# Patient Record
Sex: MALE | Race: WHITE | Employment: OTHER | URBAN - METROPOLITAN AREA
[De-identification: names, ages, dates, MRNs, and addresses within clinical notes are randomized per-mention and may not be internally consistent; named-entity substitution may affect disease eponyms.]

---

## 2017-01-14 DIAGNOSIS — S46.819A TRAPEZIUS STRAIN, UNSPECIFIED LATERALITY, INITIAL ENCOUNTER: ICD-10-CM

## 2017-01-14 DIAGNOSIS — K21.9 GASTROESOPHAGEAL REFLUX DISEASE WITHOUT ESOPHAGITIS: ICD-10-CM

## 2017-01-16 RX ORDER — PANTOPRAZOLE SODIUM 40 MG/1
TABLET, DELAYED RELEASE ORAL
Qty: 60 TABLET | Refills: 3 | Status: SHIPPED | OUTPATIENT
Start: 2017-01-16 | End: 2017-05-01 | Stop reason: SDUPTHER

## 2017-01-16 RX ORDER — MELOXICAM 7.5 MG/1
TABLET ORAL
Qty: 60 TABLET | Refills: 3 | Status: SHIPPED | OUTPATIENT
Start: 2017-01-16 | End: 2017-05-12 | Stop reason: SDUPTHER

## 2017-01-29 DIAGNOSIS — I10 BENIGN ESSENTIAL HTN: ICD-10-CM

## 2017-01-29 DIAGNOSIS — I73.9 PAD (PERIPHERAL ARTERY DISEASE) (HCC): Primary | ICD-10-CM

## 2017-01-30 RX ORDER — HYDROXYZINE PAMOATE 50 MG/1
CAPSULE ORAL
Qty: 60 CAPSULE | Refills: 5 | Status: SHIPPED | OUTPATIENT
Start: 2017-01-30 | End: 2017-03-28

## 2017-01-30 RX ORDER — CILOSTAZOL 100 MG/1
TABLET ORAL
Qty: 60 TABLET | Refills: 5 | Status: SHIPPED | OUTPATIENT
Start: 2017-01-30 | End: 2017-03-28

## 2017-02-28 RX ORDER — METOPROLOL TARTRATE 50 MG/1
TABLET, FILM COATED ORAL
Qty: 60 TABLET | Refills: 5 | Status: SHIPPED | OUTPATIENT
Start: 2017-02-28 | End: 2017-03-28

## 2017-03-22 DIAGNOSIS — E11.42 TYPE 2 DIABETES MELLITUS WITH DIABETIC POLYNEUROPATHY (HCC): ICD-10-CM

## 2017-03-28 DIAGNOSIS — E11.42 TYPE 2 DIABETES MELLITUS WITH DIABETIC POLYNEUROPATHY (HCC): ICD-10-CM

## 2017-03-28 RX ORDER — HYDROXYZINE PAMOATE 50 MG/1
CAPSULE ORAL
Qty: 60 CAPSULE | Refills: 5 | Status: SHIPPED | OUTPATIENT
Start: 2017-03-28 | End: 2018-02-21 | Stop reason: SDUPTHER

## 2017-03-28 RX ORDER — METOPROLOL TARTRATE 50 MG/1
TABLET, FILM COATED ORAL
Qty: 60 TABLET | Refills: 5 | Status: SHIPPED | OUTPATIENT
Start: 2017-03-28 | End: 2018-02-21 | Stop reason: SDUPTHER

## 2017-03-28 RX ORDER — CILOSTAZOL 100 MG/1
TABLET ORAL
Qty: 60 TABLET | Refills: 5 | Status: SHIPPED | OUTPATIENT
Start: 2017-03-28 | End: 2018-02-09 | Stop reason: SDUPTHER

## 2017-05-01 DIAGNOSIS — K21.9 GASTROESOPHAGEAL REFLUX DISEASE WITHOUT ESOPHAGITIS: ICD-10-CM

## 2017-05-01 RX ORDER — ESCITALOPRAM OXALATE 10 MG/1
TABLET ORAL
Qty: 45 TABLET | Refills: 5 | Status: SHIPPED | OUTPATIENT
Start: 2017-05-01 | End: 2017-10-26 | Stop reason: SDUPTHER

## 2017-05-01 RX ORDER — PANTOPRAZOLE SODIUM 40 MG/1
TABLET, DELAYED RELEASE ORAL
Qty: 60 TABLET | Refills: 5 | Status: SHIPPED | OUTPATIENT
Start: 2017-05-01 | End: 2018-02-21 | Stop reason: SDUPTHER

## 2017-05-12 DIAGNOSIS — E78.2 MIXED HYPERLIPIDEMIA: ICD-10-CM

## 2017-05-12 DIAGNOSIS — S46.819A TRAPEZIUS STRAIN, UNSPECIFIED LATERALITY, INITIAL ENCOUNTER: ICD-10-CM

## 2017-05-12 RX ORDER — MELOXICAM 7.5 MG/1
TABLET ORAL
Qty: 60 TABLET | Refills: 1 | Status: SHIPPED | OUTPATIENT
Start: 2017-05-12 | End: 2017-07-06 | Stop reason: SDUPTHER

## 2017-05-12 RX ORDER — ROSUVASTATIN CALCIUM 20 MG/1
TABLET, COATED ORAL
Qty: 30 TABLET | Refills: 5 | Status: SHIPPED | OUTPATIENT
Start: 2017-05-12 | End: 2017-11-24 | Stop reason: SDUPTHER

## 2017-05-12 RX ORDER — HYDROCHLOROTHIAZIDE 25 MG/1
TABLET ORAL
Qty: 30 TABLET | Refills: 5 | Status: SHIPPED | OUTPATIENT
Start: 2017-05-12 | End: 2017-11-24 | Stop reason: SDUPTHER

## 2017-05-12 RX ORDER — SAXAGLIPTIN 5 MG/1
TABLET, FILM COATED ORAL
Qty: 90 TABLET | Refills: 1 | Status: SHIPPED | OUTPATIENT
Start: 2017-05-12 | End: 2017-11-24 | Stop reason: SDUPTHER

## 2017-06-14 LAB
CHOLESTEROL, TOTAL: 146 MG/DL
CHOLESTEROL/HDL RATIO: ABNORMAL
HBA1C MFR BLD: 10.8 %
HDLC SERPL-MCNC: 34 MG/DL (ref 35–70)
LDL CHOLESTEROL CALCULATED: 87 MG/DL (ref 0–160)
TRIGL SERPL-MCNC: 125 MG/DL
VLDLC SERPL CALC-MCNC: 25 MG/DL

## 2017-06-15 ENCOUNTER — OFFICE VISIT (OUTPATIENT)
Dept: FAMILY MEDICINE CLINIC | Age: 47
End: 2017-06-15
Payer: COMMERCIAL

## 2017-06-15 VITALS
HEIGHT: 69 IN | WEIGHT: 191 LBS | BODY MASS INDEX: 28.29 KG/M2 | DIASTOLIC BLOOD PRESSURE: 88 MMHG | SYSTOLIC BLOOD PRESSURE: 138 MMHG | HEART RATE: 85 BPM

## 2017-06-15 DIAGNOSIS — E11.00 UNCONTROLLED TYPE 2 DIABETES MELLITUS WITH HYPEROSMOLARITY WITHOUT COMA, WITH LONG-TERM CURRENT USE OF INSULIN (HCC): Primary | ICD-10-CM

## 2017-06-15 DIAGNOSIS — Z79.4 UNCONTROLLED TYPE 2 DIABETES MELLITUS WITH HYPEROSMOLARITY WITHOUT COMA, WITH LONG-TERM CURRENT USE OF INSULIN (HCC): Primary | ICD-10-CM

## 2017-06-15 DIAGNOSIS — R80.9 MICROALBUMINURIA: ICD-10-CM

## 2017-06-15 DIAGNOSIS — E78.2 MIXED HYPERLIPIDEMIA: ICD-10-CM

## 2017-06-15 DIAGNOSIS — M79.605 LEG PAIN, LEFT: ICD-10-CM

## 2017-06-15 DIAGNOSIS — I73.9 PVD (PERIPHERAL VASCULAR DISEASE) (HCC): ICD-10-CM

## 2017-06-15 DIAGNOSIS — K21.9 GASTROESOPHAGEAL REFLUX DISEASE WITHOUT ESOPHAGITIS: ICD-10-CM

## 2017-06-15 DIAGNOSIS — I10 BENIGN ESSENTIAL HTN: ICD-10-CM

## 2017-06-15 PROCEDURE — 99214 OFFICE O/P EST MOD 30 MIN: CPT | Performed by: INTERNAL MEDICINE

## 2017-06-15 RX ORDER — GABAPENTIN 300 MG/1
300 CAPSULE ORAL 3 TIMES DAILY
Qty: 90 CAPSULE | Refills: 5 | Status: SHIPPED | OUTPATIENT
Start: 2017-06-15 | End: 2018-02-16 | Stop reason: SDUPTHER

## 2017-06-15 ASSESSMENT — ENCOUNTER SYMPTOMS
SORE THROAT: 0
ABDOMINAL PAIN: 0
SHORTNESS OF BREATH: 0
DIARRHEA: 0
RESPIRATORY NEGATIVE: 1
BLOOD IN STOOL: 0
NAUSEA: 0
CONSTIPATION: 0

## 2017-06-19 DIAGNOSIS — Z79.4 UNCONTROLLED TYPE 2 DIABETES MELLITUS WITH HYPEROSMOLARITY WITHOUT COMA, WITH LONG-TERM CURRENT USE OF INSULIN (HCC): ICD-10-CM

## 2017-06-19 DIAGNOSIS — M79.605 LEG PAIN, LEFT: ICD-10-CM

## 2017-06-19 DIAGNOSIS — E11.00 UNCONTROLLED TYPE 2 DIABETES MELLITUS WITH HYPEROSMOLARITY WITHOUT COMA, WITH LONG-TERM CURRENT USE OF INSULIN (HCC): ICD-10-CM

## 2017-06-23 ENCOUNTER — TELEPHONE (OUTPATIENT)
Dept: FAMILY MEDICINE CLINIC | Age: 47
End: 2017-06-23

## 2017-06-23 DIAGNOSIS — Z79.4 UNCONTROLLED TYPE 2 DIABETES MELLITUS WITH HYPEROSMOLARITY WITHOUT COMA, WITH LONG-TERM CURRENT USE OF INSULIN (HCC): ICD-10-CM

## 2017-06-23 DIAGNOSIS — E11.00 UNCONTROLLED TYPE 2 DIABETES MELLITUS WITH HYPEROSMOLARITY WITHOUT COMA, WITH LONG-TERM CURRENT USE OF INSULIN (HCC): ICD-10-CM

## 2017-06-26 ENCOUNTER — TELEPHONE (OUTPATIENT)
Dept: FAMILY MEDICINE CLINIC | Age: 47
End: 2017-06-26

## 2017-06-26 DIAGNOSIS — E11.00 UNCONTROLLED TYPE 2 DIABETES MELLITUS WITH HYPEROSMOLARITY WITHOUT COMA, WITH LONG-TERM CURRENT USE OF INSULIN (HCC): ICD-10-CM

## 2017-06-26 DIAGNOSIS — Z79.4 UNCONTROLLED TYPE 2 DIABETES MELLITUS WITH HYPEROSMOLARITY WITHOUT COMA, WITH LONG-TERM CURRENT USE OF INSULIN (HCC): ICD-10-CM

## 2017-06-30 ENCOUNTER — TELEPHONE (OUTPATIENT)
Dept: FAMILY MEDICINE CLINIC | Age: 47
End: 2017-06-30

## 2017-07-06 DIAGNOSIS — E11.00 UNCONTROLLED TYPE 2 DIABETES MELLITUS WITH HYPEROSMOLARITY WITHOUT COMA, WITH LONG-TERM CURRENT USE OF INSULIN (HCC): ICD-10-CM

## 2017-07-06 DIAGNOSIS — S46.819A TRAPEZIUS STRAIN, UNSPECIFIED LATERALITY, INITIAL ENCOUNTER: ICD-10-CM

## 2017-07-06 DIAGNOSIS — Z79.4 UNCONTROLLED TYPE 2 DIABETES MELLITUS WITH HYPEROSMOLARITY WITHOUT COMA, WITH LONG-TERM CURRENT USE OF INSULIN (HCC): ICD-10-CM

## 2017-07-06 RX ORDER — MELOXICAM 7.5 MG/1
TABLET ORAL
Qty: 60 TABLET | Refills: 3 | Status: SHIPPED | OUTPATIENT
Start: 2017-07-06 | End: 2018-02-21 | Stop reason: SDUPTHER

## 2017-07-14 ENCOUNTER — OFFICE VISIT (OUTPATIENT)
Dept: FAMILY MEDICINE CLINIC | Age: 47
End: 2017-07-14
Payer: COMMERCIAL

## 2017-07-14 VITALS
DIASTOLIC BLOOD PRESSURE: 86 MMHG | BODY MASS INDEX: 29.03 KG/M2 | WEIGHT: 196 LBS | SYSTOLIC BLOOD PRESSURE: 134 MMHG | HEART RATE: 80 BPM | HEIGHT: 69 IN

## 2017-07-14 DIAGNOSIS — Z79.4 UNCONTROLLED TYPE 2 DIABETES MELLITUS WITH HYPEROSMOLARITY WITHOUT COMA, WITH LONG-TERM CURRENT USE OF INSULIN (HCC): ICD-10-CM

## 2017-07-14 DIAGNOSIS — E11.00 UNCONTROLLED TYPE 2 DIABETES MELLITUS WITH HYPEROSMOLARITY WITHOUT COMA, WITH LONG-TERM CURRENT USE OF INSULIN (HCC): ICD-10-CM

## 2017-07-14 PROCEDURE — 99213 OFFICE O/P EST LOW 20 MIN: CPT | Performed by: INTERNAL MEDICINE

## 2017-07-14 ASSESSMENT — ENCOUNTER SYMPTOMS
RESPIRATORY NEGATIVE: 1
BLOOD IN STOOL: 0
DIARRHEA: 0
SORE THROAT: 0
NAUSEA: 0
ABDOMINAL PAIN: 0
CONSTIPATION: 0

## 2017-07-31 ENCOUNTER — TELEPHONE (OUTPATIENT)
Dept: FAMILY MEDICINE CLINIC | Age: 47
End: 2017-07-31

## 2017-07-31 DIAGNOSIS — E11.00 UNCONTROLLED TYPE 2 DIABETES MELLITUS WITH HYPEROSMOLARITY WITHOUT COMA, WITH LONG-TERM CURRENT USE OF INSULIN (HCC): ICD-10-CM

## 2017-07-31 DIAGNOSIS — Z79.4 UNCONTROLLED TYPE 2 DIABETES MELLITUS WITH HYPEROSMOLARITY WITHOUT COMA, WITH LONG-TERM CURRENT USE OF INSULIN (HCC): ICD-10-CM

## 2017-08-04 DIAGNOSIS — Z79.4 UNCONTROLLED TYPE 2 DIABETES MELLITUS WITH HYPEROSMOLARITY WITHOUT COMA, WITH LONG-TERM CURRENT USE OF INSULIN (HCC): ICD-10-CM

## 2017-08-04 DIAGNOSIS — E11.00 UNCONTROLLED TYPE 2 DIABETES MELLITUS WITH HYPEROSMOLARITY WITHOUT COMA, WITH LONG-TERM CURRENT USE OF INSULIN (HCC): ICD-10-CM

## 2017-08-04 RX ORDER — INSULIN DETEMIR 100 [IU]/ML
INJECTION, SOLUTION SUBCUTANEOUS
Qty: 15 ML | Refills: 3 | Status: SHIPPED | OUTPATIENT
Start: 2017-08-04 | End: 2017-08-23 | Stop reason: SDUPTHER

## 2017-08-17 ENCOUNTER — TELEPHONE (OUTPATIENT)
Dept: FAMILY MEDICINE CLINIC | Age: 47
End: 2017-08-17

## 2017-08-23 DIAGNOSIS — Z79.4 UNCONTROLLED TYPE 2 DIABETES MELLITUS WITH HYPEROSMOLARITY WITHOUT COMA, WITH LONG-TERM CURRENT USE OF INSULIN (HCC): ICD-10-CM

## 2017-08-23 DIAGNOSIS — E11.00 UNCONTROLLED TYPE 2 DIABETES MELLITUS WITH HYPEROSMOLARITY WITHOUT COMA, WITH LONG-TERM CURRENT USE OF INSULIN (HCC): ICD-10-CM

## 2017-08-23 RX ORDER — CALCIUM CITRATE/VITAMIN D3 200MG-6.25
TABLET ORAL
Refills: 0 | COMMUNITY
Start: 2017-07-31 | End: 2017-10-03 | Stop reason: SDUPTHER

## 2017-08-23 RX ORDER — LANCETS 28 GAUGE
EACH MISCELLANEOUS
Refills: 0 | COMMUNITY
Start: 2017-07-31 | End: 2019-10-10 | Stop reason: SDUPTHER

## 2017-08-28 ENCOUNTER — TELEPHONE (OUTPATIENT)
Dept: FAMILY MEDICINE CLINIC | Age: 47
End: 2017-08-28

## 2017-08-28 DIAGNOSIS — Z79.4 UNCONTROLLED TYPE 2 DIABETES MELLITUS WITH HYPEROSMOLARITY WITHOUT COMA, WITH LONG-TERM CURRENT USE OF INSULIN (HCC): ICD-10-CM

## 2017-08-28 DIAGNOSIS — E11.00 UNCONTROLLED TYPE 2 DIABETES MELLITUS WITH HYPEROSMOLARITY WITHOUT COMA, WITH LONG-TERM CURRENT USE OF INSULIN (HCC): ICD-10-CM

## 2017-09-06 ENCOUNTER — TELEPHONE (OUTPATIENT)
Dept: FAMILY MEDICINE CLINIC | Age: 47
End: 2017-09-06

## 2017-09-06 DIAGNOSIS — Z79.4 UNCONTROLLED TYPE 2 DIABETES MELLITUS WITH HYPEROSMOLARITY WITHOUT COMA, WITH LONG-TERM CURRENT USE OF INSULIN (HCC): ICD-10-CM

## 2017-09-06 DIAGNOSIS — E11.00 UNCONTROLLED TYPE 2 DIABETES MELLITUS WITH HYPEROSMOLARITY WITHOUT COMA, WITH LONG-TERM CURRENT USE OF INSULIN (HCC): ICD-10-CM

## 2017-09-13 DIAGNOSIS — Z79.4 UNCONTROLLED TYPE 2 DIABETES MELLITUS WITH HYPEROSMOLARITY WITHOUT COMA, WITH LONG-TERM CURRENT USE OF INSULIN (HCC): ICD-10-CM

## 2017-09-13 DIAGNOSIS — E11.00 UNCONTROLLED TYPE 2 DIABETES MELLITUS WITH HYPEROSMOLARITY WITHOUT COMA, WITH LONG-TERM CURRENT USE OF INSULIN (HCC): ICD-10-CM

## 2017-10-03 DIAGNOSIS — Z79.4 UNCONTROLLED TYPE 2 DIABETES MELLITUS WITH HYPEROSMOLARITY WITHOUT COMA, WITH LONG-TERM CURRENT USE OF INSULIN (HCC): ICD-10-CM

## 2017-10-03 DIAGNOSIS — E11.00 UNCONTROLLED TYPE 2 DIABETES MELLITUS WITH HYPEROSMOLARITY WITHOUT COMA, WITH LONG-TERM CURRENT USE OF INSULIN (HCC): ICD-10-CM

## 2017-10-03 RX ORDER — CALCIUM CITRATE/VITAMIN D3 200MG-6.25
TABLET ORAL
Qty: 50 EACH | Refills: 11 | Status: SHIPPED | OUTPATIENT
Start: 2017-10-03 | End: 2018-06-19 | Stop reason: SDUPTHER

## 2017-10-20 ENCOUNTER — OFFICE VISIT (OUTPATIENT)
Dept: FAMILY MEDICINE CLINIC | Age: 47
End: 2017-10-20
Payer: COMMERCIAL

## 2017-10-20 VITALS
DIASTOLIC BLOOD PRESSURE: 90 MMHG | WEIGHT: 200 LBS | HEART RATE: 82 BPM | SYSTOLIC BLOOD PRESSURE: 150 MMHG | HEIGHT: 69 IN | BODY MASS INDEX: 29.62 KG/M2

## 2017-10-20 DIAGNOSIS — Z79.4 UNCONTROLLED TYPE 2 DIABETES MELLITUS WITH HYPEROSMOLARITY WITHOUT COMA, WITH LONG-TERM CURRENT USE OF INSULIN (HCC): ICD-10-CM

## 2017-10-20 DIAGNOSIS — K21.9 GASTROESOPHAGEAL REFLUX DISEASE WITHOUT ESOPHAGITIS: ICD-10-CM

## 2017-10-20 DIAGNOSIS — E78.2 MIXED HYPERLIPIDEMIA: ICD-10-CM

## 2017-10-20 DIAGNOSIS — R80.9 MICROALBUMINURIA: ICD-10-CM

## 2017-10-20 DIAGNOSIS — E55.9 VITAMIN D DEFICIENCY: ICD-10-CM

## 2017-10-20 DIAGNOSIS — E11.00 UNCONTROLLED TYPE 2 DIABETES MELLITUS WITH HYPEROSMOLARITY WITHOUT COMA, WITH LONG-TERM CURRENT USE OF INSULIN (HCC): ICD-10-CM

## 2017-10-20 DIAGNOSIS — I10 BENIGN ESSENTIAL HTN: Primary | ICD-10-CM

## 2017-10-20 DIAGNOSIS — Z23 NEED FOR INFLUENZA VACCINATION: ICD-10-CM

## 2017-10-20 DIAGNOSIS — I73.9 PVD (PERIPHERAL VASCULAR DISEASE) (HCC): ICD-10-CM

## 2017-10-20 DIAGNOSIS — E11.42 DIABETIC PERIPHERAL NEUROPATHY (HCC): ICD-10-CM

## 2017-10-20 LAB — HBA1C MFR BLD: 8.4 %

## 2017-10-20 PROCEDURE — G8417 CALC BMI ABV UP PARAM F/U: HCPCS | Performed by: INTERNAL MEDICINE

## 2017-10-20 PROCEDURE — G8427 DOCREV CUR MEDS BY ELIG CLIN: HCPCS | Performed by: INTERNAL MEDICINE

## 2017-10-20 PROCEDURE — 90471 IMMUNIZATION ADMIN: CPT | Performed by: INTERNAL MEDICINE

## 2017-10-20 PROCEDURE — 1036F TOBACCO NON-USER: CPT | Performed by: INTERNAL MEDICINE

## 2017-10-20 PROCEDURE — 90686 IIV4 VACC NO PRSV 0.5 ML IM: CPT | Performed by: INTERNAL MEDICINE

## 2017-10-20 PROCEDURE — 99214 OFFICE O/P EST MOD 30 MIN: CPT | Performed by: INTERNAL MEDICINE

## 2017-10-20 PROCEDURE — G8484 FLU IMMUNIZE NO ADMIN: HCPCS | Performed by: INTERNAL MEDICINE

## 2017-10-20 PROCEDURE — 3046F HEMOGLOBIN A1C LEVEL >9.0%: CPT | Performed by: INTERNAL MEDICINE

## 2017-10-20 PROCEDURE — 83036 HEMOGLOBIN GLYCOSYLATED A1C: CPT | Performed by: INTERNAL MEDICINE

## 2017-10-20 RX ORDER — AMLODIPINE BESYLATE 5 MG/1
5 TABLET ORAL DAILY
Qty: 30 TABLET | Refills: 5 | Status: SHIPPED | OUTPATIENT
Start: 2017-10-20 | End: 2019-09-25 | Stop reason: SDUPTHER

## 2017-10-20 ASSESSMENT — ENCOUNTER SYMPTOMS
ABDOMINAL PAIN: 0
RESPIRATORY NEGATIVE: 1
CONSTIPATION: 0
BLOOD IN STOOL: 0
NAUSEA: 0
DIARRHEA: 0
SORE THROAT: 0

## 2017-10-20 NOTE — PATIENT INSTRUCTIONS
1. Need for influenza vaccination  Preston was given an influenza vaccine today. - INFLUENZA, QUADV, 3 YRS AND OLDER, IM, PF, PREFILL SYR OR SDV, 0.5ML (FLUZONE QUADV, PF)    2. Benign essential HTN  Add Amlodipine 5 mg daily. Since Chrystal Donovan had an elevated blood pressure in the clinic today, he is instructed to follow up in the clinic in 2 weeks for a repeat blood pressure check. Preston was instructed to take his blood pressure medication at least 2 hours before the appointment. Instructions were also given to avoid caffeine before the blood pressure evaluation and to follow a sodium restricted diet. Attempt to obtain blood pressure measurements outside of the clinic and bring these readings to your office appointment. 3. Diabetic peripheral neuropathy (HCC)  Continue on Neurontin. 4. Gastroesophageal reflux disease without esophagitis  Continue Protonix. 5. Mixed hyperlipidemia  Preston was instructed to continue his current medication regimen. 6. Vitamin D deficiency  Continue on Vitamin D replacement. 7. Microalbuminuria  Preston is on an ARB. 8. PVD (peripheral vascular disease) ([facility] [address]  Follow up with Vascular surgery. - External Referral To Vascular Surgery    9. Uncontrolled type 2 diabetes mellitus with hyperosmolarity without coma, with long-term current use of insulin ([facility])  HgbA1C today. - POCT glycosylated hemoglobin (Hb A1C)    Laith Zuniga was instructed to follow up in the clinic in 3 months for check up or as needed with any medical issues. Patient Education        DASH Diet: Care Instructions  Your Care Instructions  The DASH diet is an eating plan that can help lower your blood pressure. DASH stands for Dietary Approaches to Stop Hypertension. Hypertension is high blood pressure. The DASH diet focuses on eating foods that are high in calcium, potassium, and magnesium. These nutrients can lower blood pressure.  The foods that are highest in these nutrients are fruits, vegetables, low-fat dairy products, nuts, seeds, and legumes. But taking calcium, potassium, and magnesium supplements instead of eating foods that are high in those nutrients does not have the same effect. The DASH diet also includes whole grains, fish, and poultry. The DASH diet is one of several lifestyle changes your doctor may recommend to lower your high blood pressure. Your doctor may also want you to decrease the amount of sodium in your diet. Lowering sodium while following the DASH diet can lower blood pressure even further than just the DASH diet alone. Follow-up care is a key part of your treatment and safety. Be sure to make and go to all appointments, and call your doctor if you are having problems. It's also a good idea to know your test results and keep a list of the medicines you take. How can you care for yourself at home? Following the DASH diet  · Eat 4 to 5 servings of fruit each day. A serving is 1 medium-sized piece of fruit, ½ cup chopped or canned fruit, 1/4 cup dried fruit, or 4 ounces (½ cup) of fruit juice. Choose fruit more often than fruit juice. · Eat 4 to 5 servings of vegetables each day. A serving is 1 cup of lettuce or raw leafy vegetables, ½ cup of chopped or cooked vegetables, or 4 ounces (½ cup) of vegetable juice. Choose vegetables more often than vegetable juice. · Get 2 to 3 servings of low-fat and fat-free dairy each day. A serving is 8 ounces of milk, 1 cup of yogurt, or 1 ½ ounces of cheese. · Eat 6 to 8 servings of grains each day. A serving is 1 slice of bread, 1 ounce of dry cereal, or ½ cup of cooked rice, pasta, or cooked cereal. Try to choose whole-grain products as much as possible. · Limit lean meat, poultry, and fish to 2 servings each day. A serving is 3 ounces, about the size of a deck of cards. · Eat 4 to 5 servings of nuts, seeds, and legumes (cooked dried beans, lentils, and split peas) each week.  A serving is 1/3 cup of nuts, 2 tablespoons of seeds, 2017  Content Version: 11.3  © 5467-4844 [employer]. Care instructions adapted under license by [facility] ([facility]). If you have questions about a medical condition or this instruction, always ask your healthcare professional. [address] any warranty or liability for your use of this information.

## 2017-10-20 NOTE — LETTER
[address] [address]  Phone: [phone]  Fax: [phone]    Glen Azevedo MD        October 20, 2017     Patient: Zainab Rivas   YOB: 1970   Date of Visit: 10/20/2017       To Whom It May Concern: It is my medical opinion that Sal Davila requires a disability parking placard for the following reasons:  He has limited walking ability due to vascular condition. Duration of need: permanent    If you have any questions or concerns, please don't hesitate to call.     Sincerely,        Glen Azevedo MD

## 2017-10-20 NOTE — PROGRESS NOTES
After obtaining consent, and per orders of Dr. Ivana Strickland, injection of influenza given in Left deltoid by Simona Higuera. Patient instructed to remain in clinic for 20 minutes afterwards, and to report any adverse reaction to me immediately.

## 2017-10-20 NOTE — PROGRESS NOTES
The problem is controlled. Recent lipid tests were reviewed and are normal. Pertinent negatives include no chest pain or myalgias. Current antihyperlipidemic treatment includes statins. The current treatment provides significant improvement of lipids. There are no compliance problems. Mental Health Problem     Additional symptoms of the illness do not include abdominal pain. Review of Systems   Constitutional: Negative. HENT: Negative for congestion, ear pain, postnasal drip, sneezing and sore throat. Eyes: Negative for visual disturbance. Respiratory: Negative. Cardiovascular: Negative for chest pain, palpitations and leg swelling. Gastrointestinal: Negative for abdominal pain, blood in stool, constipation, diarrhea and nausea. Genitourinary: Negative for difficulty urinating, dysuria, frequency and urgency. Musculoskeletal: Positive for arthralgias. Negative for joint swelling, myalgias, neck pain and neck stiffness. Skin: Negative. Neurological: Negative for syncope. Psychiatric/Behavioral: Negative. Objective:   Physical Exam   Constitutional: He is oriented to person, place, and time. He appears well-developed and well-nourished. No distress. HENT:   Head: Normocephalic. Eyes: Conjunctivae are normal.   Neck: Normal range of motion. Neck supple. Cardiovascular: Normal rate, regular rhythm and normal heart sounds. Pulmonary/Chest: Effort normal and breath sounds normal.   Abdominal: Soft. There is no tenderness. Musculoskeletal: He exhibits no edema. Lymphadenopathy:     He has no cervical adenopathy. Neurological: He is alert and oriented to person, place, and time. Skin: No rash noted. Psychiatric: He has a normal mood and affect. His behavior is normal. Thought content normal.       Assessment:      1. Benign essential HTN  amLODIPine (NORVASC) 5 MG tablet   2.  Need for influenza vaccination  INFLUENZA, QUADV, 3 YRS AND OLDER, IM, PF, PREFILL SYR OR SDV, 0.5ML (FLUZONE QUADV, PF)   3. Diabetic peripheral neuropathy ([facility] [address]     4. Gastroesophageal reflux disease without esophagitis     5. Mixed hyperlipidemia     6. Vitamin D deficiency     7. Microalbuminuria     8. PVD (peripheral vascular disease) [facility])  External Referral To Vascular Surgery   9. Uncontrolled type 2 diabetes mellitus with hyperosmolarity without coma, with long-term current use of insulin (HCC)  POCT glycosylated hemoglobin (Hb A1C)           Plan:      1. Need for influenza vaccination  Preston was given an influenza vaccine today. - INFLUENZA, QUADV, 3 YRS AND OLDER, IM, PF, PREFILL SYR OR SDV, 0.5ML (FLUZONE QUADV, PF)    2. Benign essential HTN  Add Amlodipine 5 mg daily. Since Lazaro Mann had an elevated blood pressure in the clinic today, he is instructed to follow up in the clinic in 2 weeks for a repeat blood pressure check. Prseton was instructed to take his blood pressure medication at least 2 hours before the appointment. Instructions were also given to avoid caffeine before the blood pressure evaluation and to follow a sodium restricted diet. Attempt to obtain blood pressure measurements outside of the clinic and bring these readings to your office appointment. 3. Diabetic peripheral neuropathy (HCC)  Continue on Neurontin. 4. Gastroesophageal reflux disease without esophagitis  Continue Protonix. 5. Mixed hyperlipidemia  Preston was instructed to continue his current medication regimen. 6. Vitamin D deficiency  Continue on Vitamin D replacement. 7. Microalbuminuria  Preston is on an ARB. 8. PVD (peripheral vascular disease) ([facility] [address]  Follow up with Vascular surgery. - External Referral To Vascular Surgery    9. Uncontrolled type 2 diabetes mellitus with hyperosmolarity without coma, with long-term current use of insulin (HCC)  HgbA1C today.     - POCT glycosylated hemoglobin (Hb A1C)    Sumi Jiménez was instructed to follow up in the clinic in 3 months for check up

## 2017-10-27 ENCOUNTER — TELEPHONE (OUTPATIENT)
Dept: FAMILY MEDICINE CLINIC | Age: 47
End: 2017-10-27

## 2017-10-27 DIAGNOSIS — E11.00 UNCONTROLLED TYPE 2 DIABETES MELLITUS WITH HYPEROSMOLARITY WITHOUT COMA, WITH LONG-TERM CURRENT USE OF INSULIN (HCC): ICD-10-CM

## 2017-10-27 DIAGNOSIS — Z79.4 UNCONTROLLED TYPE 2 DIABETES MELLITUS WITH HYPEROSMOLARITY WITHOUT COMA, WITH LONG-TERM CURRENT USE OF INSULIN (HCC): ICD-10-CM

## 2017-10-27 RX ORDER — ESCITALOPRAM OXALATE 10 MG/1
TABLET ORAL
Qty: 45 TABLET | Refills: 5 | Status: SHIPPED | OUTPATIENT
Start: 2017-10-27 | End: 2018-02-21 | Stop reason: SDUPTHER

## 2017-10-27 NOTE — TELEPHONE ENCOUNTER
Health Maintenance   Topic Date Due    HIV screen  04/15/1986    Diabetic foot exam  10/08/2016    Diabetic retinal exam  10/08/2016    Diabetic microalbuminuria test  03/30/2017    Lipid screen  06/14/2018    Diabetic hemoglobin A1C test  10/20/2018    DTaP/Tdap/Td vaccine (2 - Td) 11/19/2022    Flu vaccine  Completed    Pneumococcal med risk  Completed             (applicable per patient's age: Cancer Screenings, Depression Screening, Fall Risk Screening, Immunizations)    Hemoglobin A1C (%)   Date Value   10/20/2017 8.4   06/14/2017 10.8   10/19/2016 8.7     Microalb/Crt.  Ratio (mcg/mg creat)   Date Value   10/09/2015 24     LDL Cholesterol (mg/dL)   Date Value   10/09/2015 119     LDL Calculated (mg/dL)   Date Value   06/14/2017 87     AST (U/L)   Date Value   10/09/2015 32     ALT (U/L)   Date Value   10/09/2015 66 (H)     BUN (mg/dL)   Date Value   10/09/2015 14      (goal A1C is < 7)   (goal LDL is <100) need 30-50% reduction from baseline     BP Readings from Last 3 Encounters:   10/20/17 (!) 150/90   07/14/17 134/86   06/15/17 138/88    (goal /80)      All Future Testing planned in [facility]:  Lab Frequency Next Occurrence   Basic Metabolic Panel Once 70/32/6993   Hemoglobin A1C Once 11/14/2017       Next Visit Date:  Future Appointments  Date Time Provider Dwaine Pal   11/3/2017 10:00 AM SCHEDULE, IMANI [facility]   1/22/2018 9:45 AM MD Jad Hamlin [facility]            Patient Active Problem List:     GERD (gastroesophageal reflux disease)     Carpal tunnel syndrome of right wrist     Benign essential HTN     Diabetic peripheral neuropathy (HCC)     Mixed hyperlipidemia     Vitamin D deficiency     Leg pain, left     Microalbuminuria     PVD (peripheral vascular disease) ([facility] [address]     Need for influenza vaccination

## 2017-10-27 NOTE — TELEPHONE ENCOUNTER
Patient informed to increase levemir to 87 units daily. Advised to call next week with FBS readings.

## 2017-10-27 NOTE — TELEPHONE ENCOUNTER
Patient calling with FBS's past week 178, 167, 154, 154, 151. Taking 82 units Levemir Q morning, added on amlodipine past week. Patient states \"feeling better\". Advised to continue medication doses as is and call office next week with update. Health Maintenance   Topic Date Due    HIV screen  04/15/1986    Diabetic foot exam  10/08/2016    Diabetic retinal exam  10/08/2016    Diabetic microalbuminuria test  03/30/2017    Lipid screen  06/14/2018    Diabetic hemoglobin A1C test  10/20/2018    DTaP/Tdap/Td vaccine (2 - Td) 11/19/2022    Flu vaccine  Completed    Pneumococcal med risk  Completed             (applicable per patient's age: Cancer Screenings, Depression Screening, Fall Risk Screening, Immunizations)    Hemoglobin A1C (%)   Date Value   10/20/2017 8.4   06/14/2017 10.8   10/19/2016 8.7     Microalb/Crt.  Ratio (mcg/mg creat)   Date Value   10/09/2015 24     LDL Cholesterol (mg/dL)   Date Value   10/09/2015 119     LDL Calculated (mg/dL)   Date Value   06/14/2017 87     AST (U/L)   Date Value   10/09/2015 32     ALT (U/L)   Date Value   10/09/2015 66 (H)     BUN (mg/dL)   Date Value   10/09/2015 14      (goal A1C is < 7)   (goal LDL is <100) need 30-50% reduction from baseline     BP Readings from Last 3 Encounters:   10/20/17 (!) 150/90   07/14/17 134/86   06/15/17 138/88    (goal /80)      All Future Testing planned in [facility]:  Lab Frequency Next Occurrence   Basic Metabolic Panel Once 51/91/2828   Hemoglobin A1C Once 11/14/2017       Next Visit Date:  Future Appointments  Date Time Provider Dwaine Pal   11/3/2017 10:00 AM SCHEDULE, IMANI [facility] [place]   1/22/2018 9:45 [facility]            Patient Active Problem List:     GERD (gastroesophageal reflux disease)     Carpal tunnel syndrome of right wrist     Benign essential HTN     Diabetic peripheral neuropathy (HCC)     Mixed hyperlipidemia     Vitamin D deficiency     Leg

## 2017-11-03 ENCOUNTER — NURSE ONLY (OUTPATIENT)
Dept: FAMILY MEDICINE CLINIC | Age: 47
End: 2017-11-03

## 2017-11-03 VITALS — DIASTOLIC BLOOD PRESSURE: 86 MMHG | SYSTOLIC BLOOD PRESSURE: 145 MMHG

## 2017-11-03 DIAGNOSIS — I10 BENIGN ESSENTIAL HTN: Primary | ICD-10-CM

## 2017-11-03 NOTE — PROGRESS NOTES
Pt presents today for a blood pressure check. Denies HA, SOB, CP, or dizziness. Just started the Norvasc on Monday.

## 2017-11-14 ENCOUNTER — OFFICE VISIT (OUTPATIENT)
Dept: FAMILY MEDICINE CLINIC | Age: 47
End: 2017-11-14
Payer: COMMERCIAL

## 2017-11-14 VITALS
HEIGHT: 69 IN | WEIGHT: 199 LBS | BODY MASS INDEX: 29.47 KG/M2 | DIASTOLIC BLOOD PRESSURE: 84 MMHG | HEART RATE: 96 BPM | SYSTOLIC BLOOD PRESSURE: 148 MMHG

## 2017-11-14 DIAGNOSIS — H69.83 EUSTACHIAN TUBE DYSFUNCTION, BILATERAL: Primary | ICD-10-CM

## 2017-11-14 PROCEDURE — G8484 FLU IMMUNIZE NO ADMIN: HCPCS | Performed by: INTERNAL MEDICINE

## 2017-11-14 PROCEDURE — G8417 CALC BMI ABV UP PARAM F/U: HCPCS | Performed by: INTERNAL MEDICINE

## 2017-11-14 PROCEDURE — 99213 OFFICE O/P EST LOW 20 MIN: CPT | Performed by: INTERNAL MEDICINE

## 2017-11-14 PROCEDURE — 1036F TOBACCO NON-USER: CPT | Performed by: INTERNAL MEDICINE

## 2017-11-14 PROCEDURE — G8427 DOCREV CUR MEDS BY ELIG CLIN: HCPCS | Performed by: INTERNAL MEDICINE

## 2017-11-14 RX ORDER — FLUTICASONE PROPIONATE 50 MCG
2 SPRAY, SUSPENSION (ML) NASAL DAILY
Qty: 1 BOTTLE | Refills: 3
Start: 2017-11-14 | End: 2018-07-24

## 2017-11-14 ASSESSMENT — ENCOUNTER SYMPTOMS
DIARRHEA: 0
RESPIRATORY NEGATIVE: 1
ABDOMINAL PAIN: 0
SORE THROAT: 0
BLOOD IN STOOL: 0
NAUSEA: 0
CONSTIPATION: 0

## 2017-11-14 ASSESSMENT — PATIENT HEALTH QUESTIONNAIRE - PHQ9
1. LITTLE INTEREST OR PLEASURE IN DOING THINGS: 0
SUM OF ALL RESPONSES TO PHQ9 QUESTIONS 1 & 2: 0
SUM OF ALL RESPONSES TO PHQ QUESTIONS 1-9: 0
2. FEELING DOWN, DEPRESSED OR HOPELESS: 0

## 2017-11-14 NOTE — PROGRESS NOTES
50 MCG/ACT nasal spray           Plan:      1. Eustachian tube dysfunction, bilateral  Use Flonase 2 puffs in each side of the nose daily. Efrain Linn is instructed to return to the clinic if the symptoms continue or worsen. Justynkaren Linn  was also instructed to go to the emergency room department if the symptoms significantly worsen before an appointment can be made. - fluticasone (FLONASE) 50 MCG/ACT nasal spray; 2 sprays by Nasal route daily  Dispense: 1 Bottle;  Refill: 3

## 2017-11-14 NOTE — PATIENT INSTRUCTIONS
SURVEY:    You may be receiving a survey from [employer] regarding your visit today. Please complete the survey to enable us to provide the highest quality of care to you and your family. If you cannot score us a very good on any question, please call the office to discuss how we could of made your experience a very good one. Thank you. 1. Eustachian tube dysfunction, bilateral  Use Flonase 2 puffs in each side of the nose daily. Linda Santos is instructed to return to the clinic if the symptoms continue or worsen. Linda Santos  was also instructed to go to the emergency room department if the symptoms significantly worsen before an appointment can be made. - fluticasone (FLONASE) 50 MCG/ACT nasal spray; 2 sprays by Nasal route daily  Dispense: 1 Bottle; Refill: 3  Patient Education        Eustachian Tube Problems: Care Instructions  Your Care Instructions    The eustachian (say \"you-STAY-shee-un\") tubes run between the inside of the ears and the throat. They keep air pressure stable in the ears. If your eustachian tubes become blocked, the air pressure in your ears changes. The fluids from a cold can clog eustachian tubes, causing pain in the ears. A quick change in air pressure can cause eustachian tubes to close up. This might happen when an airplane changes altitude or when a  goes up or down underwater. Eustachian tube problems often clear up on their own or after antibiotic treatment. If your tubes continue to be blocked, you may need surgery. Follow-up care is a key part of your treatment and safety. Be sure to make and go to all appointments, and call your doctor if you are having problems. It's also a good idea to know your test results and keep a list of the medicines you take. How can you care for yourself at home? · To ease ear pain, apply a warm washcloth or a heating pad set on low. There may be some drainage from the ear when the heat melts earwax.  Put a cloth between the heat

## 2017-11-24 DIAGNOSIS — E78.2 MIXED HYPERLIPIDEMIA: ICD-10-CM

## 2017-11-27 RX ORDER — HYDROCHLOROTHIAZIDE 25 MG/1
TABLET ORAL
Qty: 30 TABLET | Refills: 5 | Status: SHIPPED | OUTPATIENT
Start: 2017-11-27 | End: 2018-02-16 | Stop reason: SDUPTHER

## 2017-11-27 RX ORDER — ROSUVASTATIN CALCIUM 20 MG/1
TABLET, COATED ORAL
Qty: 30 TABLET | Refills: 5 | Status: SHIPPED | OUTPATIENT
Start: 2017-11-27 | End: 2018-02-16 | Stop reason: SDUPTHER

## 2017-11-27 RX ORDER — SAXAGLIPTIN 5 MG/1
TABLET, FILM COATED ORAL
Qty: 90 TABLET | Refills: 1 | Status: SHIPPED | OUTPATIENT
Start: 2017-11-27 | End: 2018-02-16 | Stop reason: SDUPTHER

## 2018-01-08 DIAGNOSIS — E11.42 TYPE 2 DIABETES MELLITUS WITH DIABETIC POLYNEUROPATHY (HCC): ICD-10-CM

## 2018-02-09 RX ORDER — CILOSTAZOL 100 MG/1
TABLET ORAL
Qty: 60 TABLET | Refills: 5 | Status: SHIPPED | OUTPATIENT
Start: 2018-02-09 | End: 2018-02-16 | Stop reason: SDUPTHER

## 2018-02-16 DIAGNOSIS — I10 BENIGN ESSENTIAL HTN: ICD-10-CM

## 2018-02-16 DIAGNOSIS — E78.2 MIXED HYPERLIPIDEMIA: ICD-10-CM

## 2018-02-16 DIAGNOSIS — M79.605 LEG PAIN, LEFT: ICD-10-CM

## 2018-02-16 RX ORDER — CILOSTAZOL 100 MG/1
TABLET ORAL
Qty: 180 TABLET | Refills: 1 | Status: SHIPPED | OUTPATIENT
Start: 2018-02-16 | End: 2019-09-25 | Stop reason: SDUPTHER

## 2018-02-16 RX ORDER — LOSARTAN POTASSIUM 100 MG/1
TABLET ORAL
Qty: 90 TABLET | Refills: 1 | Status: SHIPPED | OUTPATIENT
Start: 2018-02-16 | End: 2018-07-14 | Stop reason: SDUPTHER

## 2018-02-16 RX ORDER — HYDROCHLOROTHIAZIDE 25 MG/1
TABLET ORAL
Qty: 90 TABLET | Refills: 1 | Status: SHIPPED | OUTPATIENT
Start: 2018-02-16 | End: 2018-08-14 | Stop reason: SDUPTHER

## 2018-02-16 RX ORDER — GABAPENTIN 300 MG/1
300 CAPSULE ORAL 3 TIMES DAILY
Qty: 90 CAPSULE | Refills: 1 | Status: SHIPPED | OUTPATIENT
Start: 2018-02-16 | End: 2018-07-24

## 2018-02-16 RX ORDER — ROSUVASTATIN CALCIUM 20 MG/1
TABLET, COATED ORAL
Qty: 90 TABLET | Refills: 1 | Status: SHIPPED | OUTPATIENT
Start: 2018-02-16 | End: 2018-07-30 | Stop reason: SDUPTHER

## 2018-02-16 NOTE — TELEPHONE ENCOUNTER
Health Maintenance   Topic Date Due    HIV screen  04/15/1986    Diabetic foot exam  10/08/2016    Diabetic retinal exam  10/08/2016    Diabetic microalbuminuria test  03/30/2017    Potassium monitoring  10/19/2017    Creatinine monitoring  10/19/2017    Lipid screen  06/14/2018    A1C test (Diabetic or Prediabetic)  10/20/2018    DTaP/Tdap/Td vaccine (2 - Td) 11/19/2022    Flu vaccine  Completed    Pneumococcal med risk  Completed             (applicable per patient's age: Cancer Screenings, Depression Screening, Fall Risk Screening, Immunizations)    Hemoglobin A1C (%)   Date Value   10/20/2017 8.4   06/14/2017 10.8   10/19/2016 8.7     Microalb/Crt. Ratio (mcg/mg creat)   Date Value   10/09/2015 24     LDL Cholesterol (mg/dL)   Date Value   10/09/2015 119     LDL Calculated (mg/dL)   Date Value   06/14/2017 87     AST (U/L)   Date Value   10/09/2015 32     ALT (U/L)   Date Value   10/09/2015 66 (H)     BUN (mg/dL)   Date Value   10/09/2015 14      (goal A1C is < 7)   (goal LDL is <100) need 30-50% reduction from baseline     BP Readings from Last 3 Encounters:   11/14/17 (!) 148/84   11/03/17 (!) 145/86   10/20/17 (!) 150/90    (goal /80)      All Future Testing planned in [facility]:  Lab Frequency Next Occurrence   Basic Metabolic Panel Once 01/59/4908   Hemoglobin A1C Once 01/30/2018       Next Visit Date:  No future appointments.          Patient Active Problem List:     GERD (gastroesophageal reflux disease)     Carpal tunnel syndrome of right wrist     Benign essential HTN     Diabetic peripheral neuropathy (HCC)     Mixed hyperlipidemia     Vitamin D deficiency     Leg pain, left     Microalbuminuria     PVD (peripheral vascular disease) ([facility] [address]     Need for influenza vaccination

## 2018-02-16 NOTE — TELEPHONE ENCOUNTER
Health Maintenance   Topic Date Due    HIV screen  04/15/1986    Diabetic foot exam  10/08/2016    Diabetic retinal exam  10/08/2016    Diabetic microalbuminuria test  03/30/2017    Potassium monitoring  10/19/2017    Creatinine monitoring  10/19/2017    Lipid screen  06/14/2018    A1C test (Diabetic or Prediabetic)  10/20/2018    DTaP/Tdap/Td vaccine (2 - Td) 11/19/2022    Flu vaccine  Completed    Pneumococcal med risk  Completed             (applicable per patient's age: Cancer Screenings, Depression Screening, Fall Risk Screening, Immunizations)    Hemoglobin A1C (%)   Date Value   10/20/2017 8.4   06/14/2017 10.8   10/19/2016 8.7     Microalb/Crt. Ratio (mcg/mg creat)   Date Value   10/09/2015 24     LDL Cholesterol (mg/dL)   Date Value   10/09/2015 119     LDL Calculated (mg/dL)   Date Value   06/14/2017 87     AST (U/L)   Date Value   10/09/2015 32     ALT (U/L)   Date Value   10/09/2015 66 (H)     BUN (mg/dL)   Date Value   10/09/2015 14      (goal A1C is < 7)   (goal LDL is <100) need 30-50% reduction from baseline     BP Readings from Last 3 Encounters:   11/14/17 (!) 148/84   11/03/17 (!) 145/86   10/20/17 (!) 150/90    (goal /80)      All Future Testing planned in [facility]:  Lab Frequency Next Occurrence   Basic Metabolic Panel Once 42/20/0380   Hemoglobin A1C Once 01/30/2018       Next Visit Date:  No future appointments.          Patient Active Problem List:     GERD (gastroesophageal reflux disease)     Carpal tunnel syndrome of right wrist     Benign essential HTN     Diabetic peripheral neuropathy (HCC)     Mixed hyperlipidemia     Vitamin D deficiency     Leg pain, left     Microalbuminuria     PVD (peripheral vascular disease) ([facility] [address]     Need for influenza vaccination

## 2018-02-21 DIAGNOSIS — E11.42 TYPE 2 DIABETES MELLITUS WITH DIABETIC POLYNEUROPATHY, WITH LONG-TERM CURRENT USE OF INSULIN (HCC): ICD-10-CM

## 2018-02-21 DIAGNOSIS — E11.00 UNCONTROLLED TYPE 2 DIABETES MELLITUS WITH HYPEROSMOLARITY WITHOUT COMA, WITH LONG-TERM CURRENT USE OF INSULIN (HCC): ICD-10-CM

## 2018-02-21 DIAGNOSIS — Z79.4 UNCONTROLLED TYPE 2 DIABETES MELLITUS WITH HYPEROSMOLARITY WITHOUT COMA, WITH LONG-TERM CURRENT USE OF INSULIN (HCC): ICD-10-CM

## 2018-02-21 DIAGNOSIS — K21.9 GASTROESOPHAGEAL REFLUX DISEASE WITHOUT ESOPHAGITIS: ICD-10-CM

## 2018-02-21 DIAGNOSIS — S46.819A TRAPEZIUS STRAIN, UNSPECIFIED LATERALITY, INITIAL ENCOUNTER: ICD-10-CM

## 2018-02-21 DIAGNOSIS — Z79.4 TYPE 2 DIABETES MELLITUS WITH DIABETIC POLYNEUROPATHY, WITH LONG-TERM CURRENT USE OF INSULIN (HCC): ICD-10-CM

## 2018-02-21 RX ORDER — METOPROLOL TARTRATE 50 MG/1
TABLET, FILM COATED ORAL
Qty: 180 TABLET | Refills: 1 | Status: SHIPPED | OUTPATIENT
Start: 2018-02-21 | End: 2018-07-25 | Stop reason: SDUPTHER

## 2018-02-21 RX ORDER — ESCITALOPRAM OXALATE 10 MG/1
TABLET ORAL
Qty: 135 TABLET | Refills: 1 | Status: SHIPPED | OUTPATIENT
Start: 2018-02-21 | End: 2018-07-25 | Stop reason: SDUPTHER

## 2018-02-21 RX ORDER — MELOXICAM 7.5 MG/1
TABLET ORAL
Qty: 180 TABLET | Refills: 1 | Status: SHIPPED | OUTPATIENT
Start: 2018-02-21 | End: 2018-07-25 | Stop reason: SDUPTHER

## 2018-02-21 RX ORDER — HYDROXYZINE PAMOATE 50 MG/1
CAPSULE ORAL
Qty: 180 CAPSULE | Refills: 1 | Status: SHIPPED | OUTPATIENT
Start: 2018-02-21 | End: 2019-10-15

## 2018-02-21 RX ORDER — PANTOPRAZOLE SODIUM 40 MG/1
TABLET, DELAYED RELEASE ORAL
Qty: 180 TABLET | Refills: 1 | Status: SHIPPED | OUTPATIENT
Start: 2018-02-21 | End: 2018-07-19 | Stop reason: SDUPTHER

## 2018-03-27 DIAGNOSIS — E78.2 MIXED HYPERLIPIDEMIA: Primary | ICD-10-CM

## 2018-03-28 LAB
AVERAGE GLUCOSE: NORMAL
CHOLESTEROL, TOTAL: 170 MG/DL
CHOLESTEROL/HDL RATIO: NORMAL
HBA1C MFR BLD: 10.3 %
HDLC SERPL-MCNC: 36 MG/DL (ref 35–70)
LDL CHOLESTEROL CALCULATED: 91 MG/DL (ref 0–160)
TRIGL SERPL-MCNC: 213 MG/DL
VLDLC SERPL CALC-MCNC: 43 MG/DL

## 2018-03-30 DIAGNOSIS — Z79.4 UNCONTROLLED TYPE 2 DIABETES MELLITUS WITH HYPEROSMOLARITY WITHOUT COMA, WITH LONG-TERM CURRENT USE OF INSULIN (HCC): ICD-10-CM

## 2018-03-30 DIAGNOSIS — E78.2 MIXED HYPERLIPIDEMIA: ICD-10-CM

## 2018-03-30 DIAGNOSIS — E11.00 UNCONTROLLED TYPE 2 DIABETES MELLITUS WITH HYPEROSMOLARITY WITHOUT COMA, WITH LONG-TERM CURRENT USE OF INSULIN (HCC): ICD-10-CM

## 2018-06-19 DIAGNOSIS — E11.00 UNCONTROLLED TYPE 2 DIABETES MELLITUS WITH HYPEROSMOLARITY WITHOUT COMA, WITH LONG-TERM CURRENT USE OF INSULIN (HCC): ICD-10-CM

## 2018-06-19 DIAGNOSIS — Z79.4 UNCONTROLLED TYPE 2 DIABETES MELLITUS WITH HYPEROSMOLARITY WITHOUT COMA, WITH LONG-TERM CURRENT USE OF INSULIN (HCC): ICD-10-CM

## 2018-06-19 RX ORDER — CALCIUM CITRATE/VITAMIN D3 200MG-6.25
TABLET ORAL
Qty: 50 EACH | Refills: 11 | Status: SHIPPED | OUTPATIENT
Start: 2018-06-19

## 2018-07-14 DIAGNOSIS — I10 BENIGN ESSENTIAL HTN: ICD-10-CM

## 2018-07-16 RX ORDER — LOSARTAN POTASSIUM 100 MG/1
TABLET ORAL
Qty: 90 TABLET | Refills: 1 | Status: SHIPPED | OUTPATIENT
Start: 2018-07-16 | End: 2018-12-28 | Stop reason: SDUPTHER

## 2018-07-19 DIAGNOSIS — K21.9 GASTROESOPHAGEAL REFLUX DISEASE WITHOUT ESOPHAGITIS: ICD-10-CM

## 2018-07-20 RX ORDER — PANTOPRAZOLE SODIUM 40 MG/1
TABLET, DELAYED RELEASE ORAL
Qty: 180 TABLET | Refills: 1 | Status: SHIPPED | OUTPATIENT
Start: 2018-07-20 | End: 2019-01-01 | Stop reason: SDUPTHER

## 2018-07-24 ENCOUNTER — OFFICE VISIT (OUTPATIENT)
Dept: FAMILY MEDICINE CLINIC | Age: 48
End: 2018-07-24
Payer: COMMERCIAL

## 2018-07-24 VITALS
WEIGHT: 195 LBS | HEIGHT: 69 IN | DIASTOLIC BLOOD PRESSURE: 86 MMHG | HEART RATE: 73 BPM | SYSTOLIC BLOOD PRESSURE: 136 MMHG | BODY MASS INDEX: 28.88 KG/M2

## 2018-07-24 DIAGNOSIS — E55.9 VITAMIN D DEFICIENCY: ICD-10-CM

## 2018-07-24 DIAGNOSIS — K21.9 GASTROESOPHAGEAL REFLUX DISEASE WITHOUT ESOPHAGITIS: ICD-10-CM

## 2018-07-24 DIAGNOSIS — E11.00 UNCONTROLLED TYPE 2 DIABETES MELLITUS WITH HYPEROSMOLARITY WITHOUT COMA, WITH LONG-TERM CURRENT USE OF INSULIN (HCC): ICD-10-CM

## 2018-07-24 DIAGNOSIS — I73.9 PVD (PERIPHERAL VASCULAR DISEASE) (HCC): ICD-10-CM

## 2018-07-24 DIAGNOSIS — E78.2 MIXED HYPERLIPIDEMIA: ICD-10-CM

## 2018-07-24 DIAGNOSIS — E11.42 DIABETIC PERIPHERAL NEUROPATHY (HCC): ICD-10-CM

## 2018-07-24 DIAGNOSIS — Z79.4 UNCONTROLLED TYPE 2 DIABETES MELLITUS WITH HYPEROSMOLARITY WITHOUT COMA, WITH LONG-TERM CURRENT USE OF INSULIN (HCC): ICD-10-CM

## 2018-07-24 DIAGNOSIS — I10 BENIGN ESSENTIAL HTN: Primary | ICD-10-CM

## 2018-07-24 DIAGNOSIS — R80.9 MICROALBUMINURIA: ICD-10-CM

## 2018-07-24 LAB — HBA1C MFR BLD: 10 %

## 2018-07-24 PROCEDURE — G8417 CALC BMI ABV UP PARAM F/U: HCPCS | Performed by: INTERNAL MEDICINE

## 2018-07-24 PROCEDURE — 99214 OFFICE O/P EST MOD 30 MIN: CPT | Performed by: INTERNAL MEDICINE

## 2018-07-24 PROCEDURE — 3046F HEMOGLOBIN A1C LEVEL >9.0%: CPT | Performed by: INTERNAL MEDICINE

## 2018-07-24 PROCEDURE — 1036F TOBACCO NON-USER: CPT | Performed by: INTERNAL MEDICINE

## 2018-07-24 PROCEDURE — G8427 DOCREV CUR MEDS BY ELIG CLIN: HCPCS | Performed by: INTERNAL MEDICINE

## 2018-07-24 PROCEDURE — 83036 HEMOGLOBIN GLYCOSYLATED A1C: CPT | Performed by: INTERNAL MEDICINE

## 2018-07-24 PROCEDURE — 2022F DILAT RTA XM EVC RTNOPTHY: CPT | Performed by: INTERNAL MEDICINE

## 2018-07-24 ASSESSMENT — ENCOUNTER SYMPTOMS
DIARRHEA: 0
BLOOD IN STOOL: 0
ABDOMINAL PAIN: 0
SORE THROAT: 0
SHORTNESS OF BREATH: 0
RESPIRATORY NEGATIVE: 1
NAUSEA: 0
CONSTIPATION: 0

## 2018-07-24 ASSESSMENT — PATIENT HEALTH QUESTIONNAIRE - PHQ9
1. LITTLE INTEREST OR PLEASURE IN DOING THINGS: 0
SUM OF ALL RESPONSES TO PHQ QUESTIONS 1-9: 0
SUM OF ALL RESPONSES TO PHQ9 QUESTIONS 1 & 2: 0
2. FEELING DOWN, DEPRESSED OR HOPELESS: 0

## 2018-07-24 NOTE — PROGRESS NOTES
Subjective:      Patient ID: Brenna Morales is a 53 y.o. male. Samantha Ernandez presents for a check up on his medical conditions. Samantha Ernandez denies new problems. Medications were reviewed with Samantha Ernandez, he is  tolerating the medication. Bowels are regular. There has not been rectal bleeding. Samantha Ernandez denies urinary complications, the urine stream is good. Samantha rEnandez states he is frequently urinating. Preston denies chest pain and denies increasing shortness of breath. Samantha Ernandez continues to follow with Vascular surgery in [facility] [employer].     Lab Results       Component                Value               Date                       NA                       137                 10/09/2015                 K                        4.7                 10/09/2015                 CL                       97 (L)              10/09/2015                 CO2                      27                  10/09/2015                 BUN                      14                  10/09/2015                 CREATININE               1.02                10/09/2015                 GLUCOSE                  142 (H)             10/09/2015                 CALCIUM                  10.2                10/09/2015                 PROT                     7.2                 10/09/2015                 LABALBU                  4.7                 10/09/2015                 BILITOT                  0.33                10/09/2015                 ALKPHOS                  75                  10/09/2015                 AST                      32                  10/09/2015                 ALT                      66 (H)              10/09/2015                 LABGLOM                  >60                 10/09/2015                 GFRAA                    >60                 10/09/2015              Lab Results       Component                Value               Date                       LABA1C                   10.3                03/28/2018            Lab Results       Component Value               Date                       EAG                      157                 10/09/2015              Lab Results       Component                Value               Date                       CHOL                     170                 03/28/2018                 CHOL                     146                 06/14/2017                 CHOL                     163                 10/19/2016            Lab Results       Component                Value               Date                       TRIG                     213                 03/28/2018                 TRIG                     125                 06/14/2017                 TRIG                     159                 10/19/2016            Lab Results       Component                Value               Date                       HDL                      36                  03/28/2018                 HDL                      34 (A)              06/14/2017                 HDL                      47                  10/19/2016            Lab Results       Component                Value               Date                       LDLCHOLESTEROL           119                 10/09/2015                 LDLCHOLESTEROL           73                  04/02/2015                 LDLCHOLESTEROL           80                  09/10/2014                 LDLCALC                  91                  03/28/2018                 LDLCALC                  87                  06/14/2017                 LDLCALC                  84                  10/19/2016            Lab Results       Component                Value               Date                       VLDL                     43                  03/28/2018                 VLDL                     25                  06/14/2017                 VLDL                     22                  03/30/2016            Lab Results       Component                Value               Date CHOLHDLRATIO             5.4 (H)             10/09/2015                 CHOLHDLRATIO             2.9                 04/02/2015                 CHOLHDLRATIO             3.0                 09/10/2014                              Diabetes   He presents for his follow-up diabetic visit. He has type 2 diabetes mellitus. His disease course has been fluctuating. There are no hypoglycemic associated symptoms. Pertinent negatives for diabetes include no chest pain. There are no hypoglycemic complications. Symptoms are worsening. Risk factors for coronary artery disease include diabetes mellitus, dyslipidemia, hypertension and male sex. Current diabetic treatment includes oral agent (monotherapy). He is compliant with treatment all of the time. His weight is stable. He is following a diabetic diet. An ACE inhibitor/angiotensin II receptor blocker is being taken. He does not see a podiatrist.Eye exam is not current. Hypertension   This is a chronic problem. The current episode started more than 1 year ago. The problem is unchanged. The problem is controlled. Pertinent negatives include no chest pain, neck pain, palpitations or shortness of breath. Past treatments include angiotensin blockers, beta blockers, diuretics and calcium channel blockers. The current treatment provides significant improvement. There are no compliance problems. There is no history of angina. Hyperlipidemia   This is a chronic problem. The current episode started more than 1 year ago. The problem is controlled. Recent lipid tests were reviewed and are normal. Pertinent negatives include no chest pain, myalgias or shortness of breath. Current antihyperlipidemic treatment includes statins. The current treatment provides significant improvement of lipids. There are no compliance problems. Gastroesophageal Reflux   He reports no abdominal pain, no chest pain, no nausea or no sore throat. This is a chronic problem.  The current episode started more vascular disease) ([facility]     8. Uncontrolled type 2 diabetes mellitus with hyperosmolarity without coma, with long-term current use of insulin (HCC)  POCT glycosylated hemoglobin (Hb A1C)    insulin detemir (LEVEMIR FLEXTOUCH) 100 UNIT/ML injection pen    Comprehensive Metabolic Panel    Hemoglobin A1C    Microalbumin / Creatinine Urine Ratio           Plan:      1. Benign essential HTN  Preston was instructed to continue his current medication regimen. 2. Mixed hyperlipidemia  Preston was instructed to continue his current medication regimen. Obtain labs approximately one week prior to the office appointment. Please fast for 12 hours prior to obtaining the labs. Water or black coffee (no cream or sugar) is allowed prior to the the labs. 3. Gastroesophageal reflux disease without esophagitis  Continue on PPI daily. 4. Vitamin D deficiency  Go back on Vitamin D daily (at least 1000 units). 5. Microalbuminuria  Taking an ARB currently. 6. Diabetic peripheral neuropathy (HCC)  Stable. 7. PVD (peripheral vascular disease) ([facility]  Continue to follow with Vascular surgery. 8. Uncontrolled type 2 diabetes mellitus with hyperosmolarity without coma, with long-term current use of insulin (HCC)  HgbA1C today - 10  Increase levemir to 100 units daily. Repeat HgbA1C in 3 months. Belinda Howe was instructed to follow up in the clinic in 3 months for check up or as needed with any medical issues.

## 2018-07-24 NOTE — PROGRESS NOTES
monitoring  03/28/2019    Creatinine monitoring  03/28/2019    DTaP/Tdap/Td vaccine (2 - Td) 11/19/2022    Pneumococcal med risk  Completed

## 2018-07-25 DIAGNOSIS — Z79.4 TYPE 2 DIABETES MELLITUS WITH DIABETIC POLYNEUROPATHY, WITH LONG-TERM CURRENT USE OF INSULIN (HCC): ICD-10-CM

## 2018-07-25 DIAGNOSIS — E11.42 TYPE 2 DIABETES MELLITUS WITH DIABETIC POLYNEUROPATHY, WITH LONG-TERM CURRENT USE OF INSULIN (HCC): ICD-10-CM

## 2018-07-25 DIAGNOSIS — S46.819A TRAPEZIUS STRAIN, UNSPECIFIED LATERALITY, INITIAL ENCOUNTER: ICD-10-CM

## 2018-07-25 DIAGNOSIS — E11.00 UNCONTROLLED TYPE 2 DIABETES MELLITUS WITH HYPEROSMOLARITY WITHOUT COMA, WITH LONG-TERM CURRENT USE OF INSULIN (HCC): ICD-10-CM

## 2018-07-25 DIAGNOSIS — Z79.4 UNCONTROLLED TYPE 2 DIABETES MELLITUS WITH HYPEROSMOLARITY WITHOUT COMA, WITH LONG-TERM CURRENT USE OF INSULIN (HCC): ICD-10-CM

## 2018-07-26 RX ORDER — METOPROLOL TARTRATE 50 MG/1
TABLET, FILM COATED ORAL
Qty: 180 TABLET | Refills: 1 | Status: SHIPPED | OUTPATIENT
Start: 2018-07-26 | End: 2019-03-26 | Stop reason: SDUPTHER

## 2018-07-26 RX ORDER — MELOXICAM 7.5 MG/1
TABLET ORAL
Qty: 180 TABLET | Refills: 1 | Status: SHIPPED | OUTPATIENT
Start: 2018-07-26 | End: 2019-03-26 | Stop reason: SDUPTHER

## 2018-07-26 RX ORDER — INSULIN DETEMIR 100 [IU]/ML
INJECTION, SOLUTION SUBCUTANEOUS
Qty: 90 ML | Refills: 1 | Status: SHIPPED | OUTPATIENT
Start: 2018-07-26 | End: 2018-08-14 | Stop reason: SDUPTHER

## 2018-07-26 RX ORDER — ESCITALOPRAM OXALATE 10 MG/1
TABLET ORAL
Qty: 135 TABLET | Refills: 1 | Status: SHIPPED | OUTPATIENT
Start: 2018-07-26 | End: 2019-04-16 | Stop reason: SDUPTHER

## 2018-07-26 NOTE — TELEPHONE ENCOUNTER
Health Maintenance   Topic Date Due    HIV screen  04/15/1986    Diabetic foot exam  10/08/2016    Diabetic retinal exam  10/08/2016    Diabetic microalbuminuria test  03/30/2017    Flu vaccine (1) 09/01/2018    A1C test (Diabetic or Prediabetic)  10/24/2018    Lipid screen  03/28/2019    Potassium monitoring  03/28/2019    Creatinine monitoring  03/28/2019    DTaP/Tdap/Td vaccine (2 - Td) 11/19/2022    Pneumococcal med risk  Completed             (applicable per patient's age: Cancer Screenings, Depression Screening, Fall Risk Screening, Immunizations)    Hemoglobin A1C (%)   Date Value   07/24/2018 10.0   03/28/2018 10.3   10/20/2017 8.4     Microalb/Crt.  Ratio (mcg/mg creat)   Date Value   10/09/2015 24     LDL Cholesterol (mg/dL)   Date Value   10/09/2015 119     LDL Calculated (mg/dL)   Date Value   03/28/2018 91     AST (U/L)   Date Value   10/09/2015 32     ALT (U/L)   Date Value   10/09/2015 66 (H)     BUN (mg/dL)   Date Value   10/09/2015 14      (goal A1C is < 7)   (goal LDL is <100) need 30-50% reduction from baseline     BP Readings from Last 3 Encounters:   07/24/18 136/86   11/14/17 (!) 148/84   11/03/17 (!) 145/86    (goal /80)      All Future Testing planned in [facility]:  Lab Frequency Next Occurrence   Comprehensive Metabolic Panel Once 59/47/1331   Hemoglobin A1C Once 10/24/2018   Lipid Panel Once 10/24/2018   Microalbumin / Creatinine Urine Ratio Once 10/24/2018   Vitamin D 25 Hydroxy Once 10/24/2018       Next Visit Date:  Future Appointments  Date Time Provider Dwaine Pal   10/29/2018 10:45 [facility] [address] [facility]            Patient Active Problem List:     GERD (gastroesophageal reflux disease)     Carpal tunnel syndrome of right wrist     Benign essential HTN     Diabetic peripheral neuropathy ([facility]     Mixed hyperlipidemia     Vitamin D deficiency     Leg pain, left     Microalbuminuria     PVD (peripheral vascular disease) ([facility]     Need for influenza

## 2018-07-30 DIAGNOSIS — E78.2 MIXED HYPERLIPIDEMIA: ICD-10-CM

## 2018-07-31 RX ORDER — SAXAGLIPTIN 5 MG/1
TABLET, FILM COATED ORAL
Qty: 90 TABLET | Refills: 1 | Status: SHIPPED | OUTPATIENT
Start: 2018-07-31 | End: 2019-03-31 | Stop reason: SDUPTHER

## 2018-07-31 RX ORDER — ROSUVASTATIN CALCIUM 20 MG/1
TABLET, COATED ORAL
Qty: 90 TABLET | Refills: 1 | Status: SHIPPED | OUTPATIENT
Start: 2018-07-31 | End: 2019-01-17

## 2018-08-14 ENCOUNTER — TELEPHONE (OUTPATIENT)
Dept: FAMILY MEDICINE CLINIC | Age: 48
End: 2018-08-14

## 2018-08-14 DIAGNOSIS — Z79.4 UNCONTROLLED TYPE 2 DIABETES MELLITUS WITH HYPEROSMOLARITY WITHOUT COMA, WITH LONG-TERM CURRENT USE OF INSULIN (HCC): ICD-10-CM

## 2018-08-14 DIAGNOSIS — E11.00 UNCONTROLLED TYPE 2 DIABETES MELLITUS WITH HYPEROSMOLARITY WITHOUT COMA, WITH LONG-TERM CURRENT USE OF INSULIN (HCC): ICD-10-CM

## 2018-08-14 NOTE — TELEPHONE ENCOUNTER
Increase Levemir to 50 mg two times a day. Call next week with morning BS readings. I would recommend a referral to pain mgt if his pain is getting worse and needs stronger medication. Find out where he would like to go.
Pt advised to Increase Levemir to 50 mg two times a day. He states this is the does he is currently on. Patient also states he does not want to do pain mgmt. He has tried this and they want him to take the medication daily and he doesn't feel he needs to take daily. He only wants to take medication periodically when he over does it.
Pt advised to Increase Levemir to 55 units BID. appt scheduled to discuss periodic pain medication for arm pain.
Patient Active Problem List:     GERD (gastroesophageal reflux disease)     Carpal tunnel syndrome of right wrist     Benign essential HTN     Diabetic peripheral neuropathy (HCC)     Mixed hyperlipidemia     Vitamin D deficiency     Leg pain, left     Microalbuminuria     PVD (peripheral vascular disease) ([facility]     Need for influenza vaccination

## 2018-08-15 RX ORDER — HYDROCHLOROTHIAZIDE 25 MG/1
TABLET ORAL
Qty: 90 TABLET | Refills: 1 | Status: SHIPPED | OUTPATIENT
Start: 2018-08-15 | End: 2019-01-25 | Stop reason: SDUPTHER

## 2018-08-15 NOTE — TELEPHONE ENCOUNTER
(peripheral vascular disease) ([facility]     Need for influenza vaccination

## 2018-10-08 ENCOUNTER — NURSE ONLY (OUTPATIENT)
Dept: FAMILY MEDICINE CLINIC | Age: 48
End: 2018-10-08
Payer: COMMERCIAL

## 2018-10-08 ENCOUNTER — HOSPITAL ENCOUNTER (OUTPATIENT)
Age: 48
Setting detail: SPECIMEN
Discharge: HOME | End: 2018-10-08
Payer: COMMERCIAL

## 2018-10-08 DIAGNOSIS — E78.2 MIXED HYPERLIPIDEMIA: ICD-10-CM

## 2018-10-08 DIAGNOSIS — E55.9 VITAMIN D DEFICIENCY: ICD-10-CM

## 2018-10-08 DIAGNOSIS — E11.00 UNCONTROLLED TYPE 2 DIABETES MELLITUS WITH HYPEROSMOLARITY WITHOUT COMA, WITH LONG-TERM CURRENT USE OF INSULIN (HCC): ICD-10-CM

## 2018-10-08 DIAGNOSIS — R80.9 MICROALBUMINURIA: Primary | ICD-10-CM

## 2018-10-08 DIAGNOSIS — Z79.4 UNCONTROLLED TYPE 2 DIABETES MELLITUS WITH HYPEROSMOLARITY WITHOUT COMA, WITH LONG-TERM CURRENT USE OF INSULIN (HCC): ICD-10-CM

## 2018-10-08 DIAGNOSIS — I10 BENIGN ESSENTIAL HTN: ICD-10-CM

## 2018-10-08 LAB
ALBUMIN SERPL-MCNC: 5 G/DL (ref 3.5–5.2)
ALBUMIN/GLOBULIN RATIO: ABNORMAL (ref 1–2.5)
ALP BLD-CCNC: 102 U/L (ref 40–129)
ALT SERPL-CCNC: 53 U/L (ref 5–41)
ANION GAP SERPL CALCULATED.3IONS-SCNC: 11 MMOL/L (ref 9–17)
AST SERPL-CCNC: 25 U/L
BILIRUB SERPL-MCNC: 0.34 MG/DL (ref 0.3–1.2)
BUN BLDV-MCNC: 12 MG/DL (ref 6–20)
BUN/CREAT BLD: 13 (ref 9–20)
CALCIUM SERPL-MCNC: 10.5 MG/DL (ref 8.6–10.4)
CHLORIDE BLD-SCNC: 102 MMOL/L (ref 98–107)
CHOLESTEROL/HDL RATIO: 5.1
CHOLESTEROL: 188 MG/DL
CO2: 26 MMOL/L (ref 20–31)
CREAT SERPL-MCNC: 0.92 MG/DL (ref 0.7–1.2)
ESTIMATED AVERAGE GLUCOSE: 209 MG/DL
GFR AFRICAN AMERICAN: >60 ML/MIN
GFR NON-AFRICAN AMERICAN: >60 ML/MIN
GFR SERPL CREATININE-BSD FRML MDRD: ABNORMAL ML/MIN/{1.73_M2}
GFR SERPL CREATININE-BSD FRML MDRD: ABNORMAL ML/MIN/{1.73_M2}
GLUCOSE BLD-MCNC: 261 MG/DL (ref 70–99)
HBA1C MFR BLD: 8.9 % (ref 4.8–5.9)
HDLC SERPL-MCNC: 37 MG/DL
LDL CHOLESTEROL: 129 MG/DL (ref 0–130)
PATIENT FASTING?: YES
POTASSIUM SERPL-SCNC: 4.8 MMOL/L (ref 3.7–5.3)
SODIUM BLD-SCNC: 139 MMOL/L (ref 135–144)
TOTAL PROTEIN: 7.6 G/DL (ref 6.4–8.3)
TRIGL SERPL-MCNC: 109 MG/DL
VITAMIN D 25-HYDROXY: 38 NG/ML (ref 30–100)
VLDLC SERPL CALC-MCNC: ABNORMAL MG/DL (ref 1–30)

## 2018-10-08 PROCEDURE — 80053 COMPREHEN METABOLIC PANEL: CPT

## 2018-10-08 PROCEDURE — 90686 IIV4 VACC NO PRSV 0.5 ML IM: CPT | Performed by: INTERNAL MEDICINE

## 2018-10-08 PROCEDURE — 82043 UR ALBUMIN QUANTITATIVE: CPT

## 2018-10-08 PROCEDURE — 82570 ASSAY OF URINE CREATININE: CPT

## 2018-10-08 PROCEDURE — 83036 HEMOGLOBIN GLYCOSYLATED A1C: CPT

## 2018-10-08 PROCEDURE — 82306 VITAMIN D 25 HYDROXY: CPT

## 2018-10-08 PROCEDURE — 80061 LIPID PANEL: CPT

## 2018-10-08 PROCEDURE — 90471 IMMUNIZATION ADMIN: CPT | Performed by: INTERNAL MEDICINE

## 2018-10-09 ENCOUNTER — OFFICE VISIT (OUTPATIENT)
Dept: FAMILY MEDICINE CLINIC | Age: 48
End: 2018-10-09
Payer: COMMERCIAL

## 2018-10-09 VITALS
HEART RATE: 85 BPM | HEIGHT: 69 IN | BODY MASS INDEX: 29.03 KG/M2 | DIASTOLIC BLOOD PRESSURE: 85 MMHG | SYSTOLIC BLOOD PRESSURE: 139 MMHG | WEIGHT: 196 LBS

## 2018-10-09 DIAGNOSIS — E78.2 MIXED HYPERLIPIDEMIA: ICD-10-CM

## 2018-10-09 DIAGNOSIS — E11.00 UNCONTROLLED TYPE 2 DIABETES MELLITUS WITH HYPEROSMOLARITY WITHOUT COMA, WITH LONG-TERM CURRENT USE OF INSULIN (HCC): Primary | ICD-10-CM

## 2018-10-09 DIAGNOSIS — Z79.4 UNCONTROLLED TYPE 2 DIABETES MELLITUS WITH HYPEROSMOLARITY WITHOUT COMA, WITH LONG-TERM CURRENT USE OF INSULIN (HCC): Primary | ICD-10-CM

## 2018-10-09 DIAGNOSIS — K21.9 GASTROESOPHAGEAL REFLUX DISEASE WITHOUT ESOPHAGITIS: ICD-10-CM

## 2018-10-09 DIAGNOSIS — I10 BENIGN ESSENTIAL HTN: ICD-10-CM

## 2018-10-09 DIAGNOSIS — E55.9 VITAMIN D DEFICIENCY: ICD-10-CM

## 2018-10-09 DIAGNOSIS — E11.42 DIABETIC PERIPHERAL NEUROPATHY (HCC): ICD-10-CM

## 2018-10-09 LAB
CREATININE URINE: 119.9 MG/DL (ref 39–259)
MICROALBUMIN/CREAT 24H UR: 72 MG/L
MICROALBUMIN/CREAT UR-RTO: 60 MCG/MG CREAT

## 2018-10-09 PROCEDURE — G8427 DOCREV CUR MEDS BY ELIG CLIN: HCPCS | Performed by: INTERNAL MEDICINE

## 2018-10-09 PROCEDURE — 3045F PR MOST RECENT HEMOGLOBIN A1C LEVEL 7.0-9.0%: CPT | Performed by: INTERNAL MEDICINE

## 2018-10-09 PROCEDURE — 1036F TOBACCO NON-USER: CPT | Performed by: INTERNAL MEDICINE

## 2018-10-09 PROCEDURE — 99214 OFFICE O/P EST MOD 30 MIN: CPT | Performed by: INTERNAL MEDICINE

## 2018-10-09 PROCEDURE — G8417 CALC BMI ABV UP PARAM F/U: HCPCS | Performed by: INTERNAL MEDICINE

## 2018-10-09 PROCEDURE — 2022F DILAT RTA XM EVC RTNOPTHY: CPT | Performed by: INTERNAL MEDICINE

## 2018-10-09 PROCEDURE — G8482 FLU IMMUNIZE ORDER/ADMIN: HCPCS | Performed by: INTERNAL MEDICINE

## 2018-10-09 ASSESSMENT — ENCOUNTER SYMPTOMS
NAUSEA: 0
DIARRHEA: 0
SORE THROAT: 0
RESPIRATORY NEGATIVE: 1
SHORTNESS OF BREATH: 0
BLOOD IN STOOL: 0
ABDOMINAL PAIN: 0
CONSTIPATION: 0

## 2018-10-09 NOTE — PROGRESS NOTES
Chronic Disease Visit Information    BP Readings from Last 3 Encounters:   10/09/18 139/85   07/24/18 136/86   11/14/17 (!) 148/84          Hemoglobin A1C (%)   Date Value   10/08/2018 8.9 (H)   07/24/2018 10.0   03/28/2018 10.3     Microalb/Crt. Ratio (mcg/mg creat)   Date Value   10/09/2015 24     LDL Cholesterol (mg/dL)   Date Value   10/08/2018 129     LDL Calculated (mg/dL)   Date Value   03/28/2018 91     HDL (mg/dL)   Date Value   10/08/2018 37 (L)     BUN (mg/dL)   Date Value   10/08/2018 12     CREATININE (mg/dL)   Date Value   10/08/2018 0.92     Glucose (mg/dL)   Date Value   10/08/2018 261 (H)   02/24/2012 124 (H)            Have you changed or started any medications since your last visit including any over-the-counter medicines, vitamins, or herbal medicines? no   Are you having any side effects from any of your medications? -  no  Have you stopped taking any of your medications? Is so, why? -  no    Have you seen any other physician or provider since your last visit? No  Have you had any other diagnostic tests since your last visit? Yes - Records Obtained  Have you been seen in the emergency room and/or had an admission to a hospital since we last saw you? No  Have you had your annual diabetic retinal (eye) exam? Yes - Records Obtained  Have you had your routine dental cleaning in the past 6 months? yes -     Have you activated your [employer] account? If not, what are your barriers?  Yes     Patient Care Team:  Marline Esparza MD as PCP - General (Internal Medicine)  Jose C Jones (Vascular Surgery)         Medical History Review  Past Medical, Family, and Social History reviewed and does contribute to the patient presenting condition    Health Maintenance   Topic Date Due    HIV screen  04/15/1986    Diabetic foot exam  10/08/2016    Diabetic retinal exam  10/08/2016    Diabetic microalbuminuria test  03/30/2017    Lipid screen  03/28/2019    Potassium monitoring  03/28/2019    Creatinine
Date                       EAG                      209                 10/08/2018              Lab Results       Component                Value               Date                       CHOL                     188                 10/08/2018                 CHOL                     170                 03/28/2018                 CHOL                     146                 06/14/2017            Lab Results       Component                Value               Date                       TRIG                     109                 10/08/2018                 TRIG                     213                 03/28/2018                 TRIG                     125                 06/14/2017            Lab Results       Component                Value               Date                       HDL                      37 (L)              10/08/2018                 HDL                      36                  03/28/2018                 HDL                      34 (A)              06/14/2017            Lab Results       Component                Value               Date                       LDLCHOLESTEROL           129                 10/08/2018                 LDLCHOLESTEROL           119                 10/09/2015                 LDLCHOLESTEROL           73                  04/02/2015                 LDLCALC                  91                  03/28/2018                 LDLCALC                  87                  06/14/2017                 LDLCALC                  84                  10/19/2016            Lab Results       Component                Value               Date                       VLDL                     NOT REPORTED        10/08/2018                 VLDL                     43                  03/28/2018                 VLDL                     25                  06/14/2017            Lab Results       Component                Value               Date                       CHOLHDLRATIO             5.1 (H)

## 2018-12-28 DIAGNOSIS — I10 BENIGN ESSENTIAL HTN: ICD-10-CM

## 2018-12-31 RX ORDER — LOSARTAN POTASSIUM 100 MG/1
TABLET ORAL
Qty: 90 TABLET | Refills: 1 | Status: SHIPPED | OUTPATIENT
Start: 2018-12-31 | End: 2019-07-26 | Stop reason: SDUPTHER

## 2019-01-01 DIAGNOSIS — K21.9 GASTROESOPHAGEAL REFLUX DISEASE WITHOUT ESOPHAGITIS: ICD-10-CM

## 2019-01-02 RX ORDER — PANTOPRAZOLE SODIUM 40 MG/1
TABLET, DELAYED RELEASE ORAL
Qty: 180 TABLET | Refills: 1 | Status: SHIPPED | OUTPATIENT
Start: 2019-01-02 | End: 2019-07-26 | Stop reason: SDUPTHER

## 2019-01-14 ENCOUNTER — HOSPITAL ENCOUNTER (OUTPATIENT)
Age: 49
Setting detail: SPECIMEN
Discharge: HOME | End: 2019-01-14
Payer: COMMERCIAL

## 2019-01-14 ENCOUNTER — NURSE ONLY (OUTPATIENT)
Dept: FAMILY MEDICINE CLINIC | Age: 49
End: 2019-01-14
Payer: COMMERCIAL

## 2019-01-14 DIAGNOSIS — Z79.4 UNCONTROLLED TYPE 2 DIABETES MELLITUS WITH HYPEROSMOLARITY WITHOUT COMA, WITH LONG-TERM CURRENT USE OF INSULIN (HCC): ICD-10-CM

## 2019-01-14 DIAGNOSIS — E78.2 MIXED HYPERLIPIDEMIA: Primary | ICD-10-CM

## 2019-01-14 DIAGNOSIS — I10 BENIGN ESSENTIAL HTN: ICD-10-CM

## 2019-01-14 DIAGNOSIS — E11.21 TYPE 2 DIABETES MELLITUS WITH DIABETIC NEPHROPATHY, WITH LONG-TERM CURRENT USE OF INSULIN (HCC): ICD-10-CM

## 2019-01-14 DIAGNOSIS — E78.2 MIXED HYPERLIPIDEMIA: ICD-10-CM

## 2019-01-14 DIAGNOSIS — Z79.4 TYPE 2 DIABETES MELLITUS WITH DIABETIC NEPHROPATHY, WITH LONG-TERM CURRENT USE OF INSULIN (HCC): ICD-10-CM

## 2019-01-14 DIAGNOSIS — E11.00 UNCONTROLLED TYPE 2 DIABETES MELLITUS WITH HYPEROSMOLARITY WITHOUT COMA, WITH LONG-TERM CURRENT USE OF INSULIN (HCC): ICD-10-CM

## 2019-01-14 LAB
ALBUMIN SERPL-MCNC: 4.7 G/DL (ref 3.5–5.2)
ALBUMIN/GLOBULIN RATIO: ABNORMAL (ref 1–2.5)
ALP BLD-CCNC: 82 U/L (ref 40–129)
ALT SERPL-CCNC: 50 U/L (ref 5–41)
ANION GAP SERPL CALCULATED.3IONS-SCNC: 12 MMOL/L (ref 9–17)
AST SERPL-CCNC: 23 U/L
BILIRUB SERPL-MCNC: 0.29 MG/DL (ref 0.3–1.2)
BUN BLDV-MCNC: 19 MG/DL (ref 6–20)
BUN/CREAT BLD: 22 (ref 9–20)
CALCIUM SERPL-MCNC: 10 MG/DL (ref 8.6–10.4)
CHLORIDE BLD-SCNC: 100 MMOL/L (ref 98–107)
CHOLESTEROL/HDL RATIO: 6.3
CHOLESTEROL: 221 MG/DL
CO2: 27 MMOL/L (ref 20–31)
CREAT SERPL-MCNC: 0.87 MG/DL (ref 0.7–1.2)
ESTIMATED AVERAGE GLUCOSE: 203 MG/DL
GFR AFRICAN AMERICAN: >60 ML/MIN
GFR NON-AFRICAN AMERICAN: >60 ML/MIN
GFR SERPL CREATININE-BSD FRML MDRD: ABNORMAL ML/MIN/{1.73_M2}
GFR SERPL CREATININE-BSD FRML MDRD: ABNORMAL ML/MIN/{1.73_M2}
GLUCOSE BLD-MCNC: 230 MG/DL (ref 70–99)
HBA1C MFR BLD: 8.7 % (ref 4.8–5.9)
HDLC SERPL-MCNC: 35 MG/DL
LDL CHOLESTEROL: 139 MG/DL (ref 0–130)
PATIENT FASTING?: YES
POTASSIUM SERPL-SCNC: 4.2 MMOL/L (ref 3.7–5.3)
SODIUM BLD-SCNC: 139 MMOL/L (ref 135–144)
TOTAL PROTEIN: 7.1 G/DL (ref 6.4–8.3)
TRIGL SERPL-MCNC: 233 MG/DL
VLDLC SERPL CALC-MCNC: ABNORMAL MG/DL (ref 1–30)

## 2019-01-14 PROCEDURE — 83036 HEMOGLOBIN GLYCOSYLATED A1C: CPT

## 2019-01-14 PROCEDURE — 80061 LIPID PANEL: CPT

## 2019-01-14 PROCEDURE — 80053 COMPREHEN METABOLIC PANEL: CPT

## 2019-01-14 PROCEDURE — 36415 COLL VENOUS BLD VENIPUNCTURE: CPT | Performed by: INTERNAL MEDICINE

## 2019-01-17 ENCOUNTER — OFFICE VISIT (OUTPATIENT)
Dept: FAMILY MEDICINE CLINIC | Age: 49
End: 2019-01-17
Payer: COMMERCIAL

## 2019-01-17 VITALS
HEART RATE: 65 BPM | SYSTOLIC BLOOD PRESSURE: 122 MMHG | DIASTOLIC BLOOD PRESSURE: 83 MMHG | BODY MASS INDEX: 29.03 KG/M2 | WEIGHT: 196 LBS | HEIGHT: 69 IN

## 2019-01-17 DIAGNOSIS — E55.9 VITAMIN D DEFICIENCY: ICD-10-CM

## 2019-01-17 DIAGNOSIS — E78.2 MIXED HYPERLIPIDEMIA: ICD-10-CM

## 2019-01-17 DIAGNOSIS — I10 BENIGN ESSENTIAL HTN: Primary | ICD-10-CM

## 2019-01-17 DIAGNOSIS — Z79.4 UNCONTROLLED TYPE 2 DIABETES MELLITUS WITH HYPEROSMOLARITY WITHOUT COMA, WITH LONG-TERM CURRENT USE OF INSULIN (HCC): ICD-10-CM

## 2019-01-17 DIAGNOSIS — E11.00 UNCONTROLLED TYPE 2 DIABETES MELLITUS WITH HYPEROSMOLARITY WITHOUT COMA, WITH LONG-TERM CURRENT USE OF INSULIN (HCC): ICD-10-CM

## 2019-01-17 DIAGNOSIS — E11.42 DIABETIC PERIPHERAL NEUROPATHY (HCC): ICD-10-CM

## 2019-01-17 DIAGNOSIS — K21.9 GASTROESOPHAGEAL REFLUX DISEASE WITHOUT ESOPHAGITIS: ICD-10-CM

## 2019-01-17 DIAGNOSIS — R80.9 MICROALBUMINURIA: ICD-10-CM

## 2019-01-17 PROCEDURE — 3045F PR MOST RECENT HEMOGLOBIN A1C LEVEL 7.0-9.0%: CPT | Performed by: INTERNAL MEDICINE

## 2019-01-17 PROCEDURE — 2022F DILAT RTA XM EVC RTNOPTHY: CPT | Performed by: INTERNAL MEDICINE

## 2019-01-17 PROCEDURE — G8417 CALC BMI ABV UP PARAM F/U: HCPCS | Performed by: INTERNAL MEDICINE

## 2019-01-17 PROCEDURE — G8482 FLU IMMUNIZE ORDER/ADMIN: HCPCS | Performed by: INTERNAL MEDICINE

## 2019-01-17 PROCEDURE — 99214 OFFICE O/P EST MOD 30 MIN: CPT | Performed by: INTERNAL MEDICINE

## 2019-01-17 PROCEDURE — 1036F TOBACCO NON-USER: CPT | Performed by: INTERNAL MEDICINE

## 2019-01-17 PROCEDURE — G8427 DOCREV CUR MEDS BY ELIG CLIN: HCPCS | Performed by: INTERNAL MEDICINE

## 2019-01-17 RX ORDER — ROSUVASTATIN CALCIUM 40 MG/1
40 TABLET, COATED ORAL DAILY
Qty: 30 TABLET | Refills: 5 | Status: SHIPPED | OUTPATIENT
Start: 2019-01-17 | End: 2019-01-22 | Stop reason: DRUGHIGH

## 2019-01-17 ASSESSMENT — ENCOUNTER SYMPTOMS
BLOOD IN STOOL: 0
RESPIRATORY NEGATIVE: 1
SORE THROAT: 0
CONSTIPATION: 0
ABDOMINAL PAIN: 0
NAUSEA: 0
DIARRHEA: 0
SHORTNESS OF BREATH: 0

## 2019-01-22 ENCOUNTER — TELEPHONE (OUTPATIENT)
Dept: FAMILY MEDICINE CLINIC | Age: 49
End: 2019-01-22

## 2019-01-22 DIAGNOSIS — E78.2 MIXED HYPERLIPIDEMIA: ICD-10-CM

## 2019-01-22 RX ORDER — ROSUVASTATIN CALCIUM 40 MG/1
20 TABLET, COATED ORAL DAILY
Qty: 30 TABLET | Refills: 5 | Status: SHIPPED
Start: 2019-01-22 | End: 2019-04-01

## 2019-01-28 RX ORDER — HYDROCHLOROTHIAZIDE 25 MG/1
TABLET ORAL
Qty: 90 TABLET | Refills: 1 | Status: SHIPPED | OUTPATIENT
Start: 2019-01-28 | End: 2019-10-15

## 2019-03-26 DIAGNOSIS — Z79.4 UNCONTROLLED TYPE 2 DIABETES MELLITUS WITH HYPEROSMOLARITY WITHOUT COMA, WITH LONG-TERM CURRENT USE OF INSULIN (HCC): ICD-10-CM

## 2019-03-26 DIAGNOSIS — E11.00 UNCONTROLLED TYPE 2 DIABETES MELLITUS WITH HYPEROSMOLARITY WITHOUT COMA, WITH LONG-TERM CURRENT USE OF INSULIN (HCC): ICD-10-CM

## 2019-03-26 DIAGNOSIS — E11.42 TYPE 2 DIABETES MELLITUS WITH DIABETIC POLYNEUROPATHY, WITH LONG-TERM CURRENT USE OF INSULIN (HCC): ICD-10-CM

## 2019-03-26 DIAGNOSIS — S46.819A TRAPEZIUS STRAIN, UNSPECIFIED LATERALITY, INITIAL ENCOUNTER: ICD-10-CM

## 2019-03-26 DIAGNOSIS — Z79.4 TYPE 2 DIABETES MELLITUS WITH DIABETIC POLYNEUROPATHY, WITH LONG-TERM CURRENT USE OF INSULIN (HCC): ICD-10-CM

## 2019-03-27 RX ORDER — MELOXICAM 7.5 MG/1
TABLET ORAL
Qty: 180 TABLET | Refills: 1 | Status: SHIPPED | OUTPATIENT
Start: 2019-03-27 | End: 2019-10-15

## 2019-03-27 RX ORDER — INSULIN DETEMIR 100 [IU]/ML
INJECTION, SOLUTION SUBCUTANEOUS
Qty: 90 ML | Refills: 1 | Status: SHIPPED | OUTPATIENT
Start: 2019-03-27 | End: 2019-07-24

## 2019-03-27 RX ORDER — METOPROLOL TARTRATE 50 MG/1
TABLET, FILM COATED ORAL
Qty: 180 TABLET | Refills: 1 | Status: SHIPPED | OUTPATIENT
Start: 2019-03-27 | End: 2019-07-26 | Stop reason: SDUPTHER

## 2019-04-01 RX ORDER — SAXAGLIPTIN 5 MG/1
TABLET, FILM COATED ORAL
Qty: 90 TABLET | Refills: 1 | Status: SHIPPED | OUTPATIENT
Start: 2019-04-01 | End: 2019-07-26 | Stop reason: SDUPTHER

## 2019-04-01 RX ORDER — ROSUVASTATIN CALCIUM 20 MG/1
TABLET, COATED ORAL
Qty: 90 TABLET | Refills: 1 | Status: SHIPPED | OUTPATIENT
Start: 2019-04-01 | End: 2019-07-24

## 2019-04-01 NOTE — TELEPHONE ENCOUNTER
deficiency     Leg pain, left     Microalbuminuria     PVD (peripheral vascular disease) ([facility]

## 2019-04-16 RX ORDER — ESCITALOPRAM OXALATE 10 MG/1
TABLET ORAL
Qty: 135 TABLET | Refills: 1 | Status: SHIPPED | OUTPATIENT
Start: 2019-04-16 | End: 2019-07-26

## 2019-04-16 NOTE — TELEPHONE ENCOUNTER
Health Maintenance   Topic Date Due    HIV screen  04/15/1986    Hepatitis B Vaccine (1 of 3 - Risk 3-dose series) 04/15/1990    Diabetic foot exam  10/08/2016    Diabetic retinal exam  10/08/2016    Diabetic microalbuminuria test  10/08/2019    A1C test (Diabetic or Prediabetic)  01/14/2020    Lipid screen  01/14/2020    Potassium monitoring  01/14/2020    Creatinine monitoring  01/14/2020    DTaP/Tdap/Td vaccine (2 - Td) 11/19/2022    Flu vaccine  Completed    Pneumococcal 0-64 years Vaccine  Completed             (applicable per patient's age: Cancer Screenings, Depression Screening, Fall Risk Screening, Immunizations)    Hemoglobin A1C (%)   Date Value   01/14/2019 8.7 (H)   10/08/2018 8.9 (H)   07/24/2018 10.0     Microalb/Crt.  Ratio (mcg/mg creat)   Date Value   10/08/2018 60 (H)     LDL Cholesterol (mg/dL)   Date Value   01/14/2019 139 (H)     LDL Calculated (mg/dL)   Date Value   03/28/2018 91     AST (U/L)   Date Value   01/14/2019 23     ALT (U/L)   Date Value   01/14/2019 50 (H)     BUN (mg/dL)   Date Value   01/14/2019 19      (goal A1C is < 7)   (goal LDL is <100) need 30-50% reduction from baseline     BP Readings from Last 3 Encounters:   01/17/19 122/83   10/09/18 139/85   07/24/18 136/86    (goal /80)      All Future Testing planned in [facility]:  Lab Frequency Next Occurrence   Comprehensive Metabolic Panel Once 85/26/6828   Hemoglobin A1C Once 04/17/2019   Lipid Panel Once 04/17/2019   Vitamin D 25 Hydroxy Once 04/17/2019       Next Visit Date:  Future Appointments   Date Time Provider Dwaine Pal   5/3/2019  8:30 AM Doug Doan MD [facility]            Patient Active Problem List:     GERD (gastroesophageal reflux disease)     Carpal tunnel syndrome of right wrist     Benign essential HTN     Diabetic peripheral neuropathy (HCC)     Mixed hyperlipidemia     Vitamin D deficiency     Leg pain, left     Microalbuminuria     PVD (peripheral vascular disease) ([facility]

## 2019-07-24 ENCOUNTER — TELEPHONE (OUTPATIENT)
Dept: FAMILY MEDICINE CLINIC | Age: 49
End: 2019-07-24

## 2019-07-24 DIAGNOSIS — Z79.4 UNCONTROLLED TYPE 2 DIABETES MELLITUS WITH HYPEROSMOLARITY WITHOUT COMA, WITH LONG-TERM CURRENT USE OF INSULIN (HCC): ICD-10-CM

## 2019-07-24 DIAGNOSIS — E11.00 UNCONTROLLED TYPE 2 DIABETES MELLITUS WITH HYPEROSMOLARITY WITHOUT COMA, WITH LONG-TERM CURRENT USE OF INSULIN (HCC): ICD-10-CM

## 2019-07-24 RX ORDER — ROSUVASTATIN CALCIUM 40 MG/1
TABLET, COATED ORAL
COMMUNITY
Start: 2019-04-16 | End: 2019-07-26 | Stop reason: SDUPTHER

## 2019-07-26 ENCOUNTER — OFFICE VISIT (OUTPATIENT)
Dept: FAMILY MEDICINE CLINIC | Age: 49
End: 2019-07-26
Payer: MEDICARE

## 2019-07-26 VITALS
HEIGHT: 69 IN | DIASTOLIC BLOOD PRESSURE: 86 MMHG | WEIGHT: 190 LBS | BODY MASS INDEX: 28.14 KG/M2 | SYSTOLIC BLOOD PRESSURE: 130 MMHG | HEART RATE: 87 BPM

## 2019-07-26 DIAGNOSIS — K21.9 GASTROESOPHAGEAL REFLUX DISEASE WITHOUT ESOPHAGITIS: ICD-10-CM

## 2019-07-26 DIAGNOSIS — E11.00 UNCONTROLLED TYPE 2 DIABETES MELLITUS WITH HYPEROSMOLARITY WITHOUT COMA, WITH LONG-TERM CURRENT USE OF INSULIN (HCC): ICD-10-CM

## 2019-07-26 DIAGNOSIS — Z79.4 UNCONTROLLED TYPE 2 DIABETES MELLITUS WITH HYPEROSMOLARITY WITHOUT COMA, WITH LONG-TERM CURRENT USE OF INSULIN (HCC): ICD-10-CM

## 2019-07-26 DIAGNOSIS — I10 BENIGN ESSENTIAL HTN: ICD-10-CM

## 2019-07-26 DIAGNOSIS — R53.1 LEFT-SIDED WEAKNESS: Primary | ICD-10-CM

## 2019-07-26 DIAGNOSIS — E78.2 MIXED HYPERLIPIDEMIA: ICD-10-CM

## 2019-07-26 PROCEDURE — 99495 TRANSJ CARE MGMT MOD F2F 14D: CPT | Performed by: INTERNAL MEDICINE

## 2019-07-26 PROCEDURE — 1111F DSCHRG MED/CURRENT MED MERGE: CPT | Performed by: INTERNAL MEDICINE

## 2019-07-26 RX ORDER — PANTOPRAZOLE SODIUM 40 MG/1
TABLET, DELAYED RELEASE ORAL
Qty: 30 TABLET | Refills: 5 | Status: SHIPPED | OUTPATIENT
Start: 2019-07-26 | End: 2019-09-25 | Stop reason: SDUPTHER

## 2019-07-26 RX ORDER — LOSARTAN POTASSIUM 100 MG/1
TABLET ORAL
Qty: 30 TABLET | Refills: 5 | Status: SHIPPED | OUTPATIENT
Start: 2019-07-26 | End: 2019-10-15

## 2019-07-26 RX ORDER — ROSUVASTATIN CALCIUM 40 MG/1
40 TABLET, COATED ORAL DAILY
Qty: 30 TABLET | Refills: 5 | Status: SHIPPED | OUTPATIENT
Start: 2019-07-26 | End: 2019-10-15

## 2019-07-26 RX ORDER — METOPROLOL TARTRATE 50 MG/1
TABLET, FILM COATED ORAL
Qty: 60 TABLET | Refills: 5 | Status: SHIPPED | OUTPATIENT
Start: 2019-07-26 | End: 2020-01-13 | Stop reason: SDUPTHER

## 2019-07-26 NOTE — PROGRESS NOTES
the 300 range. A comprehensive review of systems was negative except for what was noted in the HPI. Vitals:    07/26/19 0758   BP: 130/86   Site: Right Upper Arm   Position: Sitting   Cuff Size: Large Adult   Pulse: 87   Weight: 190 lb (86.2 kg)   Height: 5' 9\" (1.753 m)     Body mass index is 28.06 kg/m². Wt Readings from Last 3 Encounters:   07/26/19 190 lb (86.2 kg)   01/17/19 196 lb (88.9 kg)   10/09/18 196 lb (88.9 kg)     BP Readings from Last 3 Encounters:   07/26/19 130/86   01/17/19 122/83   10/09/18 139/85        Physical Exam:  General Appearance: alert and oriented to person, place and time, well developed and well- nourished, in no acute distress  Skin: warm and dry, no rash or erythema  Head: normocephalic and atraumatic  Eyes: pupils equal, round, and reactive to light, extraocular eye movements intact, conjunctivae normal  ENT: tympanic membrane, external ear and ear canal normal bilaterally, nose without deformity, nasal mucosa and turbinates normal without polyps  Neck: supple and non-tender without mass, no thyromegaly or thyroid nodules, no cervical lymphadenopathy  Pulmonary/Chest: clear to auscultation bilaterally- no wheezes, rales or rhonchi, normal air movement, no respiratory distress  Cardiovascular: normal rate, regular rhythm, normal S1 and S2, no murmurs, rubs, clicks, or gallops, distal pulses intact, no carotid bruits  Abdomen: soft, non-tender, non-distended, normal bowel sounds, no masses or organomegaly  Extremities: no cyanosis, clubbing or edema  Musculoskeletal: normal range of motion, no joint swelling, deformity or tenderness  Neurologic: reflexes normal and symmetric, no cranial nerve deficit, gait, coordination and speech normal    Assessment/Plan:  1. Uncontrolled type 2 diabetes mellitus with hyperosmolarity without coma, with long-term current use of insulin (HCC)  Change Levemir to Tresiba 200 units every morning.   Call with morning blood sugar levels once a

## 2019-07-29 ENCOUNTER — TELEPHONE (OUTPATIENT)
Dept: FAMILY MEDICINE CLINIC | Age: 49
End: 2019-07-29

## 2019-07-29 DIAGNOSIS — E11.42 DIABETIC PERIPHERAL NEUROPATHY (HCC): Primary | ICD-10-CM

## 2019-07-29 RX ORDER — GABAPENTIN 100 MG/1
100 CAPSULE ORAL 2 TIMES DAILY
Qty: 60 CAPSULE | Refills: 5 | Status: SHIPPED | OUTPATIENT
Start: 2019-07-29 | End: 2019-10-15

## 2019-09-25 ENCOUNTER — TELEPHONE (OUTPATIENT)
Dept: FAMILY MEDICINE CLINIC | Age: 49
End: 2019-09-25

## 2019-09-25 DIAGNOSIS — I10 BENIGN ESSENTIAL HTN: ICD-10-CM

## 2019-09-25 DIAGNOSIS — K21.9 GASTROESOPHAGEAL REFLUX DISEASE WITHOUT ESOPHAGITIS: ICD-10-CM

## 2019-09-25 DIAGNOSIS — E11.00 UNCONTROLLED TYPE 2 DIABETES MELLITUS WITH HYPEROSMOLARITY WITHOUT COMA, WITH LONG-TERM CURRENT USE OF INSULIN (HCC): ICD-10-CM

## 2019-09-25 DIAGNOSIS — Z79.4 TYPE 2 DIABETES MELLITUS WITH DIABETIC POLYNEUROPATHY, WITH LONG-TERM CURRENT USE OF INSULIN (HCC): ICD-10-CM

## 2019-09-25 DIAGNOSIS — Z79.4 UNCONTROLLED TYPE 2 DIABETES MELLITUS WITH HYPEROSMOLARITY WITHOUT COMA, WITH LONG-TERM CURRENT USE OF INSULIN (HCC): ICD-10-CM

## 2019-09-25 DIAGNOSIS — E11.42 TYPE 2 DIABETES MELLITUS WITH DIABETIC POLYNEUROPATHY, WITH LONG-TERM CURRENT USE OF INSULIN (HCC): ICD-10-CM

## 2019-09-25 RX ORDER — AMLODIPINE BESYLATE 5 MG/1
5 TABLET ORAL DAILY
Qty: 30 TABLET | Refills: 5 | Status: SHIPPED | OUTPATIENT
Start: 2019-09-25 | End: 2020-01-13 | Stop reason: SDUPTHER

## 2019-09-25 RX ORDER — PANTOPRAZOLE SODIUM 40 MG/1
TABLET, DELAYED RELEASE ORAL
Qty: 30 TABLET | Refills: 5 | Status: SHIPPED | OUTPATIENT
Start: 2019-09-25 | End: 2020-01-13 | Stop reason: SDUPTHER

## 2019-09-25 RX ORDER — CILOSTAZOL 100 MG/1
TABLET ORAL
Qty: 60 TABLET | Refills: 5 | Status: SHIPPED | OUTPATIENT
Start: 2019-09-25 | End: 2020-01-13 | Stop reason: SDUPTHER

## 2019-09-25 NOTE — TELEPHONE ENCOUNTER
Health Maintenance   Topic Date Due    HIV screen  04/15/1986    Hepatitis B Vaccine (1 of 3 - Risk 3-dose series) 04/15/1990    Diabetic foot exam  10/08/2016    Diabetic retinal exam  10/08/2016    Annual Wellness Visit (AWV)  07/26/2019    Flu vaccine (1) 09/01/2019    Diabetic microalbuminuria test  10/08/2019    A1C test (Diabetic or Prediabetic)  01/14/2020    Lipid screen  01/14/2020    Potassium monitoring  01/14/2020    Creatinine monitoring  01/14/2020    DTaP/Tdap/Td vaccine (2 - Td) 11/19/2022    Pneumococcal 0-64 years Vaccine  Completed             (applicable per patient's age: Cancer Screenings, Depression Screening, Fall Risk Screening, Immunizations)    Hemoglobin A1C (%)   Date Value   01/14/2019 8.7 (H)   10/08/2018 8.9 (H)   07/24/2018 10.0     Microalb/Crt.  Ratio (mcg/mg creat)   Date Value   10/08/2018 60 (H)     LDL Cholesterol (mg/dL)   Date Value   01/14/2019 139 (H)     LDL Calculated (mg/dL)   Date Value   03/28/2018 91     AST (U/L)   Date Value   01/14/2019 23     ALT (U/L)   Date Value   01/14/2019 50 (H)     BUN (mg/dL)   Date Value   01/14/2019 19      (goal A1C is < 7)   (goal LDL is <100) need 30-50% reduction from baseline     BP Readings from Last 3 Encounters:   07/26/19 130/86   01/17/19 122/83   10/09/18 139/85    (goal /80)      All Future Testing planned in [facility]:  Lab Frequency Next Occurrence   Comprehensive Metabolic Panel Once 58/04/9701   Hemoglobin A1C Once 09/30/2019   Lipid Panel Once 09/30/2019   Vitamin D 25 Hydroxy Once 09/30/2019       Next Visit Date:  Future Appointments   Date Time Provider Dwaine Pal   9/27/2019  2:15 PM MD Addison Coronado [facility] [address] [facility]            Patient Active Problem List:     GERD (gastroesophageal reflux disease)     Carpal tunnel syndrome of right wrist     Benign essential HTN     Diabetic peripheral neuropathy (HCC)     Mixed hyperlipidemia     Vitamin D deficiency     Leg pain, left Microalbuminuria     P[facility]ipheral vascular disease) [facility]

## 2019-10-10 DIAGNOSIS — E11.00 UNCONTROLLED TYPE 2 DIABETES MELLITUS WITH HYPEROSMOLARITY WITHOUT COMA, WITH LONG-TERM CURRENT USE OF INSULIN (HCC): ICD-10-CM

## 2019-10-10 DIAGNOSIS — Z79.4 UNCONTROLLED TYPE 2 DIABETES MELLITUS WITH HYPEROSMOLARITY WITHOUT COMA, WITH LONG-TERM CURRENT USE OF INSULIN (HCC): ICD-10-CM

## 2019-10-10 RX ORDER — LANCETS 28 GAUGE
EACH MISCELLANEOUS
Qty: 100 EACH | Refills: 5 | Status: SHIPPED | OUTPATIENT
Start: 2019-10-10 | End: 2020-05-08

## 2019-10-10 RX ORDER — GLUCOSAMINE HCL/CHONDROITIN SU 500-400 MG
1 CAPSULE ORAL 3 TIMES DAILY PRN
Qty: 100 STRIP | Refills: 5 | Status: SHIPPED | OUTPATIENT
Start: 2019-10-10 | End: 2020-05-08

## 2019-10-15 ENCOUNTER — OFFICE VISIT (OUTPATIENT)
Dept: FAMILY MEDICINE CLINIC | Age: 49
End: 2019-10-15
Payer: MEDICARE

## 2019-10-15 ENCOUNTER — HOSPITAL ENCOUNTER (OUTPATIENT)
Age: 49
Setting detail: SPECIMEN
Discharge: HOME | End: 2019-10-15
Payer: MEDICARE

## 2019-10-15 VITALS
SYSTOLIC BLOOD PRESSURE: 136 MMHG | HEART RATE: 91 BPM | BODY MASS INDEX: 22.51 KG/M2 | WEIGHT: 152 LBS | DIASTOLIC BLOOD PRESSURE: 91 MMHG | HEIGHT: 69 IN

## 2019-10-15 DIAGNOSIS — Z23 NEED FOR INFLUENZA VACCINATION: ICD-10-CM

## 2019-10-15 DIAGNOSIS — I10 BENIGN ESSENTIAL HTN: ICD-10-CM

## 2019-10-15 DIAGNOSIS — R19.7 DIARRHEA, UNSPECIFIED TYPE: ICD-10-CM

## 2019-10-15 DIAGNOSIS — Z90.49 S/P COLON RESECTION: ICD-10-CM

## 2019-10-15 DIAGNOSIS — F41.8 DEPRESSION WITH ANXIETY: ICD-10-CM

## 2019-10-15 DIAGNOSIS — R11.2 NON-INTRACTABLE VOMITING WITH NAUSEA, UNSPECIFIED VOMITING TYPE: Primary | ICD-10-CM

## 2019-10-15 LAB
C DIFF AG + TOXIN: NEGATIVE
SPECIMEN DESCRIPTION: NORMAL

## 2019-10-15 PROCEDURE — 87449 NOS EACH ORGANISM AG IA: CPT

## 2019-10-15 PROCEDURE — G8482 FLU IMMUNIZE ORDER/ADMIN: HCPCS | Performed by: INTERNAL MEDICINE

## 2019-10-15 PROCEDURE — G0008 ADMIN INFLUENZA VIRUS VAC: HCPCS | Performed by: INTERNAL MEDICINE

## 2019-10-15 PROCEDURE — 90686 IIV4 VACC NO PRSV 0.5 ML IM: CPT | Performed by: INTERNAL MEDICINE

## 2019-10-15 PROCEDURE — G8427 DOCREV CUR MEDS BY ELIG CLIN: HCPCS | Performed by: INTERNAL MEDICINE

## 2019-10-15 PROCEDURE — 87324 CLOSTRIDIUM AG IA: CPT

## 2019-10-15 PROCEDURE — G8420 CALC BMI NORM PARAMETERS: HCPCS | Performed by: INTERNAL MEDICINE

## 2019-10-15 PROCEDURE — 4004F PT TOBACCO SCREEN RCVD TLK: CPT | Performed by: INTERNAL MEDICINE

## 2019-10-15 PROCEDURE — 99214 OFFICE O/P EST MOD 30 MIN: CPT | Performed by: INTERNAL MEDICINE

## 2019-10-15 RX ORDER — BUSPIRONE HYDROCHLORIDE 10 MG/1
10 TABLET ORAL 3 TIMES DAILY
Qty: 90 TABLET | Refills: 0 | Status: SHIPPED | OUTPATIENT
Start: 2019-10-15 | End: 2019-11-01 | Stop reason: DRUGHIGH

## 2019-10-15 RX ORDER — ESCITALOPRAM OXALATE 20 MG/1
10 TABLET ORAL DAILY
COMMUNITY
Start: 2019-10-18 | End: 2019-10-25

## 2019-10-15 ASSESSMENT — ENCOUNTER SYMPTOMS
VOMITING: 1
NAUSEA: 1
BLOOD IN STOOL: 0
DIARRHEA: 0
ABDOMINAL PAIN: 0
SORE THROAT: 0
RESPIRATORY NEGATIVE: 1
CONSTIPATION: 0

## 2019-10-15 ASSESSMENT — PATIENT HEALTH QUESTIONNAIRE - PHQ9
1. LITTLE INTEREST OR PLEASURE IN DOING THINGS: 1
SUM OF ALL RESPONSES TO PHQ QUESTIONS 1-9: 2
SUM OF ALL RESPONSES TO PHQ9 QUESTIONS 1 & 2: 2
SUM OF ALL RESPONSES TO PHQ QUESTIONS 1-9: 2
2. FEELING DOWN, DEPRESSED OR HOPELESS: 1

## 2019-10-18 ENCOUNTER — VIRTUAL VISIT (OUTPATIENT)
Dept: PSYCHIATRY | Age: 49
End: 2019-10-18
Payer: MEDICARE

## 2019-10-18 VITALS
HEIGHT: 69 IN | BODY MASS INDEX: 22.51 KG/M2 | SYSTOLIC BLOOD PRESSURE: 138 MMHG | WEIGHT: 152 LBS | DIASTOLIC BLOOD PRESSURE: 89 MMHG

## 2019-10-18 DIAGNOSIS — F32.A DEPRESSIVE DISORDER: ICD-10-CM

## 2019-10-18 DIAGNOSIS — F41.9 ANXIETY: ICD-10-CM

## 2019-10-18 DIAGNOSIS — F43.0 ACUTE STRESS DISORDER: Primary | ICD-10-CM

## 2019-10-18 PROCEDURE — 99204 OFFICE O/P NEW MOD 45 MIN: CPT | Performed by: NURSE PRACTITIONER

## 2019-10-18 RX ORDER — PRAZOSIN HYDROCHLORIDE 1 MG/1
1 CAPSULE ORAL NIGHTLY
Qty: 30 CAPSULE | Refills: 0 | Status: SHIPPED | OUTPATIENT
Start: 2019-10-18 | End: 2019-11-01

## 2019-10-18 RX ORDER — HYDROXYZINE HYDROCHLORIDE 25 MG/1
25 TABLET, FILM COATED ORAL 3 TIMES DAILY PRN
Qty: 90 TABLET | Refills: 0 | Status: SHIPPED | OUTPATIENT
Start: 2019-10-18 | End: 2019-11-01

## 2019-10-18 RX ORDER — VENLAFAXINE HYDROCHLORIDE 37.5 MG/1
CAPSULE, EXTENDED RELEASE ORAL
Qty: 46 CAPSULE | Refills: 0 | Status: SHIPPED | OUTPATIENT
Start: 2019-10-18 | End: 2019-11-01 | Stop reason: DRUGHIGH

## 2019-10-21 ENCOUNTER — TELEPHONE (OUTPATIENT)
Dept: FAMILY MEDICINE CLINIC | Age: 49
End: 2019-10-21

## 2019-10-21 DIAGNOSIS — R19.7 DIARRHEA, UNSPECIFIED TYPE: Primary | ICD-10-CM

## 2019-10-21 DIAGNOSIS — K91.2 SHORT BOWEL SYNDROME: ICD-10-CM

## 2019-10-22 ENCOUNTER — TELEPHONE (OUTPATIENT)
Dept: FAMILY MEDICINE CLINIC | Age: 49
End: 2019-10-22

## 2019-10-22 DIAGNOSIS — R19.7 DIARRHEA, UNSPECIFIED TYPE: ICD-10-CM

## 2019-10-22 DIAGNOSIS — R10.84 GENERALIZED ABDOMINAL PAIN: Primary | ICD-10-CM

## 2019-10-23 DIAGNOSIS — R19.7 DIARRHEA, UNSPECIFIED TYPE: ICD-10-CM

## 2019-10-29 ENCOUNTER — TELEPHONE (OUTPATIENT)
Dept: FAMILY MEDICINE CLINIC | Age: 49
End: 2019-10-29

## 2019-10-29 DIAGNOSIS — R19.7 DIARRHEA, UNSPECIFIED TYPE: Primary | ICD-10-CM

## 2019-10-29 RX ORDER — DICYCLOMINE HYDROCHLORIDE 10 MG/1
10 CAPSULE ORAL
Qty: 120 CAPSULE | Refills: 3 | Status: SHIPPED | OUTPATIENT
Start: 2019-10-29 | End: 2020-01-13 | Stop reason: SDUPTHER

## 2019-11-01 ENCOUNTER — VIRTUAL VISIT (OUTPATIENT)
Dept: PSYCHIATRY | Age: 49
End: 2019-11-01
Payer: MEDICARE

## 2019-11-01 VITALS
DIASTOLIC BLOOD PRESSURE: 77 MMHG | WEIGHT: 157 LBS | HEIGHT: 69 IN | BODY MASS INDEX: 23.25 KG/M2 | HEART RATE: 89 BPM | SYSTOLIC BLOOD PRESSURE: 138 MMHG

## 2019-11-01 DIAGNOSIS — F32.A DEPRESSIVE DISORDER: Primary | ICD-10-CM

## 2019-11-01 DIAGNOSIS — F41.9 ANXIETY: ICD-10-CM

## 2019-11-01 DIAGNOSIS — F43.0 ACUTE STRESS DISORDER: ICD-10-CM

## 2019-11-01 PROCEDURE — 99212 OFFICE O/P EST SF 10 MIN: CPT | Performed by: NURSE PRACTITIONER

## 2019-11-01 RX ORDER — VENLAFAXINE HYDROCHLORIDE 75 MG/1
75 CAPSULE, EXTENDED RELEASE ORAL DAILY
Qty: 30 CAPSULE | Refills: 1 | Status: SHIPPED | OUTPATIENT
Start: 2019-11-01 | End: 2020-01-13 | Stop reason: SDUPTHER

## 2019-11-01 RX ORDER — HYDROXYZINE HYDROCHLORIDE 25 MG/1
25 TABLET, FILM COATED ORAL 3 TIMES DAILY PRN
Qty: 90 TABLET | Refills: 1 | Status: SHIPPED | OUTPATIENT
Start: 2019-11-01 | End: 2020-01-27

## 2019-11-01 RX ORDER — PRAZOSIN HYDROCHLORIDE 1 MG/1
1 CAPSULE ORAL NIGHTLY
Qty: 30 CAPSULE | Refills: 1 | Status: SHIPPED | OUTPATIENT
Start: 2019-11-01 | End: 2020-01-27

## 2019-11-01 RX ORDER — BUSPIRONE HYDROCHLORIDE 10 MG/1
10 TABLET ORAL 2 TIMES DAILY
Qty: 60 TABLET | Refills: 1 | Status: SHIPPED | OUTPATIENT
Start: 2019-11-01 | End: 2020-01-27

## 2019-11-06 ENCOUNTER — TELEPHONE (OUTPATIENT)
Dept: FAMILY MEDICINE CLINIC | Age: 49
End: 2019-11-06

## 2020-01-13 RX ORDER — VENLAFAXINE HYDROCHLORIDE 75 MG/1
75 CAPSULE, EXTENDED RELEASE ORAL DAILY
Qty: 30 CAPSULE | Refills: 2 | Status: SHIPPED | OUTPATIENT
Start: 2020-01-13 | End: 2020-05-11

## 2020-01-13 RX ORDER — CILOSTAZOL 100 MG/1
TABLET ORAL
Qty: 60 TABLET | Refills: 2 | Status: SHIPPED | OUTPATIENT
Start: 2020-01-13 | End: 2020-05-11 | Stop reason: SDUPTHER

## 2020-01-13 RX ORDER — METOPROLOL TARTRATE 50 MG/1
TABLET, FILM COATED ORAL
Qty: 60 TABLET | Refills: 2 | Status: SHIPPED | OUTPATIENT
Start: 2020-01-13 | End: 2020-08-05

## 2020-01-13 RX ORDER — PANTOPRAZOLE SODIUM 40 MG/1
TABLET, DELAYED RELEASE ORAL
Qty: 30 TABLET | Refills: 2 | Status: SHIPPED | OUTPATIENT
Start: 2020-01-13 | End: 2020-05-11

## 2020-01-13 RX ORDER — DICYCLOMINE HYDROCHLORIDE 10 MG/1
10 CAPSULE ORAL
Qty: 120 CAPSULE | Refills: 2 | Status: SHIPPED | OUTPATIENT
Start: 2020-01-13 | End: 2020-05-08

## 2020-01-13 RX ORDER — AMLODIPINE BESYLATE 5 MG/1
5 TABLET ORAL DAILY
Qty: 30 TABLET | Refills: 2 | Status: SHIPPED | OUTPATIENT
Start: 2020-01-13 | End: 2020-07-06

## 2020-01-13 NOTE — TELEPHONE ENCOUNTER
Pt was present for an appt today but chose not to be seen due to [facility] not being in network for his insurance [employer]. Pt will see if he can change insurance and if not he will need to change providers. He ask for refills until he figures it out. Health Maintenance   Topic Date Due    HIV screen  04/15/1986    Hepatitis B vaccine (1 of 3 - Risk 3-dose series) 04/15/1990    Diabetic foot exam  10/08/2016    Diabetic retinal exam  10/08/2016    Annual Wellness Visit (AWV)  07/26/2019    Diabetic microalbuminuria test  10/08/2019    A1C test (Diabetic or Prediabetic)  01/14/2020    Lipid screen  01/14/2020    Potassium monitoring  01/14/2020    Creatinine monitoring  01/14/2020    DTaP/Tdap/Td vaccine (2 - Td) 11/19/2022    Flu vaccine  Completed    Pneumococcal 0-64 years Vaccine  Completed             (applicable per patient's age: Cancer Screenings, Depression Screening, Fall Risk Screening, Immunizations)    Hemoglobin A1C (%)   Date Value   01/14/2019 8.7 (H)   10/08/2018 8.9 (H)   07/24/2018 10.0     Microalb/Crt. Ratio (mcg/mg creat)   Date Value   10/08/2018 60 (H)     LDL Cholesterol (mg/dL)   Date Value   01/14/2019 139 (H)     LDL Calculated (mg/dL)   Date Value   03/28/2018 91     AST (U/L)   Date Value   01/14/2019 23     ALT (U/L)   Date Value   01/14/2019 50 (H)     BUN (mg/dL)   Date Value   01/14/2019 19      (goal A1C is < 7)   (goal LDL is <100) need 30-50% reduction from baseline     BP Readings from Last 3 Encounters:   11/01/19 138/77   10/18/19 138/89   10/15/19 (!) 136/91    (goal /80)      All Future Testing planned in [facility]:      Next Visit Date:  No future appointments.          Patient Active Problem List:     GERD (gastroesophageal reflux disease)     Carpal tunnel syndrome of right wrist     Benign essential HTN     Diabetic peripheral neuropathy (HCC)     Mixed hyperlipidemia     Vitamin D deficiency     Leg pain, left     Microalbuminuria     [facility]

## 2020-01-27 RX ORDER — HYDROXYZINE HYDROCHLORIDE 25 MG/1
TABLET, FILM COATED ORAL
Qty: 90 TABLET | Refills: 1 | Status: SHIPPED | OUTPATIENT
Start: 2020-01-27 | End: 2020-04-27

## 2020-01-27 RX ORDER — BUSPIRONE HYDROCHLORIDE 10 MG/1
TABLET ORAL
Qty: 60 TABLET | Refills: 1 | Status: SHIPPED | OUTPATIENT
Start: 2020-01-27 | End: 2020-04-01

## 2020-01-27 RX ORDER — PRAZOSIN HYDROCHLORIDE 1 MG/1
1 CAPSULE ORAL NIGHTLY
Qty: 30 CAPSULE | Refills: 1 | Status: SHIPPED | OUTPATIENT
Start: 2020-01-27 | End: 2020-04-15 | Stop reason: SDUPTHER

## 2020-04-01 RX ORDER — BUSPIRONE HYDROCHLORIDE 10 MG/1
TABLET ORAL
Qty: 60 TABLET | Refills: 1 | Status: SHIPPED | OUTPATIENT
Start: 2020-04-01

## 2020-04-15 RX ORDER — PRAZOSIN HYDROCHLORIDE 1 MG/1
CAPSULE ORAL
Qty: 30 CAPSULE | Refills: 3 | Status: SHIPPED | OUTPATIENT
Start: 2020-04-15 | End: 2020-10-09 | Stop reason: SDUPTHER

## 2020-04-27 RX ORDER — HYDROXYZINE HYDROCHLORIDE 25 MG/1
TABLET, FILM COATED ORAL
Qty: 90 TABLET | Refills: 1 | Status: SHIPPED | OUTPATIENT
Start: 2020-04-27 | End: 2020-10-09 | Stop reason: SDUPTHER

## 2020-05-08 ENCOUNTER — OFFICE VISIT (OUTPATIENT)
Dept: FAMILY MEDICINE CLINIC | Age: 50
End: 2020-05-08
Payer: MEDICARE

## 2020-05-08 VITALS
SYSTOLIC BLOOD PRESSURE: 130 MMHG | WEIGHT: 188 LBS | TEMPERATURE: 95.7 F | HEIGHT: 69 IN | BODY MASS INDEX: 27.85 KG/M2 | HEART RATE: 90 BPM | DIASTOLIC BLOOD PRESSURE: 80 MMHG

## 2020-05-08 PROBLEM — I65.22 LEFT CAROTID ARTERY STENOSIS: Status: ACTIVE | Noted: 2020-05-08

## 2020-05-08 PROBLEM — F43.23 SITUATIONAL MIXED ANXIETY AND DEPRESSIVE DISORDER: Status: ACTIVE | Noted: 2019-10-16

## 2020-05-08 PROBLEM — F17.210 CIGARETTE SMOKER: Status: ACTIVE | Noted: 2019-08-21

## 2020-05-08 PROBLEM — M79.603 PAIN IN UPPER LIMB: Status: ACTIVE | Noted: 2020-05-08

## 2020-05-08 PROBLEM — Z90.49 STATUS POST PARTIAL RESECTION OF COLON: Status: ACTIVE | Noted: 2020-05-08

## 2020-05-08 LAB
ALBUMIN SERPL-MCNC: NORMAL G/DL
ALP BLD-CCNC: NORMAL U/L
ALT SERPL-CCNC: NORMAL U/L
ANION GAP SERPL CALCULATED.3IONS-SCNC: NORMAL MMOL/L
AST SERPL-CCNC: NORMAL U/L
BILIRUB SERPL-MCNC: NORMAL MG/DL
BUN BLDV-MCNC: NORMAL MG/DL
CALCIUM SERPL-MCNC: NORMAL MG/DL
CHLORIDE BLD-SCNC: NORMAL MMOL/L
CHOLESTEROL, TOTAL: 180 MG/DL
CHOLESTEROL/HDL RATIO: ABNORMAL
CO2: NORMAL
CREAT SERPL-MCNC: 1.03 MG/DL
GFR CALCULATED: NORMAL
GLUCOSE BLD-MCNC: NORMAL MG/DL
HDLC SERPL-MCNC: 33 MG/DL (ref 35–70)
LDL CHOLESTEROL CALCULATED: 93 MG/DL (ref 0–160)
POTASSIUM SERPL-SCNC: 4.4 MMOL/L
SODIUM BLD-SCNC: NORMAL MMOL/L
TOTAL PROTEIN: NORMAL
TRIGL SERPL-MCNC: 269 MG/DL
VLDLC SERPL CALC-MCNC: 54 MG/DL

## 2020-05-08 PROCEDURE — 99214 OFFICE O/P EST MOD 30 MIN: CPT | Performed by: INTERNAL MEDICINE

## 2020-05-08 RX ORDER — HYOSCYAMINE SULFATE 0.125 MG
125 TABLET ORAL EVERY 4 HOURS PRN
COMMUNITY

## 2020-05-08 RX ORDER — METOCLOPRAMIDE 10 MG/1
10 TABLET ORAL
COMMUNITY
Start: 2019-10-15

## 2020-05-08 RX ORDER — LOPERAMIDE HYDROCHLORIDE 2 MG/1
2 CAPSULE ORAL 4 TIMES DAILY PRN
COMMUNITY

## 2020-05-08 SDOH — ECONOMIC STABILITY: INCOME INSECURITY: HOW HARD IS IT FOR YOU TO PAY FOR THE VERY BASICS LIKE FOOD, HOUSING, MEDICAL CARE, AND HEATING?: NOT VERY HARD

## 2020-05-08 SDOH — ECONOMIC STABILITY: TRANSPORTATION INSECURITY
IN THE PAST 12 MONTHS, HAS THE LACK OF TRANSPORTATION KEPT YOU FROM MEDICAL APPOINTMENTS OR FROM GETTING MEDICATIONS?: NO

## 2020-05-08 SDOH — ECONOMIC STABILITY: FOOD INSECURITY: WITHIN THE PAST 12 MONTHS, THE FOOD YOU BOUGHT JUST DIDN'T LAST AND YOU DIDN'T HAVE MONEY TO GET MORE.: NEVER TRUE

## 2020-05-08 SDOH — ECONOMIC STABILITY: TRANSPORTATION INSECURITY
IN THE PAST 12 MONTHS, HAS LACK OF TRANSPORTATION KEPT YOU FROM MEETINGS, WORK, OR FROM GETTING THINGS NEEDED FOR DAILY LIVING?: NO

## 2020-05-08 SDOH — ECONOMIC STABILITY: FOOD INSECURITY: WITHIN THE PAST 12 MONTHS, YOU WORRIED THAT YOUR FOOD WOULD RUN OUT BEFORE YOU GOT MONEY TO BUY MORE.: NEVER TRUE

## 2020-05-08 ASSESSMENT — PATIENT HEALTH QUESTIONNAIRE - PHQ9
2. FEELING DOWN, DEPRESSED OR HOPELESS: 0
1. LITTLE INTEREST OR PLEASURE IN DOING THINGS: 0
SUM OF ALL RESPONSES TO PHQ9 QUESTIONS 1 & 2: 0
SUM OF ALL RESPONSES TO PHQ QUESTIONS 1-9: 0
SUM OF ALL RESPONSES TO PHQ QUESTIONS 1-9: 0

## 2020-05-08 ASSESSMENT — ENCOUNTER SYMPTOMS
BLOOD IN STOOL: 0
ABDOMINAL PAIN: 1
CONSTIPATION: 0
RESPIRATORY NEGATIVE: 1
SORE THROAT: 0
DIARRHEA: 0
NAUSEA: 0

## 2020-05-08 NOTE — PROGRESS NOTES
Subjective:      Patient ID: Mike Murphy is a 53 y.o. male. Tylor Diamond presents for a check up on his medical conditions DM II, HTN, GERD, Depression. Preston admits to new problems. Medications were reviewed with Tylor President, he is  tolerating the medication. Bowels are not regular, takes imodium as needed for diarrhea. There has not been rectal bleeding. Tylor President denies urinary complications, the urine stream is good. Preston denies chest pain and denies increasing shortness of breath. DM II - AM  to 200 range. Jaimee states 3 weeks after his abdominal surgery he noticed a lump right of the midline of his abdomen. The area does hurt on occasion. The area can be pushed in but pops out. When he is physically doing something he has the pain. He states he stays active so this causes him pain often. Elzbietad President states he has not had any follow up with his surgeon (Dr. Silvia Gallego). Elzbietadelaney President states he recently seen his vascular surgeon and was told that everything was doing well. Tylor President is following with Nephrology. Depression / anxiety has been well controlled on Effexor.       Past Medical History:  No date: GERD (gastroesophageal reflux disease)  No date: Hyperlipidemia  No date: Hypertension  No date: Microalbuminuria  No date: PAD (peripheral artery disease) ([facility]    Past Surgical History:  No date: APPENDECTOMY  8/2012: ARTERIAL BYPASS SURGRY  2006: CARDIAC SURGERY  08/2019: COLECTOMY      Comment:  sigmoid resection ([facility])  08/2019: SMALL INTESTINE SURGERY      Comment:  small bowel resection ([facility])    Social History    Socioeconomic History      Marital status:       Spouse name: Not on file      Number of children: Not on file      Years of education: Not on file      Highest education level: Not on file    Occupational History      Occupation: disability    Social Needs      Financial resource strain: Not very hard      Food insecurity        Worry: Never true        Inability: Effort: Pulmonary effort is normal.      Breath sounds: Normal breath sounds. Abdominal:      Palpations: Abdomen is soft. Tenderness: There is no abdominal tenderness. Comments: Reducible hernia just right of midline with no pain with palpation (3 cm in size). Lymphadenopathy:      Cervical: No cervical adenopathy. Skin:     Findings: No rash. Neurological:      Mental Status: He is alert. Psychiatric:         Behavior: Behavior normal.         Thought Content: Thought content normal.         Assessment:       Diagnosis Orders   1. Incisional hernia, without obstruction or gangrene  External Referral To General Surgery   2. Controlled type 2 diabetes mellitus without complication, with long-term current use of insulin ([facility])  Comprehensive Metabolic Panel    Hemoglobin A1C   3. Benign essential HTN  Comprehensive Metabolic Panel   4. Vitamin D deficiency  Vitamin D 25 Hydroxy   5. Mixed hyperlipidemia  Comprehensive Metabolic Panel    Lipid Panel   6. Gastroesophageal reflux disease without esophagitis     7. PVD (peripheral vascular disease) ([facility]     8. Depression with anxiety             Plan:      1. Incisional hernia, without obstruction or gangrene  Refer to General Surgery to evaluate for surgical correction.    - External Referral To General Surgery    2. Controlled type 2 diabetes mellitus without complication, with long-term current use of insulin ([facility])  HgbA1C today to evaluate control of diabetes. - Comprehensive Metabolic Panel; Future  - Hemoglobin A1C; Future    3. Benign essential HTN  Controlled on the current medication.  - Comprehensive Metabolic Panel; Future    4. Vitamin D deficiency  Vitamin D levels today. - Vitamin D 25 Hydroxy; Future    5. Mixed hyperlipidemia  Lipid profile today to evaluate control.  - Comprehensive Metabolic Panel; Future  - Lipid Panel; Future    6. Gastroesophageal reflux disease without esophagitis  Controlled on Protonix.     7. PVD

## 2020-05-11 LAB
AVERAGE GLUCOSE: NORMAL
HBA1C MFR BLD: 8.2 %

## 2020-05-11 RX ORDER — VENLAFAXINE HYDROCHLORIDE 75 MG/1
CAPSULE, EXTENDED RELEASE ORAL
Qty: 30 CAPSULE | Refills: 2 | Status: SHIPPED | OUTPATIENT
Start: 2020-05-11 | End: 2020-09-08

## 2020-05-11 RX ORDER — PANTOPRAZOLE SODIUM 40 MG/1
TABLET, DELAYED RELEASE ORAL
Qty: 30 TABLET | Refills: 2 | Status: SHIPPED | OUTPATIENT
Start: 2020-05-11 | End: 2020-07-06

## 2020-05-11 RX ORDER — CILOSTAZOL 100 MG/1
TABLET ORAL
Qty: 60 TABLET | Refills: 2 | Status: SHIPPED | OUTPATIENT
Start: 2020-05-11 | End: 2021-01-18

## 2020-05-11 NOTE — TELEPHONE ENCOUNTER
Health Maintenance   Topic Date Due    HIV screen  04/15/1986    Hepatitis B vaccine (1 of 3 - Risk 3-dose series) 04/15/1990    Diabetic foot exam  10/08/2016    Diabetic retinal exam  10/08/2016    Annual Wellness Visit (AWV)  07/26/2019    Diabetic microalbuminuria test  10/08/2019    A1C test (Diabetic or Prediabetic)  01/14/2020    Lipid screen  01/14/2020    Potassium monitoring  01/14/2020    Creatinine monitoring  01/14/2020    DTaP/Tdap/Td vaccine (2 - Td) 11/19/2022    Flu vaccine  Completed    Pneumococcal 0-64 years Vaccine  Completed    Hepatitis A vaccine  Aged Out    Hib vaccine  Aged Out    Meningococcal (ACWY) vaccine  Aged Out             (applicable per patient's age: Cancer Screenings, Depression Screening, Fall Risk Screening, Immunizations)    Hemoglobin A1C (%)   Date Value   01/14/2019 8.7 (H)   10/08/2018 8.9 (H)   07/24/2018 10.0     Microalb/Crt.  Ratio (mcg/mg creat)   Date Value   10/08/2018 60 (H)     LDL Cholesterol (mg/dL)   Date Value   01/14/2019 139 (H)     LDL Calculated (mg/dL)   Date Value   03/28/2018 91     AST (U/L)   Date Value   01/14/2019 23     ALT (U/L)   Date Value   01/14/2019 50 (H)     BUN (mg/dL)   Date Value   01/14/2019 19      (goal A1C is < 7)   (goal LDL is <100) need 30-50% reduction from baseline     BP Readings from Last 3 Encounters:   05/08/20 130/80   11/01/19 138/77   10/18/19 138/89    (goal /80)      All Future Testing planned in [facility]:  Lab Frequency Next Occurrence   Comprehensive Metabolic Panel Once 10/50/8676   Hemoglobin A1C Once 05/08/2020   Lipid Panel Once 05/08/2020   Vitamin D 25 Hydroxy Once 05/08/2020       Next Visit Date:  Future Appointments   Date Time Provider Dwaine Pal   8/11/2020 10:00 AM Stu Sierra MD [facility] [address] [facility]            Patient Active Problem List:     GERD (gastroesophageal reflux disease)     Carpal tunnel syndrome of right wrist     Benign essential HTN     Diabetic peripheral

## 2020-05-11 NOTE — TELEPHONE ENCOUNTER
Health Maintenance   Topic Date Due    HIV screen  04/15/1986    Hepatitis B vaccine (1 of 3 - Risk 3-dose series) 04/15/1990    Diabetic foot exam  10/08/2016    Diabetic retinal exam  10/08/2016    Annual Wellness Visit (AWV)  07/26/2019    Diabetic microalbuminuria test  10/08/2019    A1C test (Diabetic or Prediabetic)  01/14/2020    Lipid screen  01/14/2020    Potassium monitoring  01/14/2020    Creatinine monitoring  01/14/2020    DTaP/Tdap/Td vaccine (2 - Td) 11/19/2022    Flu vaccine  Completed    Pneumococcal 0-64 years Vaccine  Completed    Hepatitis A vaccine  Aged Out    Hib vaccine  Aged Out    Meningococcal (ACWY) vaccine  Aged Out             (applicable per patient's age: Cancer Screenings, Depression Screening, Fall Risk Screening, Immunizations)    Hemoglobin A1C (%)   Date Value   01/14/2019 8.7 (H)   10/08/2018 8.9 (H)   07/24/2018 10.0     Microalb/Crt.  Ratio (mcg/mg creat)   Date Value   10/08/2018 60 (H)     LDL Cholesterol (mg/dL)   Date Value   01/14/2019 139 (H)     LDL Calculated (mg/dL)   Date Value   03/28/2018 91     AST (U/L)   Date Value   01/14/2019 23     ALT (U/L)   Date Value   01/14/2019 50 (H)     BUN (mg/dL)   Date Value   01/14/2019 19      (goal A1C is < 7)   (goal LDL is <100) need 30-50% reduction from baseline     BP Readings from Last 3 Encounters:   05/08/20 130/80   11/01/19 138/77   10/18/19 138/89    (goal /80)      All Future Testing planned in [facility]:  Lab Frequency Next Occurrence   Comprehensive Metabolic Panel Once 88/78/5766   Hemoglobin A1C Once 05/08/2020   Lipid Panel Once 05/08/2020   Vitamin D 25 Hydroxy Once 05/08/2020       Next Visit Date:  Future Appointments   Date Time Provider Dwaine Pal   8/11/2020 10:00 AM Parish Marquez MD [facility] [address] [facility]            Patient Active Problem List:     GERD (gastroesophageal reflux disease)     Carpal tunnel syndrome of right wrist     Benign essential HTN     Diabetic peripheral

## 2020-05-26 ENCOUNTER — TELEPHONE (OUTPATIENT)
Dept: FAMILY MEDICINE CLINIC | Age: 50
End: 2020-05-26

## 2020-05-26 NOTE — TELEPHONE ENCOUNTER
Pt reports home BS readings:    169  282  204  248  169  161  177  135    Pt reports using 80 units of levemir when BS are \"high\"

## 2020-06-05 ENCOUNTER — TELEPHONE (OUTPATIENT)
Dept: FAMILY MEDICINE CLINIC | Age: 50
End: 2020-06-05

## 2020-07-06 RX ORDER — PANTOPRAZOLE SODIUM 40 MG/1
TABLET, DELAYED RELEASE ORAL
Qty: 30 TABLET | Refills: 2 | Status: SHIPPED | OUTPATIENT
Start: 2020-07-06 | End: 2020-09-01

## 2020-07-06 RX ORDER — AMLODIPINE BESYLATE 5 MG/1
TABLET ORAL
Qty: 30 TABLET | Refills: 2 | Status: SHIPPED | OUTPATIENT
Start: 2020-07-06 | End: 2020-09-22

## 2020-07-06 NOTE — TELEPHONE ENCOUNTER
Health Maintenance   Topic Date Due    HIV screen  04/15/1986    Hepatitis B vaccine (1 of 3 - Risk 3-dose series) 04/15/1990    Diabetic foot exam  10/08/2016    Diabetic retinal exam  10/08/2016    Annual Wellness Visit (AWV)  07/26/2019    Diabetic microalbuminuria test  10/08/2019    Flu vaccine (1) 09/01/2020    A1C test (Diabetic or Prediabetic)  05/08/2021    Lipid screen  05/08/2021    Potassium monitoring  05/08/2021    Creatinine monitoring  05/08/2021    DTaP/Tdap/Td vaccine (2 - Td) 11/19/2022    Pneumococcal 0-64 years Vaccine  Completed    Hepatitis A vaccine  Aged Out    Hib vaccine  Aged Out    Meningococcal (ACWY) vaccine  Aged Out             (applicable per patient's age: Cancer Screenings, Depression Screening, Fall Risk Screening, Immunizations)    Hemoglobin A1C (%)   Date Value   05/08/2020 8.2   01/14/2019 8.7 (H)   10/08/2018 8.9 (H)     Microalb/Crt.  Ratio (mcg/mg creat)   Date Value   10/08/2018 60 (H)     LDL Cholesterol (mg/dL)   Date Value   01/14/2019 139 (H)     LDL Calculated (mg/dL)   Date Value   05/08/2020 93     AST (U/L)   Date Value   01/14/2019 23     ALT (U/L)   Date Value   01/14/2019 50 (H)     BUN (mg/dL)   Date Value   01/14/2019 19      (goal A1C is < 7)   (goal LDL is <100) need 30-50% reduction from baseline     BP Readings from Last 3 Encounters:   05/08/20 130/80   11/01/19 138/77   10/18/19 138/89    (goal /80)      All Future Testing planned in [facility]:      Next Visit Date:  Future Appointments   Date Time Provider Dwaine Pal   8/11/2020 10:00 AM Cuca Renner MD [facility] [address] [facility]            Patient Active Problem List:     GERD (gastroesophageal reflux disease)     Carpal tunnel syndrome of right wrist     Benign essential HTN     Diabetic peripheral neuropathy (HCC)     Mixed hyperlipidemia     Vitamin D deficiency     Leg pain, left     Microalbuminuria     PVD (peripheral vascular disease) ([facility] [address]     Status post partial resection of colon     Cigarette smoker     Left carotid artery stenosis     Pain in upper limb     Situational mixed anxiety and depressive disorder

## 2020-08-05 RX ORDER — METOPROLOL TARTRATE 50 MG/1
TABLET, FILM COATED ORAL
Qty: 60 TABLET | Refills: 5 | Status: SHIPPED | OUTPATIENT
Start: 2020-08-05 | End: 2021-02-17

## 2020-08-05 NOTE — TELEPHONE ENCOUNTER
of colon     Cigarette smoker     Left carotid artery stenosis     Pain in upper limb     Situational mixed anxiety and depressive disorder

## 2020-08-13 RX ORDER — GLUCOSAMINE HCL/CHONDROITIN SU 500-400 MG
1 CAPSULE ORAL 3 TIMES DAILY PRN
Qty: 100 STRIP | Refills: 5 | Status: SHIPPED | OUTPATIENT
Start: 2020-08-13

## 2020-08-13 RX ORDER — LANCETS 28 GAUGE
EACH MISCELLANEOUS
Qty: 100 EACH | Refills: 5 | Status: SHIPPED | OUTPATIENT
Start: 2020-08-13

## 2020-08-13 NOTE — TELEPHONE ENCOUNTER
Health Maintenance   Topic Date Due    HIV screen  04/15/1986    Hepatitis B vaccine (1 of 3 - Risk 3-dose series) 04/15/1990    Diabetic foot exam  10/08/2016    Diabetic retinal exam  10/08/2016    Annual Wellness Visit (AWV)  07/26/2019    Diabetic microalbuminuria test  10/08/2019    Flu vaccine (1) 09/01/2020    A1C test (Diabetic or Prediabetic)  05/08/2021    Lipid screen  05/08/2021    Potassium monitoring  05/08/2021    Creatinine monitoring  05/08/2021    DTaP/Tdap/Td vaccine (2 - Td) 11/19/2022    Pneumococcal 0-64 years Vaccine  Completed    Hepatitis A vaccine  Aged Out    Hib vaccine  Aged Out    Meningococcal (ACWY) vaccine  Aged Out             (applicable per patient's age: Cancer Screenings, Depression Screening, Fall Risk Screening, Immunizations)    Hemoglobin A1C (%)   Date Value   05/08/2020 8.2   01/14/2019 8.7 (H)   10/08/2018 8.9 (H)     Microalb/Crt.  Ratio (mcg/mg creat)   Date Value   10/08/2018 60 (H)     LDL Cholesterol (mg/dL)   Date Value   01/14/2019 139 (H)     LDL Calculated (mg/dL)   Date Value   05/08/2020 93     AST (U/L)   Date Value   01/14/2019 23     ALT (U/L)   Date Value   01/14/2019 50 (H)     BUN (mg/dL)   Date Value   01/14/2019 19      (goal A1C is < 7)   (goal LDL is <100) need 30-50% reduction from baseline     BP Readings from Last 3 Encounters:   05/08/20 130/80   11/01/19 138/77   10/18/19 138/89    (goal /80)      All Future Testing planned in [facility]:      Next Visit Date:  Future Appointments   Date Time Provider Dwaine Pal   9/3/2020  9:00 AM Gilson Mckay MD [facility] [address] [facility]            Patient Active Problem List:     GERD (gastroesophageal reflux disease)     Carpal tunnel syndrome of right wrist     Benign essential HTN     Diabetic peripheral neuropathy (HCC)     Mixed hyperlipidemia     Vitamin D deficiency     Leg pain, left     Microalbuminuria     PVD (peripheral vascular disease) ([facility] [address]     Status post partial resection of colon     Cigarette smoker     Left carotid artery stenosis     Pain in upper limb     Situational mixed anxiety and depressive disorder

## 2020-08-18 RX ORDER — LOSARTAN POTASSIUM 100 MG/1
TABLET ORAL
Qty: 30 TABLET | Refills: 5 | Status: SHIPPED | OUTPATIENT
Start: 2020-08-18 | End: 2021-02-17

## 2020-08-18 NOTE — TELEPHONE ENCOUNTER
Health Maintenance   Topic Date Due    HIV screen  04/15/1986    Hepatitis B vaccine (1 of 3 - Risk 3-dose series) 04/15/1990    Diabetic foot exam  10/08/2016    Diabetic retinal exam  10/08/2016    Annual Wellness Visit (AWV)  07/26/2019    Diabetic microalbuminuria test  10/08/2019    Flu vaccine (1) 09/01/2020    A1C test (Diabetic or Prediabetic)  05/08/2021    Lipid screen  05/08/2021    Potassium monitoring  05/08/2021    Creatinine monitoring  05/08/2021    DTaP/Tdap/Td vaccine (2 - Td) 11/19/2022    Pneumococcal 0-64 years Vaccine  Completed    Hepatitis A vaccine  Aged Out    Hib vaccine  Aged Out    Meningococcal (ACWY) vaccine  Aged Out             (applicable per patient's age: Cancer Screenings, Depression Screening, Fall Risk Screening, Immunizations)    Hemoglobin A1C (%)   Date Value   05/08/2020 8.2   01/14/2019 8.7 (H)   10/08/2018 8.9 (H)     Microalb/Crt.  Ratio (mcg/mg creat)   Date Value   10/08/2018 60 (H)     LDL Cholesterol (mg/dL)   Date Value   01/14/2019 139 (H)     LDL Calculated (mg/dL)   Date Value   05/08/2020 93     AST (U/L)   Date Value   01/14/2019 23     ALT (U/L)   Date Value   01/14/2019 50 (H)     BUN (mg/dL)   Date Value   01/14/2019 19      (goal A1C is < 7)   (goal LDL is <100) need 30-50% reduction from baseline     BP Readings from Last 3 Encounters:   05/08/20 130/80   11/01/19 138/77   10/18/19 138/89    (goal /80)      All Future Testing planned in [facility]:      Next Visit Date:  Future Appointments   Date Time Provider Dwaine Pal   9/3/2020  9:00 AM MD Blaine Tatum            Patient Active Problem List:     GERD (gastroesophageal reflux disease)     Carpal tunnel syndrome of right wrist     Benign essential HTN     Diabetic peripheral neuropathy (HCC)     Mixed hyperlipidemia     Vitamin D deficiency     Leg pain, left     Microalbuminuria     PVD (peripheral vascular disease) ([facility]     Status post partial resection of colon     Cigarette smoker     Left carotid artery stenosis     Pain in upper limb     Situational mixed anxiety and depressive disorder

## 2020-09-01 RX ORDER — PANTOPRAZOLE SODIUM 40 MG/1
TABLET, DELAYED RELEASE ORAL
Qty: 30 TABLET | Refills: 5 | Status: SHIPPED | OUTPATIENT
Start: 2020-09-01 | End: 2020-11-12 | Stop reason: SDUPTHER

## 2020-09-01 NOTE — TELEPHONE ENCOUNTER
Health Maintenance   Topic Date Due    HIV screen  04/15/1986    Hepatitis B vaccine (1 of 3 - Risk 3-dose series) 04/15/1990    Diabetic foot exam  10/08/2016    Diabetic retinal exam  10/08/2016    Annual Wellness Visit (AWV)  07/26/2019    Diabetic microalbuminuria test  10/08/2019    Flu vaccine (1) 09/01/2020    A1C test (Diabetic or Prediabetic)  05/08/2021    Lipid screen  05/08/2021    Potassium monitoring  05/08/2021    Creatinine monitoring  05/08/2021    DTaP/Tdap/Td vaccine (2 - Td) 11/19/2022    Pneumococcal 0-64 years Vaccine  Completed    Hepatitis A vaccine  Aged Out    Hib vaccine  Aged Out    Meningococcal (ACWY) vaccine  Aged Out             (applicable per patient's age: Cancer Screenings, Depression Screening, Fall Risk Screening, Immunizations)    Hemoglobin A1C (%)   Date Value   05/08/2020 8.2   01/14/2019 8.7 (H)   10/08/2018 8.9 (H)     Microalb/Crt.  Ratio (mcg/mg creat)   Date Value   10/08/2018 60 (H)     LDL Cholesterol (mg/dL)   Date Value   01/14/2019 139 (H)     LDL Calculated (mg/dL)   Date Value   05/08/2020 93     AST (U/L)   Date Value   01/14/2019 23     ALT (U/L)   Date Value   01/14/2019 50 (H)     BUN (mg/dL)   Date Value   01/14/2019 19      (goal A1C is < 7)   (goal LDL is <100) need 30-50% reduction from baseline     BP Readings from Last 3 Encounters:   05/08/20 130/80   11/01/19 138/77   10/18/19 138/89    (goal /80)      All Future Testing planned in [facility]:  Lab Frequency Next Occurrence   Comprehensive Metabolic Panel Once 09/30/2429   Lipid Panel Once 09/14/2020   Hemoglobin A1C Once 09/14/2020       Next Visit Date:  Future Appointments   Date Time Provider Dwaine Pal   9/3/2020  9:00 AM Walter Fields MD [facility] [address] [facility]            Patient Active Problem List:     GERD (gastroesophageal reflux disease)     Carpal tunnel syndrome of right wrist     Benign essential HTN     Diabetic peripheral neuropathy ([address]     Mixed hyperlipidemia Vitamin D deficiency     Leg pain, left     Microalbuminuria     PVD (peripheral vascular disease) (HCC)     Status post partial resection of colon     Cigarette smoker     Left carotid artery stenosis     Pain in upper limb     Situational mixed anxiety and depressive disorder

## 2020-09-08 RX ORDER — VENLAFAXINE HYDROCHLORIDE 75 MG/1
CAPSULE, EXTENDED RELEASE ORAL
Qty: 30 CAPSULE | Refills: 2 | Status: SHIPPED | OUTPATIENT
Start: 2020-09-08 | End: 2020-11-12

## 2020-09-08 NOTE — TELEPHONE ENCOUNTER
Health Maintenance   Topic Date Due    HIV screen  04/15/1986    Hepatitis B vaccine (1 of 3 - Risk 3-dose series) 04/15/1990    Diabetic foot exam  10/08/2016    Diabetic retinal exam  10/08/2016    Annual Wellness Visit (AWV)  07/26/2019    Diabetic microalbuminuria test  10/08/2019    Flu vaccine (1) 09/01/2020    A1C test (Diabetic or Prediabetic)  05/08/2021    Lipid screen  05/08/2021    Potassium monitoring  05/08/2021    Creatinine monitoring  05/08/2021    DTaP/Tdap/Td vaccine (2 - Td) 11/19/2022    Pneumococcal 0-64 years Vaccine  Completed    Hepatitis A vaccine  Aged Out    Hib vaccine  Aged Out    Meningococcal (ACWY) vaccine  Aged Out             (applicable per patient's age: Cancer Screenings, Depression Screening, Fall Risk Screening, Immunizations)    Hemoglobin A1C (%)   Date Value   05/08/2020 8.2   01/14/2019 8.7 (H)   10/08/2018 8.9 (H)     Microalb/Crt.  Ratio (mcg/mg creat)   Date Value   10/08/2018 60 (H)     LDL Cholesterol (mg/dL)   Date Value   01/14/2019 139 (H)     LDL Calculated (mg/dL)   Date Value   05/08/2020 93     AST (U/L)   Date Value   01/14/2019 23     ALT (U/L)   Date Value   01/14/2019 50 (H)     BUN (mg/dL)   Date Value   01/14/2019 19      (goal A1C is < 7)   (goal LDL is <100) need 30-50% reduction from baseline     BP Readings from Last 3 Encounters:   05/08/20 130/80   11/01/19 138/77   10/18/19 138/89    (goal /80)      All Future Testing planned in [facility]:  Lab Frequency Next Occurrence   Comprehensive Metabolic Panel Once 13/77/3266   Lipid Panel Once 09/14/2020   Hemoglobin A1C Once 09/14/2020       Next Visit Date:  Future Appointments   Date Time Provider Dwaine Pal   10/9/2020  9:30 AM [facility] [address] [facility]            Patient Active Problem List:     GERD (gastroesophageal reflux disease)     Carpal tunnel syndrome of right wrist     Benign essential HTN     Diabetic peripheral neuropathy ([address]     Mixed

## 2020-09-14 RX ORDER — HYDROXYZINE HYDROCHLORIDE 25 MG/1
TABLET, FILM COATED ORAL
Qty: 90 TABLET | Refills: 2 | OUTPATIENT
Start: 2020-09-14

## 2020-09-14 NOTE — TELEPHONE ENCOUNTER
Health Maintenance   Topic Date Due    HIV screen  04/15/1986    Hepatitis B vaccine (1 of 3 - Risk 3-dose series) 04/15/1990    Diabetic foot exam  10/08/2016    Diabetic retinal exam  10/08/2016    Annual Wellness Visit (AWV)  07/26/2019    Diabetic microalbuminuria test  10/08/2019    Flu vaccine (1) 09/01/2020    A1C test (Diabetic or Prediabetic)  05/08/2021    Lipid screen  05/08/2021    Potassium monitoring  05/08/2021    Creatinine monitoring  05/08/2021    DTaP/Tdap/Td vaccine (2 - Td) 11/19/2022    Pneumococcal 0-64 years Vaccine  Completed    Hepatitis A vaccine  Aged Out    Hib vaccine  Aged Out    Meningococcal (ACWY) vaccine  Aged Out             (applicable per patient's age: Cancer Screenings, Depression Screening, Fall Risk Screening, Immunizations)    Hemoglobin A1C (%)   Date Value   05/08/2020 8.2   01/14/2019 8.7 (H)   10/08/2018 8.9 (H)     Microalb/Crt.  Ratio (mcg/mg creat)   Date Value   10/08/2018 60 (H)     LDL Cholesterol (mg/dL)   Date Value   01/14/2019 139 (H)     LDL Calculated (mg/dL)   Date Value   05/08/2020 93     AST (U/L)   Date Value   01/14/2019 23     ALT (U/L)   Date Value   01/14/2019 50 (H)     BUN (mg/dL)   Date Value   01/14/2019 19      (goal A1C is < 7)   (goal LDL is <100) need 30-50% reduction from baseline     BP Readings from Last 3 Encounters:   05/08/20 130/80   11/01/19 138/77   10/18/19 138/89    (goal /80)      All Future Testing planned in [facility]:  Lab Frequency Next Occurrence   Comprehensive Metabolic Panel Once 59/76/4871   Lipid Panel Once 09/14/2020   Hemoglobin A1C Once 09/14/2020       Next Visit Date:  Future Appointments   Date Time Provider Dwaine Pal   10/9/2020  9:30 AM Diandra Brizuela MD [facility] [address] [facility]            Patient Active Problem List:     GERD (gastroesophageal reflux disease)     Carpal tunnel syndrome of right wrist     Benign essential HTN     Diabetic peripheral neuropathy ([address]     Mixed hyperlipidemia     Vitamin D deficiency     Leg pain, left     Microalbuminuria     PVD (peripheral vascular disease) (HCC)     Status post partial resection of colon     Cigarette smoker     Left carotid artery stenosis     Pain in upper limb     Situational mixed anxiety and depressive disorder

## 2020-09-22 RX ORDER — AMLODIPINE BESYLATE 5 MG/1
TABLET ORAL
Qty: 30 TABLET | Refills: 2 | Status: SHIPPED | OUTPATIENT
Start: 2020-09-22 | End: 2021-01-18

## 2020-09-22 NOTE — TELEPHONE ENCOUNTER
hyperlipidemia     Vitamin D deficiency     Leg pain, left     Microalbuminuria     PVD (peripheral vascular disease) (HCC)     Status post partial resection of colon     Cigarette smoker     Left carotid artery stenosis     Pain in upper limb     Situational mixed anxiety and depressive disorder

## 2020-10-09 ENCOUNTER — OFFICE VISIT (OUTPATIENT)
Dept: FAMILY MEDICINE CLINIC | Age: 50
End: 2020-10-09
Payer: MEDICARE

## 2020-10-09 ENCOUNTER — HOSPITAL ENCOUNTER (OUTPATIENT)
Age: 50
Setting detail: SPECIMEN
Discharge: HOME | End: 2020-10-09
Payer: MEDICARE

## 2020-10-09 VITALS
WEIGHT: 184 LBS | SYSTOLIC BLOOD PRESSURE: 130 MMHG | DIASTOLIC BLOOD PRESSURE: 74 MMHG | TEMPERATURE: 97.7 F | HEIGHT: 69 IN | HEART RATE: 89 BPM | BODY MASS INDEX: 27.25 KG/M2

## 2020-10-09 LAB
ALBUMIN SERPL-MCNC: 4.8 G/DL (ref 3.5–5.2)
ALBUMIN/GLOBULIN RATIO: ABNORMAL (ref 1–2.5)
ALP BLD-CCNC: 105 U/L (ref 40–129)
ALT SERPL-CCNC: 74 U/L (ref 5–41)
ANION GAP SERPL CALCULATED.3IONS-SCNC: 15 MMOL/L (ref 9–17)
AST SERPL-CCNC: 47 U/L
BILIRUB SERPL-MCNC: 0.45 MG/DL (ref 0.3–1.2)
BUN BLDV-MCNC: 17 MG/DL (ref 6–20)
BUN/CREAT BLD: 19 (ref 9–20)
CALCIUM SERPL-MCNC: 10 MG/DL (ref 8.6–10.4)
CHLORIDE BLD-SCNC: 98 MMOL/L (ref 98–107)
CO2: 21 MMOL/L (ref 20–31)
CREAT SERPL-MCNC: 0.89 MG/DL (ref 0.7–1.2)
GFR AFRICAN AMERICAN: >60 ML/MIN
GFR NON-AFRICAN AMERICAN: >60 ML/MIN
GFR SERPL CREATININE-BSD FRML MDRD: ABNORMAL ML/MIN/{1.73_M2}
GFR SERPL CREATININE-BSD FRML MDRD: ABNORMAL ML/MIN/{1.73_M2}
GLUCOSE BLD-MCNC: 199 MG/DL (ref 70–99)
HBA1C MFR BLD: 10.6 %
PATIENT FASTING?: YES
POTASSIUM SERPL-SCNC: 3.8 MMOL/L (ref 3.7–5.3)
SODIUM BLD-SCNC: 134 MMOL/L (ref 135–144)
TOTAL PROTEIN: 7 G/DL (ref 6.4–8.3)

## 2020-10-09 PROCEDURE — 82570 ASSAY OF URINE CREATININE: CPT

## 2020-10-09 PROCEDURE — 99214 OFFICE O/P EST MOD 30 MIN: CPT | Performed by: INTERNAL MEDICINE

## 2020-10-09 PROCEDURE — 80053 COMPREHEN METABOLIC PANEL: CPT

## 2020-10-09 PROCEDURE — 82043 UR ALBUMIN QUANTITATIVE: CPT

## 2020-10-09 PROCEDURE — 90686 IIV4 VACC NO PRSV 0.5 ML IM: CPT | Performed by: INTERNAL MEDICINE

## 2020-10-09 PROCEDURE — G0008 ADMIN INFLUENZA VIRUS VAC: HCPCS | Performed by: INTERNAL MEDICINE

## 2020-10-09 PROCEDURE — 87389 HIV-1 AG W/HIV-1&-2 AB AG IA: CPT

## 2020-10-09 PROCEDURE — 83036 HEMOGLOBIN GLYCOSYLATED A1C: CPT | Performed by: INTERNAL MEDICINE

## 2020-10-09 PROCEDURE — 80061 LIPID PANEL: CPT

## 2020-10-09 RX ORDER — HYDROXYZINE HYDROCHLORIDE 25 MG/1
TABLET, FILM COATED ORAL
Qty: 90 TABLET | Refills: 1 | OUTPATIENT
Start: 2020-10-09

## 2020-10-09 RX ORDER — HYDROXYZINE HYDROCHLORIDE 25 MG/1
TABLET, FILM COATED ORAL
Qty: 90 TABLET | Refills: 3 | Status: SHIPPED | OUTPATIENT
Start: 2020-10-09 | End: 2021-02-17

## 2020-10-09 RX ORDER — MULTIVIT-MIN/IRON/FOLIC ACID/K 18-600-40
CAPSULE ORAL
COMMUNITY

## 2020-10-09 RX ORDER — PRAZOSIN HYDROCHLORIDE 1 MG/1
CAPSULE ORAL
Qty: 30 CAPSULE | Refills: 5 | Status: SHIPPED | OUTPATIENT
Start: 2020-10-09

## 2020-10-09 RX ORDER — PRAZOSIN HYDROCHLORIDE 1 MG/1
CAPSULE ORAL
Qty: 30 CAPSULE | Refills: 3 | OUTPATIENT
Start: 2020-10-09

## 2020-10-09 ASSESSMENT — ENCOUNTER SYMPTOMS
ABDOMINAL PAIN: 0
RESPIRATORY NEGATIVE: 1
CONSTIPATION: 0
DIARRHEA: 0
SORE THROAT: 0
SHORTNESS OF BREATH: 0
NAUSEA: 0
BLOOD IN STOOL: 0

## 2020-10-09 NOTE — PROGRESS NOTES
Subjective:      Patient ID: Musa Swan is a 53 y.o. male. Noah Gilford presents for a check up on his medical conditions HTN, Hyperlipidemia, DM II, Depression / anxiety. Noah Gilford denies new problems. Medications were reviewed with Noah Gilford, he is  tolerating the medication. Bowels are regular but loose. There has not been rectal bleeding. Noah Gilford denies urinary complications, the urine stream is good. Preston denies chest pain and denies increasing shortness of breath.     Past Medical History:  No date: GERD (gastroesophageal reflux disease)  No date: Hyperlipidemia  No date: Hypertension  No date: Microalbuminuria  No date: PAD (peripheral artery disease) (HCC)    Past Surgical History:  No date: APPENDECTOMY  2012: ARTERIAL BYPASS SURGRY  2006: CARDIAC SURGERY  2019: COLECTOMY      Comment:  sigmoid resection ([facility])  2019: SMALL INTESTINE SURGERY      Comment:  small bowel resection ([facility])    Social History    Socioeconomic History      Marital status:       Spouse name: Not on file      Number of children: Not on file      Years of education: Not on file      Highest education level: Not on file    Occupational History      Occupation: disability    Social Needs      Financial resource strain: Not very hard      Food insecurity        Worry: Never true        Inability: Never true      Transportation needs        Medical: No        Non-medical: No    Tobacco Use      Smoking status: Current Some Day Smoker        Types: Cigarettes        Quit date: 10/27/2011        Years since quittin.9      Smokeless tobacco: Never Used      Tobacco comment: wife smokes, passive    Substance and Sexual Activity      Alcohol use: Not on file      Drug use: Not on file      Sexual activity: Not on file    Lifestyle      Physical activity        Days per week: Not on file        Minutes per session: Not on file      Stress: Not on file    Relationships      Social connections        Talks on phone: Not on file        Gets together: Not on file        Attends [employer] service: Not on file        Active member of club or organization: Not on file        Attends meetings of clubs or organizations: Not on file        Relationship status: Not on file      Intimate partner violence        Fear of current or ex partner: Not on file        Emotionally abused: Not on file        Physically abused: Not on file        Forced sexual activity: Not on file    Other Topics      Concerns:        Not on file    Social History Narrative      Not on file      No family history on file. Current Outpatient Medications on File Prior to Visit:  Cholecalciferol (VITAMIN D) 50 MCG (2000 UT) CAPS capsule, Take by mouth, Disp: , Rfl:   metFORMIN (GLUCOPHAGE) 1000 MG tablet, Take 1 tablet by mouth 2 times daily (with meals), Disp: 60 tablet, Rfl: 2  amLODIPine (NORVASC) 5 MG tablet, TAKE 1 TABLET BY MOUTH EVERY DAY, Disp: 30 tablet, Rfl: 2  venlafaxine (EFFEXOR XR) 75 MG extended release capsule, TAKE 1 CAPSULE BY MOUTH EVERY DAY, Disp: 30 capsule, Rfl: 2  pantoprazole (PROTONIX) 40 MG tablet, TAKE 1 TABLET BY MOUTH TWICE DAILY, Disp: 30 tablet, Rfl: 5  losartan (COZAAR) 100 MG tablet, TAKE 1 TABLET BY MOUTH EVERY DAY, Disp: 30 tablet, Rfl: 5  insulin detemir (LEVEMIR) 100 UNIT/ML injection vial, Inject 75 Units into the skin nightly, Disp: 1 vial, Rfl: 2  Drug [place] Unilet Lancets 28G MISC, Test TID and prn.  DX E11.9, Z79.4, Disp: 100 each, Rfl: 5  blood glucose monitor strips, 1 strip by Other route 3 times daily as needed for Other (as needed) Dx E11.9, Z79.4 [facility] Unilet Test strips, Disp: 100 strip, Rfl: 5  Insulin Syringe-Needle U-100 30G X 1/2\" 0.5 ML MISC, Dx E11.9, Z79.4 injecting insulin QD, Disp: 100 each, Rfl: 3  metoprolol tartrate (LOPRESSOR) 50 MG tablet, TAKE 1 TABLET BY MOUTH TWICE DAILY, Disp: 60 tablet, Rfl: 5  cilostazol (PLETAL) 100 MG tablet, TAKE 1 TABLET BY MOUTH TWICE DAILY, Disp: 60 tablet, Rfl: 2  magnesium 82                  01/14/2019                 AST                      23                  01/14/2019                 ALT                      50 (H)              01/14/2019                 LABGLOM                  >60                 01/14/2019                 GFRAA                    >60                 01/14/2019              Lab Results       Component                Value               Date                       LABA1C                   8.2                 05/08/2020            Lab Results       Component                Value               Date                       EAG                      203                 01/14/2019              Lab Results       Component                Value               Date                       CHOL                     180                 05/08/2020                 CHOL                     221 (H)             01/14/2019                 CHOL                     188                 10/08/2018            Lab Results       Component                Value               Date                       TRIG                     269                 05/08/2020                 TRIG                     233 (H)             01/14/2019                 TRIG                     109                 10/08/2018            Lab Results       Component                Value               Date                       HDL                      33 (A)              05/08/2020                 HDL                      35 (L)              01/14/2019                 HDL                      37 (L)              10/08/2018            Lab Results       Component                Value               Date                       LDLCHOLESTEROL           139 (H)             01/14/2019                 LDLCHOLESTEROL           129                 10/08/2018                 LDLCHOLESTEROL           119                 10/09/2015                 [address]                  93                  05/08/2020 [facility]                  91                  03/28/2018                 LDLCALC                  87                  06/14/2017            Lab Results       Component                Value               Date                       VLDL                     54                  05/08/2020                 VLDL                     NOT REPORTED        01/14/2019                 VLDL                     NOT REPORTED        10/08/2018            Lab Results       Component                Value               Date                       CHOLHDLRATIO             6.3 (H)             01/14/2019                 CHOLHDLRATIO             5.1 (H)             10/08/2018                 CHOLHDLRATIO             5.4 (H)             10/09/2015                              Hypertension   This is a chronic problem. The current episode started more than 1 year ago. The problem is unchanged. The problem is controlled. Pertinent negatives include no chest pain, neck pain, palpitations or shortness of breath. Risk factors for coronary artery disease include diabetes mellitus, dyslipidemia and male gender. Past treatments include calcium channel blockers and angiotensin blockers. The current treatment provides significant improvement. There are no compliance problems. There is no history of angina. Hyperlipidemia   This is a chronic problem. The current episode started more than 1 year ago. The problem is controlled. Recent lipid tests were reviewed and are normal. Pertinent negatives include no chest pain, myalgias or shortness of breath. Current antihyperlipidemic treatment includes statins. The current treatment provides significant improvement of lipids. There are no compliance problems. Review of Systems   Constitutional: Negative. HENT: Negative for congestion, ear pain, postnasal drip, sneezing and sore throat. Eyes: Negative for visual disturbance. Respiratory: Negative. Negative for shortness of breath. Cardiovascular: Negative for chest pain, palpitations and leg swelling. Gastrointestinal: Negative for abdominal pain, blood in stool, constipation, diarrhea and nausea. Genitourinary: Negative for difficulty urinating, dysuria, frequency and urgency. Musculoskeletal: Negative for arthralgias, joint swelling, myalgias, neck pain and neck stiffness. Skin: Negative. Neurological: Negative for syncope. Psychiatric/Behavioral: Negative. Objective:   Physical Exam  Vitals signs and nursing note reviewed. Constitutional:       Appearance: He is well-developed. HENT:      Head: Atraumatic. Eyes:      Conjunctiva/sclera: Conjunctivae normal.   Neck:      Musculoskeletal: Normal range of motion and neck supple. Cardiovascular:      Rate and Rhythm: Normal rate and regular rhythm. Heart sounds: Normal heart sounds. Pulmonary:      Effort: Pulmonary effort is normal.      Breath sounds: Normal breath sounds. Abdominal:      Palpations: Abdomen is soft. Tenderness: There is no abdominal tenderness. Lymphadenopathy:      Cervical: No cervical adenopathy. Skin:     Findings: No rash. Neurological:      Mental Status: He is alert. Psychiatric:         Behavior: Behavior normal.         Thought Content: Thought content normal.         Assessment:       Diagnosis Orders   1. Controlled type 2 diabetes mellitus without complication, with long-term current use of insulin ([facility])   DIABETES FOOT EXAM    Microalbumin / Creatinine Urine Ratio   2. Need for influenza vaccination  INFLUENZA, QUADV, 3 YRS AND OLDER, IM PF, PREFILL SYR OR SDV, 0.5ML (AFLURIA QUADV, PF)   3. Encounter for screening for HIV  HIV Screen   4. Microalbuminuria     5. PVD (peripheral vascular disease) ([address]     6. Gastroesophageal reflux disease without esophagitis     7. Vitamin D deficiency     8. Mixed hyperlipidemia     9. Diabetic peripheral neuropathy ([address]     10.  Benign essential HTN             Plan: 1. Need for influenza vaccination  Preston was given an influenza vaccine today. - INFLUENZA, QUADV, 3 YRS AND OLDER, IM PF, PREFILL SYR OR SDV, 0.5ML (AFLURIA QUADV, PF)    2. Encounter for screening for HIV  HIV screening today. - HIV Screen; Future    3. Controlled type 2 diabetes mellitus without complication, with long-term current use of insulin (HCC)  Controlled on the current medication. HgbA1C today. -  DIABETES FOOT EXAM  - Microalbumin / Creatinine Urine Ratio; Future    4. Microalbuminuria  ACE inhibitor was discontinued when he had renal failure in the hospital.     5. PVD (peripheral vascular disease) ([facility]  Symptoms have been stable. 6. Gastroesophageal reflux disease without esophagitis  Controlled on Protonix. 7. Vitamin D deficiency  Controlled on Vitamin D replacement. 8. Mixed hyperlipidemia  Controlled on statin. Labs today. 9. Diabetic peripheral neuropathy (HCC)  Stable symptoms. 10. Benign essential HTN  Controlled on the current medication. Preston was instructed to follow up in the clinic in 6 months for check up or as needed with any medical issues.                       Teodoro Rivera MD

## 2020-10-10 LAB
CHOLESTEROL/HDL RATIO: 5.7
CHOLESTEROL: 198 MG/DL
CREATININE URINE: 192.4 MG/DL (ref 39–259)
HDLC SERPL-MCNC: 35 MG/DL
HIV AG/AB: NONREACTIVE
LDL CHOLESTEROL: 85 MG/DL (ref 0–130)
MICROALBUMIN/CREAT 24H UR: 46 MG/L
MICROALBUMIN/CREAT UR-RTO: 24 MCG/MG CREAT
TRIGL SERPL-MCNC: 388 MG/DL
VLDLC SERPL CALC-MCNC: ABNORMAL MG/DL (ref 1–30)

## 2020-10-12 ENCOUNTER — TELEPHONE (OUTPATIENT)
Dept: FAMILY MEDICINE CLINIC | Age: 50
End: 2020-10-12

## 2020-10-12 RX ORDER — INSULIN DETEMIR 100 [IU]/ML
75 INJECTION, SOLUTION SUBCUTANEOUS NIGHTLY
Qty: 5 PEN | Refills: 3 | Status: SHIPPED | OUTPATIENT
Start: 2020-10-12 | End: 2020-10-30 | Stop reason: DRUGHIGH

## 2020-10-12 NOTE — TELEPHONE ENCOUNTER
Pt wanted you to know he has not taken his Levemir in almost a month and a half. He needs the pens called in. He cant draw it up.  (he passes out)

## 2020-10-27 ENCOUNTER — OFFICE VISIT (OUTPATIENT)
Dept: FAMILY MEDICINE CLINIC | Age: 50
End: 2020-10-27
Payer: MEDICARE

## 2020-10-27 VITALS
BODY MASS INDEX: 27.25 KG/M2 | SYSTOLIC BLOOD PRESSURE: 136 MMHG | HEIGHT: 69 IN | HEART RATE: 93 BPM | DIASTOLIC BLOOD PRESSURE: 75 MMHG | WEIGHT: 184 LBS

## 2020-10-27 PROCEDURE — G0438 PPPS, INITIAL VISIT: HCPCS | Performed by: INTERNAL MEDICINE

## 2020-10-27 ASSESSMENT — PATIENT HEALTH QUESTIONNAIRE - PHQ9
2. FEELING DOWN, DEPRESSED OR HOPELESS: 0
SUM OF ALL RESPONSES TO PHQ QUESTIONS 1-9: 2
SUM OF ALL RESPONSES TO PHQ9 QUESTIONS 1 & 2: 2
1. LITTLE INTEREST OR PLEASURE IN DOING THINGS: 2

## 2020-10-27 ASSESSMENT — LIFESTYLE VARIABLES: HOW OFTEN DO YOU HAVE A DRINK CONTAINING ALCOHOL: 0

## 2020-10-27 NOTE — PATIENT INSTRUCTIONS
Personalized Preventive Plan for Sukhdev Mancilla - 10/27/2020  Medicare offers a range of preventive health benefits. Some of the tests and screenings are paid in full while other may be subject to a deductible, co-insurance, and/or copay. Some of these benefits include a comprehensive review of your medical history including lifestyle, illnesses that may run in your family, and various assessments and screenings as appropriate. After reviewing your medical record and screening and assessments performed today your provider may have ordered immunizations, labs, imaging, and/or referrals for you. A list of these orders (if applicable) as well as your Preventive Care list are included within your After Visit Summary for your review. Other Preventive Recommendations:    · A preventive eye exam performed by an eye specialist is recommended every 1-2 years to screen for glaucoma; cataracts, macular degeneration, and other eye disorders. · A preventive dental visit is recommended every 6 months. · Try to get at least 150 minutes of exercise per week or 10,000 steps per day on a pedometer . · Order or download the FREE \"Exercise & Physical Activity: Your Everyday Guide\" from [employer]. Call 7-841.674.8320 or search [employer] online. · You need 6983-6762 mg of calcium and 3446-0694 IU of vitamin D per day. It is possible to meet your calcium requirement with diet alone, but a vitamin D supplement is usually necessary to meet this goal.  · When exposed to the sun, use a sunscreen that protects against both UVA and UVB radiation with an SPF of 30 or greater. Reapply every 2 to 3 hours or after sweating, drying off with a towel, or swimming. · Always wear a seat belt when traveling in a car. Always wear a helmet when riding a bicycle or motorcycle. Survey: You may be receiving a survey from [employer] regarding your visit today.      You may get this in the mail, through your MyChart or in your email. Please complete the survey to enable us to provide the highest quality of care to you and your family. Please also, mention our names. If you cannot score us as very good (5 Stars) on any question, please feel free to call the office to discuss how we could have made your experience exceptional.      Thank You!         Dr. Ronel Pickering MD    [facility], [address] [place] [address], [place], [place]

## 2020-10-27 NOTE — PROGRESS NOTES
Medicare Annual Wellness Visit  Name: Kimberli Duff Date: 10/27/2020   MRN: [id number] Sex: Male   Age: 53 y.o. Ethnicity: Non-/Non    : 1970 Race: Kenyatta Cloud is here for Medicare AWV    Screenings for behavioral, psychosocial and functional/safety risks, and cognitive dysfunction are all negative except as indicated below. These results, as well as other patient data from the [address] form, are documented in Flowsheets linked to this Encounter. Allergies   Allergen Reactions    Codeine      Sleeps too much    Oxycodone-Acetaminophen      Other reaction(s): Mental Status Change       Prior to Visit Medications    Medication Sig Taking? Authorizing Provider   insulin detemir (LEVEMIR FLEXTOUCH) 100 UNIT/ML injection pen Inject 75 Units into the skin nightly  John Mathias MD   Cholecalciferol (VITAMIN D) 50 MCG (2000) CAPS capsule Take by mouth  Historical Provider, MD   hydrOXYzine (ATARAX) 25 MG tablet TAKE 1 TABLET BY MOUTH THREE TIMES DAILY AS NEEDED FOR ANXIETY  John Mathias MD   prazosin (MINIPRESS) 1 MG capsule TAKE 1 CAPSULE BY MOUTH EVERY EVENING  John Mathias MD   metFORMIN (GLUCOPHAGE) 1000 MG tablet Take 1 tablet by mouth 2 times daily (with meals)  John Mathias MD   amLODIPine (NORVASC) 5 MG tablet TAKE 1 TABLET BY MOUTH EVERY DAY  John Mathias MD   venlafaxine (EFFEXOR XR) 75 MG extended release capsule TAKE 1 CAPSULE BY MOUTH EVERY DAY  Bg Hammond MD   pantoprazole (PROTONIX) 40 MG tablet TAKE 1 TABLET BY MOUTH TWICE DAILY  John Mathias MD   losartan (COZAAR) 100 MG tablet TAKE 1 TABLET BY MOUTH EVERY DAY  John Mathias MD   insulin detemir (LEVEMIR) 100 UNIT/ML injection vial Inject 75 Units into the skin nightly  Florencia Mathias MD   Drug [facility] Unilet Lancets 28G MISC Test TID and prn.  DX E11.9, Z79.4  John Mathias MD   blood glucose monitor strips 1 strip by Other route 3 times daily as needed for Other (as needed) Dx E11.9, Z79.4 Drug [facility] Test strips  John Mathias MD   Insulin Syringe-Needle U-100 30G X 1/2\" 0.5 ML MISC Dx E11.9, Z79.4 injecting insulin QD  John Mathias MD   metoprolol tartrate (LOPRESSOR) 50 MG tablet TAKE 1 TABLET BY MOUTH TWICE DAILY  John Mathias MD   cilostazol (PLETAL) 100 MG tablet TAKE 1 TABLET BY MOUTH TWICE DAILY  Patient not taking: Reported on 10/27/2020  John Mathias MD   magnesium oxide (MAG-OX) 400 (241.3 Mg) MG TABS tablet TAKE 1 TABLET BY MOUTH DAILY  Historical Provider, MD   metoclopramide (REGLAN) 10 MG tablet Take 10 mg by mouth  Historical Provider, MD   hyoscyamine (ANASPAZ;LEVSIN) 125 MCG tablet Take 125 mcg by mouth every 4 hours as needed for Cramping  Historical Provider, MD   loperamide (IMODIUM) 2 MG capsule Take 2 mg by mouth 4 times daily as needed for Diarrhea  Historical Provider, MD   busPIRone (BUSPAR) 10 MG tablet TAKE 1 TABLET BY MOUTH TWICE DAILY  WILIAN Vazquez - NP   TRUE METRIX BLOOD GLUCOSE TEST strip Test BS TID and prn. John Mathias MD   aspirin 81 MG EC tablet Take 81 mg by mouth daily. Historical Provider, MD       Past Medical History:   Diagnosis Date    GERD (gastroesophageal reflux disease)     Hyperlipidemia     Hypertension     Microalbuminuria     [facility]AD [facility]peripheral artery disease) ([facility]        Past Surgical History:   Procedure Laterality Date    APPENDECTOMY      ARTERIAL BYPASS SURGRY  8/2012    CARDIAC SURGERY  2006    COLECTOMY  08/2019    sigmoid resection ([facility])    SMALL INTESTINE SURGERY  08/2019    small bowel resection ([facility])       No family history on file.     CareTeam (Including outside providers/suppliers regularly involved in providing care):   Patient Care Team:  Mike Mendez MD as PCP - General (Internal Medicine)  Mike Mendez MD as PCP [facility] Empaneled Provider  Shelia Costello MD (Vascular Surgery)    Wt Readings from Last 3 Encounters:   10/27/20 184 lb (83.5 kg)   10/09/20 184 lb (83.5 kg)   05/08/20 188 lb (85.3 kg)     Vitals: 10/27/20 1349   BP: 136/75   Site: Right Upper Arm   Position: Sitting   Cuff Size: Large Adult   Pulse: 93   Weight: 184 lb (83.5 kg)   Height: 5' 9\" (1.753 m)     Body mass index is 27.17 kg/m². Based upon direct observation of the patient, evaluation of cognition reveals recent and remote memory intact. Patient's complete Health Risk Assessment and screening values have been reviewed and are found in Flowsheets. The following problems were reviewed today and where indicated follow up appointments were made and/or referrals ordered. Positive Risk Factor Screenings with Interventions:     Substance History:  Social History     Tobacco History     Smoking Status  Current Some Day Smoker Last attempt to quit  10/27/2011 Smoking Tobacco Type  Cigarettes    Smokeless Tobacco Use  Never Used    Tobacco Comment  wife smokes, passive          Alcohol History     Alcohol Use Status  Not Asked          Drug Use     Drug Use Status  Not Asked          Sexual Activity     Sexually Active  Not Asked               Alcohol Screening:       A score of 8 or more is associated with harmful or hazardous drinking. A score of 13 or more in women, and 15 or more in men, is likely to indicate alcohol dependence. Substance Abuse Interventions:  · Tobacco abuse:  patient agrees to think about his/her triggers for tobacco use, why he/she should quit, and learn more about the harmful effects of tobacco    General Health and ACP:  General  In general, how would you say your health is?: Fair  In the past 7 days, have you experienced any of the following?  New or Increased Pain, New or Increased Fatigue, Loneliness, Social Isolation, Stress or Anger?: None of These  Do you get the social and emotional support that you need?: Yes  Do you have a Living Will?: Yes  Advance Directives     Power of  Living Will ACP-Advance Directive ACP-Power of     Not on File Not on [address] Interventions:  · no concerns    Health Habits/Nutrition:  Health Habits/Nutrition  Do you exercise for at least 20 minutes 2-3 times per week?: Yes  Have you lost any weight without trying in the past 3 months?: No  Do you eat fewer than 2 meals per day?: (!) Yes  Have you seen a dentist within the past year?: Yes  Body mass index: (!) 27.17  Health Habits/Nutrition Interventions:  · Inadequate physical activity:  patient agrees to exercise for at least 150 minutes/week    ADL:  ADLs  In the past 7 days, did you need help from others to perform any of the following everyday activities? Eating, dressing, grooming, bathing, toileting, or walking/balance?: (!) Walking/Balance(due to previous stroke)  In the past 7 days, did you need help from others to take care of any of the following?  Laundry, housekeeping, banking/finances, shopping, telephone use, food preparation, transportation, or taking medications?: [employer], Housekeeping, Shopping, Taking Medications(difficulty standing for long periods of time due to previous stroke)  ADL Interventions:  · Has someone help him due to need to stand for long periods of time     Personalized Preventive Plan   Current Health Maintenance Status  Immunization History   Administered Date(s) Administered    Influenza 11/10/2011, 11/01/2012, 10/15/2013    Influenza Virus Vaccine 10/17/2014, 10/09/2015    Influenza, Quadv, IM, PF (6 mo and older Fluzone, Flulaval, Fluarix, and 3 yrs and older Afluria) 10/20/2017, 10/08/2018, 10/15/2019, 10/09/2020    Influenza, Triv, 3 Years and older, IM (Afluria (5 yrs and older) 09/06/2016    Pneumococcal Polysaccharide ([id number]) 11/10/2011, 10/15/2013    Tdap (Boostrix, Adacel) 11/19/2012        Health Maintenance   Topic Date Due    Hepatitis B vaccine (1 of 3 - Risk 3-dose series) 04/15/1990    Diabetic retinal exam  10/08/2016    Annual Wellness Visit (AWV)  07/26/2019    A1C test (Diabetic or Prediabetic)  01/09/2021    your state's Next of Kin hierarchy, recommend that patient complete ACP document that meets state-specific requirements to allow them to act on the patient's behalf when appropriate. Care Preferences:    Hospitalization: \"If your health worsens and it becomes clear that your chance of recovery is unlikely, what would your preference be regarding hospitalization? \"  If the patient would want hospitalization, answer \"yes\". If the patient would prefer comfort-focused treatment without hospitalization, answer \"no\". YES/NO/WILD CARDS: yes      Ventilation: \"If you were in your present state of health and suddenly became very ill and were unable to breathe on your own, what would your preference be about the use of a ventilator (breathing machine) if it was available to you? \"    If patient would desire the use of a ventilator (breathing machine), answer \"yes\", if not answer \"no\":yes    \"If your health worsens and it becomes clear that your chance of recovery is unlikely, what would your preference be about the use of a ventilator (breathing machine) if it was available to you? \"   yes    Resuscitation:  \"CPR works best to restart the heart when there is a sudden event, like a heart attack, in someone who is otherwise healthy. Unfortunately, CPR does not typically restart the heart for people who have serious health conditions or who are very sick. \"    \"In the event your heart stopped as a result of an underlying serious health condition, would you want attempts to be made to restart your heart (answer \"yes\" for attempt to resuscitate) or would you prefer a natural death (answer \"no\" for do not attempt to resuscitate)? \"   yes     NOTE: If the patient has a valid advance directive AND provides care preference(s) that are inconsistent with that prior directive, advise the patient to consider either: creating a new advance directive that complies with state-specific requirements; or, if that is not possible, orally revoking that prior directive in accordance with state-specific requirements, which must be documented in the EHR.     Conversation Outcomes / Follow-Up Plan:   Full code order pended      Length of Voluntary ACP Conversation in minutes:  30 minutes    [employer]

## 2020-10-28 ENCOUNTER — TELEPHONE (OUTPATIENT)
Dept: FAMILY MEDICINE CLINIC | Age: 50
End: 2020-10-28

## 2020-10-28 NOTE — TELEPHONE ENCOUNTER
Pt states he saw Dr Sean Miles 9/1/20 (see encounters) and had numerous testing and was \"cleared\". Pt states he was told he does not need to take Pletal anymore. Pt would like clarification on this.

## 2020-10-30 ENCOUNTER — TELEPHONE (OUTPATIENT)
Dept: FAMILY MEDICINE CLINIC | Age: 50
End: 2020-10-30

## 2020-10-30 RX ORDER — INSULIN DETEMIR 100 [IU]/ML
78 INJECTION, SOLUTION SUBCUTANEOUS NIGHTLY
Qty: 5 PEN | Refills: 3 | Status: SHIPPED
Start: 2020-10-30 | End: 2021-01-07 | Stop reason: DRUGHIGH

## 2020-11-06 ENCOUNTER — TELEPHONE (OUTPATIENT)
Dept: FAMILY MEDICINE CLINIC | Age: 50
End: 2020-11-06

## 2020-11-06 NOTE — TELEPHONE ENCOUNTER
Pt called and requests pain medication for chronic back pain d/t arthritis. Advised pt that Dr Delfino Johnson does not prescribed controlled substances and pain mtg may be advised. Pt requests tylenol #3 with codeine to be able to take PRN for back pain. Advised pt he may have to have an appt to discuss with Dr Mathias before medications are called in. Please advise which direction this patient needs to go.

## 2020-11-09 ENCOUNTER — HOSPITAL ENCOUNTER (OUTPATIENT)
Dept: GENERAL RADIOLOGY | Age: 50
Discharge: HOME | End: 2020-11-11
Payer: MEDICARE

## 2020-11-09 ENCOUNTER — OFFICE VISIT (OUTPATIENT)
Dept: FAMILY MEDICINE CLINIC | Age: 50
End: 2020-11-09
Payer: MEDICARE

## 2020-11-09 ENCOUNTER — HOSPITAL ENCOUNTER (OUTPATIENT)
Age: 50
Discharge: HOME | End: 2020-11-11
Payer: MEDICARE

## 2020-11-09 VITALS
SYSTOLIC BLOOD PRESSURE: 106 MMHG | TEMPERATURE: 98 F | DIASTOLIC BLOOD PRESSURE: 62 MMHG | HEIGHT: 69 IN | WEIGHT: 187 LBS | BODY MASS INDEX: 27.7 KG/M2 | HEART RATE: 96 BPM

## 2020-11-09 PROBLEM — Z95.1 HISTORY OF CORONARY ARTERY BYPASS GRAFT: Status: ACTIVE | Noted: 2020-06-04

## 2020-11-09 PROBLEM — Z95.5 H/O HEART ARTERY STENT: Status: ACTIVE | Noted: 2020-06-04

## 2020-11-09 PROBLEM — Z86.718 HISTORY OF DVT (DEEP VEIN THROMBOSIS): Status: ACTIVE | Noted: 2020-06-04

## 2020-11-09 PROCEDURE — 99213 OFFICE O/P EST LOW 20 MIN: CPT | Performed by: INTERNAL MEDICINE

## 2020-11-09 PROCEDURE — 72110 X-RAY EXAM L-2 SPINE 4/>VWS: CPT

## 2020-11-09 RX ORDER — PREDNISONE 20 MG/1
40 TABLET ORAL DAILY
Qty: 10 TABLET | Refills: 0 | Status: SHIPPED | OUTPATIENT
Start: 2020-11-09 | End: 2020-11-14

## 2020-11-09 RX ORDER — CYCLOBENZAPRINE HCL 10 MG
10 TABLET ORAL 3 TIMES DAILY PRN
Qty: 30 TABLET | Refills: 0 | Status: SHIPPED | OUTPATIENT
Start: 2020-11-09 | End: 2020-12-04 | Stop reason: SDUPTHER

## 2020-11-09 RX ORDER — ACETAMINOPHEN AND CODEINE PHOSPHATE 300; 30 MG/1; MG/1
1 TABLET ORAL EVERY 6 HOURS PRN
Qty: 28 TABLET | Refills: 0 | Status: SHIPPED | OUTPATIENT
Start: 2020-11-09 | End: 2020-12-04 | Stop reason: SDUPTHER

## 2020-11-09 ASSESSMENT — ENCOUNTER SYMPTOMS
BLOOD IN STOOL: 0
DIARRHEA: 0
BACK PAIN: 1
SORE THROAT: 0
CONSTIPATION: 0
NAUSEA: 0
ABDOMINAL PAIN: 0
RESPIRATORY NEGATIVE: 1

## 2020-11-09 NOTE — PATIENT INSTRUCTIONS
Survey: You may be receiving a survey from [employer] regarding your visit today. You may get this in the mail, through your MyChart or in your email. Please complete the survey to enable us to provide the highest quality of care to you and your family. Please also, mention our names. If you cannot score us as very good (5 Stars) on any question, please feel free to call the office to discuss how we could have made your experience exceptional.      Thank You! Dr. Berta Pena MD    [facility], [address] [place] [address], [address] [place] [address], [facility]    Yanira Lorena, [facility]    1. Chronic midline low back pain without sciatica  Xray the lumbar spine to evaluate the degree of DDD. Take Prednisone 20 m tablets daily x 5 days. [place] was told his BS will be elevated while on Prednisone. Take Flexeril 10 mg every 8 hours as needed for back spasms. Tylenol 3:  1 tablet every 6 hours as needed for pain. Consider a referral to pain mgt if pain continues.

## 2020-11-09 NOTE — PROGRESS NOTES
Subjective:      Patient ID: Deandre Avilez is a 53 y.o. male. Chris Loredo states he has chronic back pain. He has episodes where it flairs up. Most of his pain is in the lower back. He had an xray in 2016 showing DDD. No radiation of the back. Tylenol 3 at home with a heating pad does help. No recent injury or trauma. Chris Loredo has seen a pain mgt specialist in [place] a few years back (Dr. Imer Escobedo). Review of Systems   Constitutional: Negative. HENT: Negative for congestion, ear pain, postnasal drip, sneezing and sore throat. Eyes: Negative for visual disturbance. Respiratory: Negative. Cardiovascular: Negative for chest pain, palpitations and leg swelling. Gastrointestinal: Negative for abdominal pain, blood in stool, constipation, diarrhea and nausea. Genitourinary: Negative for difficulty urinating, dysuria, frequency and urgency. Musculoskeletal: Positive for back pain. Negative for arthralgias, joint swelling, myalgias, neck pain and neck stiffness. Skin: Negative. Neurological: Negative for syncope. Psychiatric/Behavioral: Negative. Objective:   Physical Exam  Vitals signs and nursing note reviewed. Constitutional:       Appearance: He is well-developed. HENT:      Head: Atraumatic. Eyes:      Conjunctiva/sclera: Conjunctivae normal.   Neck:      Musculoskeletal: Normal range of motion and neck supple. Cardiovascular:      Rate and Rhythm: Normal rate and regular rhythm. Heart sounds: Normal heart sounds. Pulmonary:      Effort: Pulmonary effort is normal.      Breath sounds: Normal breath sounds. Abdominal:      Palpations: Abdomen is soft. Tenderness: There is no abdominal tenderness. Musculoskeletal:         General: Tenderness present. Comments: Pain over the lower lumbar spine and edward spinal with tightening of the muscles. Lymphadenopathy:      Cervical: No cervical adenopathy. Skin:     Findings: No rash.    Neurological:

## 2020-11-12 RX ORDER — PANTOPRAZOLE SODIUM 40 MG/1
40 TABLET, DELAYED RELEASE ORAL DAILY
Qty: 30 TABLET | Refills: 5 | Status: SHIPPED | OUTPATIENT
Start: 2020-11-12 | End: 2020-12-04

## 2020-11-12 RX ORDER — VENLAFAXINE HYDROCHLORIDE 75 MG/1
CAPSULE, EXTENDED RELEASE ORAL
Qty: 30 CAPSULE | Refills: 2 | Status: SHIPPED | OUTPATIENT
Start: 2020-11-12 | End: 2022-01-03 | Stop reason: SDUPTHER

## 2020-11-12 NOTE — TELEPHONE ENCOUNTER
Health Maintenance   Topic Date Due    Hepatitis B vaccine (1 of 3 - Risk 3-dose series) 04/15/1990    Diabetic retinal exam  10/08/2016    A1C test (Diabetic or Prediabetic)  01/09/2021    Diabetic foot exam  10/09/2021    Diabetic microalbuminuria test  10/09/2021    Lipid screen  10/09/2021    Potassium monitoring  10/09/2021    Creatinine monitoring  10/09/2021    Annual Wellness Visit (AWV)  10/28/2021    DTaP/Tdap/Td vaccine (2 - Td) 11/19/2022    Flu vaccine  Completed    Pneumococcal 0-64 years Vaccine  Completed    HIV screen  Completed    Hepatitis A vaccine  Aged Out    Hib vaccine  Aged Out    Meningococcal (ACWY) vaccine  Aged Out             (applicable per patient's age: Cancer Screenings, Depression Screening, Fall Risk Screening, Immunizations)    Hemoglobin A1C (%)   Date Value   10/09/2020 10.6   05/08/2020 8.2   01/14/2019 8.7 (H)     Microalb/Crt.  Ratio (mcg/mg creat)   Date Value   10/09/2020 24 (H)     LDL Cholesterol (mg/dL)   Date Value   10/09/2020 85     LDL Calculated (mg/dL)   Date Value   05/08/2020 93     AST (U/L)   Date Value   10/09/2020 47 (H)     ALT (U/L)   Date Value   10/09/2020 74 (H)     BUN (mg/dL)   Date Value   10/09/2020 17      (goal A1C is < 7)   (goal LDL is <100) need 30-50% reduction from baseline     BP Readings from Last 3 Encounters:   11/09/20 106/62   10/27/20 136/75   10/09/20 130/74    (goal /80)      All Future Testing planned in [facility]:      Next Visit Date:  Future Appointments   Date Time Provider Dwaine Pal   4/12/2021 10:00 AM Lazaro Daigle MD [facility] [address] [facility]   10/28/2021  2:45 PM MD Dee Olson [facility] [address] [facility]            Patient Active Problem List:     GERD (gastroesophageal reflux disease)     Carpal tunnel syndrome of right wrist     Benign essential HTN     Diabetic peripheral neuropathy (HCC)     Mixed hyperlipidemia     Vitamin D deficiency     Leg pain, left     Microalbuminuria     PVD (peripheral vascular disease) ([facility] [address]     Status post partial resection of colon     Cigarette smoker     Left carotid artery stenosis     Pain in upper limb     Situational mixed anxiety and depressive disorder     History of coronary artery bypass graft     H/O heart artery stent     History of DVT (deep vein thrombosis)

## 2020-11-12 NOTE — TELEPHONE ENCOUNTER
Health Maintenance   Topic Date Due    Hepatitis B vaccine (1 of 3 - Risk 3-dose series) 04/15/1990    Diabetic retinal exam  10/08/2016    A1C test (Diabetic or Prediabetic)  01/09/2021    Diabetic foot exam  10/09/2021    Diabetic microalbuminuria test  10/09/2021    Lipid screen  10/09/2021    Potassium monitoring  10/09/2021    Creatinine monitoring  10/09/2021    Annual Wellness Visit (AWV)  10/28/2021    DTaP/Tdap/Td vaccine (2 - Td) 11/19/2022    Flu vaccine  Completed    Pneumococcal 0-64 years Vaccine  Completed    HIV screen  Completed    Hepatitis A vaccine  Aged Out    Hib vaccine  Aged Out    Meningococcal (ACWY) vaccine  Aged Out             (applicable per patient's age: Cancer Screenings, Depression Screening, Fall Risk Screening, Immunizations)    Hemoglobin A1C (%)   Date Value   10/09/2020 10.6   05/08/2020 8.2   01/14/2019 8.7 (H)     Microalb/Crt.  Ratio (mcg/mg creat)   Date Value   10/09/2020 24 (H)     LDL Cholesterol (mg/dL)   Date Value   10/09/2020 85     LDL Calculated (mg/dL)   Date Value   05/08/2020 93     AST (U/L)   Date Value   10/09/2020 47 (H)     ALT (U/L)   Date Value   10/09/2020 74 (H)     BUN (mg/dL)   Date Value   10/09/2020 17      (goal A1C is < 7)   (goal LDL is <100) need 30-50% reduction from baseline     BP Readings from Last 3 Encounters:   11/09/20 106/62   10/27/20 136/75   10/09/20 130/74    (goal /80)      All Future Testing planned in [facility]:      Next Visit Date:  Future Appointments   Date Time Provider Dwaine Pal   4/12/2021 10:00 AM MD Blaine Monteiro   10/28/2021  2:45 PM Jodie Day, MD [facility] [place]            Patient Active Problem List:     GERD (gastroesophageal reflux disease)     Carpal tunnel syndrome of right wrist     Benign essential HTN     Diabetic peripheral neuropathy (HCC)     Mixed hyperlipidemia     Vitamin D deficiency     Leg pain, left     Microalbuminuria     PVD (peripheral vascular disease) ([facility] [address]     Status post partial resection of colon     Cigarette smoker     Left carotid artery stenosis     Pain in upper limb     Situational mixed anxiety and depressive disorder     History of coronary artery bypass graft     H/O heart artery stent     History of DVT (deep vein thrombosis)

## 2020-12-04 ENCOUNTER — TELEPHONE (OUTPATIENT)
Dept: FAMILY MEDICINE CLINIC | Age: 50
End: 2020-12-04

## 2020-12-04 RX ORDER — CYCLOBENZAPRINE HCL 10 MG
10 TABLET ORAL 3 TIMES DAILY PRN
Qty: 30 TABLET | Refills: 0 | Status: SHIPPED | OUTPATIENT
Start: 2020-12-04 | End: 2021-03-01 | Stop reason: SDUPTHER

## 2020-12-04 RX ORDER — ACETAMINOPHEN AND CODEINE PHOSPHATE 300; 30 MG/1; MG/1
1 TABLET ORAL EVERY 6 HOURS PRN
Qty: 28 TABLET | Refills: 0 | Status: SHIPPED | OUTPATIENT
Start: 2020-12-04 | End: 2021-03-01 | Stop reason: SDUPTHER

## 2020-12-04 RX ORDER — PANTOPRAZOLE SODIUM 40 MG/1
TABLET, DELAYED RELEASE ORAL
Qty: 60 TABLET | Refills: 3 | Status: SHIPPED | OUTPATIENT
Start: 2020-12-04 | End: 2021-04-26

## 2020-12-04 NOTE — TELEPHONE ENCOUNTER
Pt request a referral to [facility], [facility].

## 2020-12-04 NOTE — TELEPHONE ENCOUNTER
Can give one more Rx but if he needs further pain medication he will need to have a referral to pain mgt.

## 2020-12-04 NOTE — TELEPHONE ENCOUNTER
Pt called for xray results of his Lumbar. Pt states he is still having pain and would like to have another prescription of the tylenol 3 and muscle relaxer sent to [facility] [place] [facility]   Topic Date Due    Hepatitis B vaccine (1 of 3 - Risk 3-dose series) 04/15/1990    Diabetic retinal exam  10/08/2016    A1C test (Diabetic or Prediabetic)  01/09/2021    Diabetic foot exam  10/09/2021    Diabetic microalbuminuria test  10/09/2021    Lipid screen  10/09/2021    Potassium monitoring  10/09/2021    Creatinine monitoring  10/09/2021    Annual Wellness Visit (AWV)  10/28/2021    DTaP/Tdap/Td vaccine (2 - Td) 11/19/2022    Flu vaccine  Completed    Pneumococcal 0-64 years Vaccine  Completed    HIV screen  Completed    Hepatitis A vaccine  Aged Out    Hib vaccine  Aged Out    Meningococcal (ACWY) vaccine  Aged Out             (applicable per patient's age: Cancer Screenings, Depression Screening, Fall Risk Screening, Immunizations)    Hemoglobin A1C (%)   Date Value   10/09/2020 10.6   05/08/2020 8.2   01/14/2019 8.7 (H)     Microalb/Crt.  Ratio (mcg/mg creat)   Date Value   10/09/2020 24 (H)     LDL Cholesterol (mg/dL)   Date Value   10/09/2020 85     LDL Calculated (mg/dL)   Date Value   05/08/2020 93     AST (U/L)   Date Value   10/09/2020 47 (H)     ALT (U/L)   Date Value   10/09/2020 74 (H)     BUN (mg/dL)   Date Value   10/09/2020 17      (goal A1C is < 7)   (goal LDL is <100) need 30-50% reduction from baseline     BP Readings from Last 3 Encounters:   11/09/20 106/62   10/27/20 136/75   10/09/20 130/74    (goal /80)      All Future Testing planned in [facility]:      Next Visit Date:  Future Appointments   Date Time Provider Dwaine Pal   4/12/2021 10:00 AM Lazaro Daigle MD [facility] [place] [facility]"" [address] [facility]   10/28/2021  2:45 PM MD Dee Olson [address] [facility]            Patient Active Problem List:     GERD (gastroesophageal reflux disease)     Carpal tunnel syndrome of right wrist     Benign essential HTN     Diabetic peripheral neuropathy (HCC)     Mixed hyperlipidemia     Vitamin D deficiency     Leg pain, left     Microalbuminuria     PVD (peripheral vascular disease) (HCC)     Status post partial resection of colon     Cigarette smoker     Left carotid artery stenosis     Pain in upper limb     Situational mixed anxiety and depressive disorder     History of coronary artery bypass graft     H/O heart artery stent     History of DVT (deep vein thrombosis)

## 2020-12-07 NOTE — TELEPHONE ENCOUNTER
All info faxed to Dr. Eloise Eckert @ [id number] [facility] [phone]  [phone].  Pt informed

## 2020-12-22 LAB
ALBUMIN SERPL-MCNC: NORMAL G/DL
ALP BLD-CCNC: NORMAL U/L
ALT SERPL-CCNC: NORMAL U/L
ANION GAP SERPL CALCULATED.3IONS-SCNC: NORMAL MMOL/L
AST SERPL-CCNC: NORMAL U/L
AVERAGE GLUCOSE: NORMAL
BILIRUB SERPL-MCNC: NORMAL MG/DL
BUN BLDV-MCNC: NORMAL MG/DL
CALCIUM SERPL-MCNC: NORMAL MG/DL
CHLORIDE BLD-SCNC: NORMAL MMOL/L
CHOLESTEROL, TOTAL: 179 MG/DL
CHOLESTEROL/HDL RATIO: NORMAL
CO2: NORMAL
CREAT SERPL-MCNC: 1.07 MG/DL
GFR CALCULATED: NORMAL
GLUCOSE BLD-MCNC: NORMAL MG/DL
HBA1C MFR BLD: 10.1 %
HDLC SERPL-MCNC: 39 MG/DL (ref 35–70)
LDL CHOLESTEROL CALCULATED: 91 MG/DL (ref 0–160)
NONHDLC SERPL-MCNC: NORMAL MG/DL
POTASSIUM SERPL-SCNC: 4.1 MMOL/L
SODIUM BLD-SCNC: NORMAL MMOL/L
TOTAL PROTEIN: NORMAL
TRIGL SERPL-MCNC: 295 MG/DL
VLDLC SERPL CALC-MCNC: 49 MG/DL

## 2021-01-05 ENCOUNTER — TELEPHONE (OUTPATIENT)
Dept: FAMILY MEDICINE CLINIC | Age: 51
End: 2021-01-05

## 2021-01-05 RX ORDER — OSELTAMIVIR PHOSPHATE 75 MG/1
75 CAPSULE ORAL 2 TIMES DAILY
Qty: 10 CAPSULE | Refills: 0 | Status: SHIPPED | OUTPATIENT
Start: 2021-01-05 | End: 2021-01-07

## 2021-01-05 NOTE — TELEPHONE ENCOUNTER
Pt states he has been feeling really weak, tired and a cough that started yesterday around noon. He went to a walk in today and was tested for Covid which was negative. He was also tested for influenza and he came back positive for influenza B. Pt questions if there is anything he can take to help with the symptoms? Pt uses [facility] in [place]. Health Maintenance   Topic Date Due    Hepatitis C screen  1970    Hepatitis B vaccine (1 of 3 - Risk 3-dose series) 04/15/1990    Diabetic retinal exam  10/08/2016    A1C test (Diabetic or Prediabetic)  03/22/2021    Diabetic foot exam  10/09/2021    Diabetic microalbuminuria test  10/09/2021    Annual Wellness Visit (AWV)  10/28/2021    Lipid screen  12/22/2021    Potassium monitoring  12/22/2021    Creatinine monitoring  12/22/2021    DTaP/Tdap/Td vaccine (2 - Td) 11/19/2022    Flu vaccine  Completed    Pneumococcal 0-64 years Vaccine  Completed    HIV screen  Completed    Hepatitis A vaccine  Aged Out    Hib vaccine  Aged Out    Meningococcal (ACWY) vaccine  Aged Out             (applicable per patient's age: Cancer Screenings, Depression Screening, Fall Risk Screening, Immunizations)    Hemoglobin A1C (%)   Date Value   12/22/2020 10.1   10/09/2020 10.6   05/08/2020 8.2     Microalb/Crt.  Ratio (mcg/mg creat)   Date Value   10/09/2020 24 (H)     LDL Cholesterol (mg/dL)   Date Value   10/09/2020 85     LDL Calculated (mg/dL)   Date Value   12/22/2020 91     AST (U/L)   Date Value   10/09/2020 47 (H)     ALT (U/L)   Date Value   10/09/2020 74 (H)     BUN (mg/dL)   Date Value   10/09/2020 17      (goal A1C is < 7)   (goal LDL is <100) need 30-50% reduction from baseline     BP Readings from Last 3 Encounters:   11/09/20 106/62   10/27/20 136/75   10/09/20 130/74    (goal /80)      All Future Testing planned in [facility]:      Next Visit Date:  Future Appointments   Date Time Provider Dwaine Pal   1/7/2021  8:45 AM Werner Sánchez MD [facility]   4/12/2021 10:00 AM Darrian Longoria MD [facility]   10/28/2021  2:45 PM Darrian Longoria MD [facility] [address] [facility]            Patient Active Problem List:     GERD (gastroesophageal reflux disease)     Carpal tunnel syndrome of right wrist     Benign essential HTN     Diabetic peripheral neuropathy (HCC)     Mixed hyperlipidemia     Vitamin D deficiency     Leg pain, left     Microalbuminuria     PVD (peripheral vascular disease) ([address]     Status post partial resection of colon     Cigarette smoker     Left carotid artery stenosis     Pain in upper limb     Situational mixed anxiety and depressive disorder     History of coronary artery bypass graft     H/O heart artery stent     History of DVT (deep vein thrombosis)

## 2021-01-07 ENCOUNTER — OFFICE VISIT (OUTPATIENT)
Dept: FAMILY MEDICINE CLINIC | Age: 51
End: 2021-01-07
Payer: MEDICARE

## 2021-01-07 VITALS
BODY MASS INDEX: 27.99 KG/M2 | HEART RATE: 80 BPM | DIASTOLIC BLOOD PRESSURE: 86 MMHG | TEMPERATURE: 97.5 F | SYSTOLIC BLOOD PRESSURE: 124 MMHG | HEIGHT: 69 IN | WEIGHT: 189 LBS

## 2021-01-07 DIAGNOSIS — E11.65 UNCONTROLLED TYPE 2 DIABETES MELLITUS WITH HYPERGLYCEMIA (HCC): Primary | ICD-10-CM

## 2021-01-07 DIAGNOSIS — E78.2 MIXED HYPERLIPIDEMIA: ICD-10-CM

## 2021-01-07 DIAGNOSIS — R80.9 MICROALBUMINURIA: ICD-10-CM

## 2021-01-07 DIAGNOSIS — I10 BENIGN ESSENTIAL HTN: ICD-10-CM

## 2021-01-07 DIAGNOSIS — E55.9 VITAMIN D DEFICIENCY: ICD-10-CM

## 2021-01-07 DIAGNOSIS — F41.8 DEPRESSION WITH ANXIETY: ICD-10-CM

## 2021-01-07 DIAGNOSIS — E11.42 DIABETIC PERIPHERAL NEUROPATHY (HCC): ICD-10-CM

## 2021-01-07 DIAGNOSIS — F17.210 CIGARETTE SMOKER: ICD-10-CM

## 2021-01-07 PROCEDURE — 99214 OFFICE O/P EST MOD 30 MIN: CPT | Performed by: INTERNAL MEDICINE

## 2021-01-07 RX ORDER — INSULIN DETEMIR 100 [IU]/ML
80 INJECTION, SOLUTION SUBCUTANEOUS NIGHTLY
COMMUNITY
End: 2021-01-07 | Stop reason: SDUPTHER

## 2021-01-07 RX ORDER — INSULIN DETEMIR 100 [IU]/ML
50 INJECTION, SOLUTION SUBCUTANEOUS 2 TIMES DAILY
Qty: 5 PEN | Refills: 3
Start: 2021-01-07 | End: 2021-01-15 | Stop reason: DRUGHIGH

## 2021-01-07 ASSESSMENT — ENCOUNTER SYMPTOMS
SHORTNESS OF BREATH: 0
CONSTIPATION: 0
SORE THROAT: 0
ABDOMINAL PAIN: 0
RESPIRATORY NEGATIVE: 1
BLOOD IN STOOL: 0
BACK PAIN: 1
NAUSEA: 0
DIARRHEA: 0

## 2021-01-07 NOTE — PATIENT INSTRUCTIONS
Survey: You may be receiving a survey from [employer] regarding your visit today. You may get this in the mail, through your MyChart or in your email. Please complete the survey to enable us to provide the highest quality of care to you and your family. Please also, mention our names. If you cannot score us as very good (5 Stars) on any question, please feel free to call the office to discuss how we could have made your experience exceptional.      Thank You! MD Casie Godoy, [address] [place] [address], [address] [place] [address], [facility]    Katharine Said, HAO      1. Uncontrolled type 2 diabetes mellitus with hyperglycemia (HCC)  Increase Levemir to 50 units two times a day. Call weekly with am BS readings. - insulin detemir (LEVEMIR FLEXTOUCH) 100 UNIT/ML injection pen; Inject 50 Units into the skin 2 times daily  Dispense: 5 pen; Refill: 3    2. Benign essential HTN  Controlled on the current medication. 3. Diabetic peripheral neuropathy ([address]      4. Vitamin D deficiency  Continues on 2000 units daily. 5. Microalbuminuria  On an ARB. 6. Mixed hyperlipidemia  LDL 91 with diet modification. 7. Cigarette smoker  Continue to work on stop smoking. 8. Depression with anxiety  Fluctuates on the current medication. Obtain labs approximately one week prior to the office appointment. Please fast for 12 hours prior to obtaining the labs. Water or black coffee (no cream or sugar) is allowed prior to the the labs. Iralnda Ramsay was instructed to follow up in the clinic in 3 months for check up or as needed with any medical issues.

## 2021-01-07 NOTE — PROGRESS NOTES
Subjective:      Patient ID: Madelin Salinas is a 53 y.o. male. Donny Nielsen presents for a check up on his medical conditions DM II, HTN, GERD, CKD. Preston admits to new problems (recently diagnosed with influenza B). Medications were reviewed with Donny Nielsen, he is  tolerating the medication. Bowels are not regular. There has not been rectal bleeding. Donny Nielsen denies urinary complications, the urine stream is good. Preston denies chest pain and denies increasing shortness of breath. Labs from  reviewed. Preston states his diet has not been very good and not watch a diabetic diet.    Past Medical History:  No date: GERD (gastroesophageal reflux disease)  No date: Hyperlipidemia  No date: Hypertension  No date: Microalbuminuria  No date: PAD (peripheral artery disease) (HCC)    Past Surgical History:  No date: APPENDECTOMY  2012: ARTERIAL BYPASS SURGRY  2006: CARDIAC SURGERY  2019: COLECTOMY      Comment:  sigmoid resection ([facility])  2020: HERNIA REPAIR      Comment:  Dr Venessa Florentino  2019: SMALL INTESTINE SURGERY      Comment:  small bowel resection ([facility])    Social History    Socioeconomic History      Marital status:       Spouse name: Not on file      Number of children: Not on file      Years of education: Not on file      Highest education level: Not on file    Occupational History      Occupation: disability    Social Needs      Financial resource strain: Not very hard      Food insecurity        Worry: Never true        Inability: Never true      Transportation needs        Medical: No        Non-medical: No    Tobacco Use      Smoking status: Current Some Day Smoker        Types: Cigarettes        Quit date: 10/27/2011        Years since quittin.2      Smokeless tobacco: Never Used      Tobacco comment: wife smokes, passive    Substance and Sexual Activity      Alcohol use: Not on file      Drug use: Not on file      Sexual activity: Not on file    Lifestyle      Physical activity DAILY, Disp: 60 tablet, Rfl: 2    magnesium oxide (MAG-OX) 400 (241.3 Mg) MG TABS tablet, TAKE 1 TABLET BY MOUTH DAILY, Disp: , Rfl:     metoclopramide (REGLAN) 10 MG tablet, Take 10 mg by mouth, Disp: , Rfl:     hyoscyamine (ANASPAZ;LEVSIN) 125 MCG tablet, Take 125 mcg by mouth every 4 hours as needed for Cramping, Disp: , Rfl:     loperamide (IMODIUM) 2 MG capsule, Take 2 mg by mouth 4 times daily as needed for Diarrhea, Disp: , Rfl:     busPIRone (BUSPAR) 10 MG tablet, TAKE 1 TABLET BY MOUTH TWICE DAILY, Disp: 60 tablet, Rfl: 1    aspirin 81 MG EC tablet, Take 81 mg by mouth daily. , Disp: , Rfl:     Drug [place] Unilet Lancets 28G MISC, Test TID and prn. DX E11.9, Z79.4, Disp: 100 each, Rfl: 5    blood glucose monitor strips, 1 strip by Other route 3 times daily as needed for Other (as needed) Dx E11.9, Z79.4 [facility] Unilet Test strips, Disp: 100 strip, Rfl: 5    Insulin Syringe-Needle U-100 30G X 1/2\" 0.5 ML MISC, Dx E11.9, Z79.4 injecting insulin QD, Disp: 100 each, Rfl: 3    TRUE METRIX BLOOD GLUCOSE TEST strip, Test BS TID and prn., Disp: 50 each, Rfl: 11    No current facility-administered medications on file prior to visit.         -- Oxycodone-Acetaminophen     --  Other reaction(s): Mental Status Change      Lab Results       Component                Value               Date                       NA                       134 (L)             10/09/2020                 K                        4.1                 12/22/2020                 CL                       98                  10/09/2020                 CO2                      21                  10/09/2020                 BUN                      17                  10/09/2020                 CREATININE               1.07                12/22/2020                 GLUCOSE                  199 (H)             10/09/2020                 CALCIUM                  10.0                10/09/2020                 PROT                     7.0 10/09/2020                 LABALBU                  4.8                 10/09/2020                 BILITOT                  0.45                10/09/2020                 ALKPHOS                  105                 10/09/2020                 AST                      47 (H)              10/09/2020                 ALT                      74 (H)              10/09/2020                 LABGLOM                  >60                 10/09/2020                 GFRAA                    >60                 10/09/2020              Lab Results       Component                Value               Date                       LABA1C                   10.1                12/22/2020            Lab Results       Component                Value               Date                       EAG                      203                 01/14/2019              Lab Results       Component                Value               Date                       CHOL                     179                 12/22/2020                 CHOL                     198                 10/09/2020                 CHOL                     180                 05/08/2020            Lab Results       Component                Value               Date                       TRIG                     295                 12/22/2020                 TRIG                     388 (H)             10/09/2020                 TRIG                     269                 05/08/2020            Lab Results       Component                Value               Date                       HDL                      39                  12/22/2020                 HDL                      35 (L)              10/09/2020                 HDL                      33 (A)              05/08/2020            Lab Results       Component                Value               Date                       LDLCHOLESTEROL           85                  10/09/2020                 LDLCHOLESTEROL           139 (H)             01/14/2019                 LDLCHOLESTEROL           129                 10/08/2018                 LDLCALC                  91                  12/22/2020                 LDLCALC                  93                  05/08/2020                 LDLCALC                  91                  03/28/2018            Lab Results       Component                Value               Date                       VLDL                     49                  12/22/2020                 VLDL                                         10/09/2020             NOT REPORTED (H)       VLDL                     54                  05/08/2020            Lab Results       Component                Value               Date                       CHOLHDLRATIO             5.7 (H)             10/09/2020                 CHOLHDLRATIO             6.3 (H)             01/14/2019                 CHOLHDLRATIO             5.1 (H)             10/08/2018                              Diabetes  He presents for his follow-up diabetic visit. He has type 2 diabetes mellitus. His disease course has been fluctuating. There are no hypoglycemic associated symptoms. Pertinent negatives for diabetes include no chest pain. There are no hypoglycemic complications. Symptoms are worsening. Risk factors for coronary artery disease include diabetes mellitus, dyslipidemia, hypertension and male sex. Current diabetic treatment includes insulin injections. He is compliant with treatment most of the time. His weight is stable. There is no change in his home blood glucose trend. An ACE inhibitor/angiotensin II receptor blocker is being taken. Eye exam is not current. Hypertension  This is a chronic problem. The current episode started more than 1 year ago. The problem is unchanged. The problem is controlled. Pertinent negatives include no chest pain, neck pain, palpitations or shortness of breath.  Past treatments include alpha 1 blockers, calcium channel blockers and angiotensin blockers. The current treatment provides significant improvement. There are no compliance problems. Gastroesophageal Reflux  He reports no abdominal pain, no chest pain, no nausea or no sore throat. This is a chronic problem. The current episode started more than 1 year ago. The problem occurs rarely. The problem has been unchanged. He has tried a PPI for the symptoms. The treatment provided significant relief. Review of Systems   Constitutional: Negative. HENT: Negative for congestion, ear pain, postnasal drip, sneezing and sore throat. Eyes: Negative for visual disturbance. Respiratory: Negative. Negative for shortness of breath. Cardiovascular: Negative for chest pain, palpitations and leg swelling. Gastrointestinal: Negative for abdominal pain, blood in stool, constipation, diarrhea and nausea. Genitourinary: Negative for difficulty urinating, dysuria, frequency and urgency. Musculoskeletal: Positive for back pain. Negative for arthralgias, joint swelling, myalgias, neck pain and neck stiffness. Skin: Negative. Neurological: Negative for syncope. Psychiatric/Behavioral: Negative. Objective:   Physical Exam  Vitals signs and nursing note reviewed. Constitutional:       Appearance: He is well-developed. HENT:      Head: Atraumatic. Eyes:      Conjunctiva/sclera: Conjunctivae normal.   Neck:      Musculoskeletal: Normal range of motion and neck supple. Cardiovascular:      Rate and Rhythm: Normal rate and regular rhythm. Heart sounds: Normal heart sounds. Pulmonary:      Effort: Pulmonary effort is normal.      Breath sounds: Normal breath sounds. Abdominal:      Palpations: Abdomen is soft. Tenderness: There is no abdominal tenderness. Lymphadenopathy:      Cervical: No cervical adenopathy. Skin:     Findings: No rash. Neurological:      Mental Status: He is alert.    Psychiatric:         Behavior: Behavior normal.         Thought Content: Thought content normal.         Assessment:       Diagnosis Orders   1. Uncontrolled type 2 diabetes mellitus with hyperglycemia (HCC)  insulin detemir (LEVEMIR FLEXTOUCH) 100 UNIT/ML injection pen    Comprehensive Metabolic Panel    Hemoglobin A1C   2. Benign essential HTN  Comprehensive Metabolic Panel   3. Diabetic peripheral neuropathy ([address]     4. Vitamin D deficiency     5. Microalbuminuria     6. Mixed hyperlipidemia  Comprehensive Metabolic Panel    Lipid Panel   7. Cigarette smoker     8. Depression with anxiety             Plan:      1. Uncontrolled type 2 diabetes mellitus with hyperglycemia (HCC)  Increase Levemir to 50 units two times a day. Call weekly with am BS readings. - insulin detemir (LEVEMIR FLEXTOUCH) 100 UNIT/ML injection pen; Inject 50 Units into the skin 2 times daily  Dispense: 5 pen; Refill: 3    2. Benign essential HTN  Controlled on the current medication. 3. Diabetic peripheral neuropathy ([address]      4. Vitamin D deficiency  Continues on 2000 units daily. 5. Microalbuminuria  On an ARB. 6. Mixed hyperlipidemia  LDL 91 with diet modification. 7. Cigarette smoker  Continue to work on stop smoking. 8. Depression with anxiety  Fluctuates on the current medication. Obtain labs approximately one week prior to the office appointment. Please fast for 12 hours prior to obtaining the labs. Water or black coffee (no cream or sugar) is allowed prior to the the labs. Jim Carnes was instructed to follow up in the clinic in 3 months for check up or as needed with any medical issues.                     Queenie Beach MD

## 2021-01-15 ENCOUNTER — TELEPHONE (OUTPATIENT)
Dept: FAMILY MEDICINE CLINIC | Age: 51
End: 2021-01-15

## 2021-01-15 DIAGNOSIS — E11.65 UNCONTROLLED TYPE 2 DIABETES MELLITUS WITH HYPERGLYCEMIA (HCC): ICD-10-CM

## 2021-01-15 RX ORDER — INSULIN DETEMIR 100 [IU]/ML
53 INJECTION, SOLUTION SUBCUTANEOUS 2 TIMES DAILY
Qty: 5 PEN | Refills: 3 | Status: SHIPPED
Start: 2021-01-15 | End: 2021-07-01

## 2021-01-15 NOTE — TELEPHONE ENCOUNTER
Pt called in with his last week of BS readings. Pt states he takes 50 units am & pm. BS readings 166, 178, 199, 180, 186. Health Maintenance   Topic Date Due    Hepatitis C screen  1970    Hepatitis B vaccine (1 of 3 - Risk 3-dose series) 04/15/1990    Diabetic retinal exam  10/08/2016    A1C test (Diabetic or Prediabetic)  03/22/2021    Diabetic foot exam  10/09/2021    Diabetic microalbuminuria test  10/09/2021    Annual Wellness Visit (AWV)  10/28/2021    Lipid screen  12/22/2021    Potassium monitoring  12/22/2021    Creatinine monitoring  12/22/2021    DTaP/Tdap/Td vaccine (2 - Td) 11/19/2022    Flu vaccine  Completed    Pneumococcal 0-64 years Vaccine  Completed    HIV screen  Completed    Hepatitis A vaccine  Aged Out    Hib vaccine  Aged Out    Meningococcal (ACWY) vaccine  Aged Out             (applicable per patient's age: Cancer Screenings, Depression Screening, Fall Risk Screening, Immunizations)    Hemoglobin A1C (%)   Date Value   12/22/2020 10.1   10/09/2020 10.6   05/08/2020 8.2     Microalb/Crt.  Ratio (mcg/mg creat)   Date Value   10/09/2020 24 (H)     LDL Cholesterol (mg/dL)   Date Value   10/09/2020 85     LDL Calculated (mg/dL)   Date Value   12/22/2020 91     AST (U/L)   Date Value   10/09/2020 47 (H)     ALT (U/L)   Date Value   10/09/2020 74 (H)     BUN (mg/dL)   Date Value   10/09/2020 17      (goal A1C is < 7)   (goal LDL is <100) need 30-50% reduction from baseline     BP Readings from Last 3 Encounters:   01/07/21 124/86   11/09/20 106/62   10/27/20 136/75    (goal /80)      All Future Testing planned in [facility]:  Lab Frequency Next Occurrence   Comprehensive Metabolic Panel Once 15/30/8778   Hemoglobin A1C Once 04/07/2021   Lipid Panel Once 04/07/2021       Next Visit Date:  Future Appointments   Date Time Provider Dwaine Pal   4/12/2021 10:00 AM Werner Sánchez MD [facility]   10/28/2021  2:45 PM Werner Robe, MD [facility]

## 2021-01-18 DIAGNOSIS — I10 BENIGN ESSENTIAL HTN: ICD-10-CM

## 2021-01-18 RX ORDER — AMLODIPINE BESYLATE 5 MG/1
TABLET ORAL
Qty: 30 TABLET | Refills: 2 | Status: SHIPPED | OUTPATIENT
Start: 2021-01-18 | End: 2021-05-20

## 2021-01-18 RX ORDER — CILOSTAZOL 100 MG/1
TABLET ORAL
Qty: 60 TABLET | Refills: 2 | Status: SHIPPED | OUTPATIENT
Start: 2021-01-18 | End: 2021-05-20

## 2021-01-18 NOTE — TELEPHONE ENCOUNTER
Health Maintenance   Topic Date Due    Hepatitis C screen  1970    Hepatitis B vaccine (1 of 3 - Risk 3-dose series) 04/15/1990    Diabetic retinal exam  10/08/2016    A1C test (Diabetic or Prediabetic)  03/22/2021    Diabetic foot exam  10/09/2021    Diabetic microalbuminuria test  10/09/2021    Annual Wellness Visit (AWV)  10/28/2021    Lipid screen  12/22/2021    Potassium monitoring  12/22/2021    Creatinine monitoring  12/22/2021    DTaP/Tdap/Td vaccine (2 - Td) 11/19/2022    Flu vaccine  Completed    Pneumococcal 0-64 years Vaccine  Completed    HIV screen  Completed    Hepatitis A vaccine  Aged Out    Hib vaccine  Aged Out    Meningococcal (ACWY) vaccine  Aged Out             (applicable per patient's age: Cancer Screenings, Depression Screening, Fall Risk Screening, Immunizations)    Hemoglobin A1C (%)   Date Value   12/22/2020 10.1   10/09/2020 10.6   05/08/2020 8.2     Microalb/Crt.  Ratio (mcg/mg creat)   Date Value   10/09/2020 24 (H)     LDL Cholesterol (mg/dL)   Date Value   10/09/2020 85     LDL Calculated (mg/dL)   Date Value   12/22/2020 91     AST (U/L)   Date Value   10/09/2020 47 (H)     ALT (U/L)   Date Value   10/09/2020 74 (H)     BUN (mg/dL)   Date Value   10/09/2020 17      (goal A1C is < 7)   (goal LDL is <100) need 30-50% reduction from baseline     BP Readings from Last 3 Encounters:   01/07/21 124/86   11/09/20 106/62   10/27/20 136/75    (goal /80)      All Future Testing planned in [facility]:  Lab Frequency Next Occurrence   Comprehensive Metabolic Panel Once 29/40/6377   Hemoglobin A1C Once 04/07/2021   Lipid Panel Once 04/07/2021       Next Visit Date:  Future Appointments   Date Time Provider Dwaine Pal   4/12/2021 10:00 AM MD Blaine Longo [facility]   10/28/2021  2:45 PM Teetee Duran MD [facility]            Patient Active Problem List:     GERD (gastroesophageal reflux disease)     Carpal tunnel syndrome of right wrist     Benign essential HTN     Diabetic peripheral neuropathy (HCC)     Mixed hyperlipidemia     Vitamin D deficiency     Leg pain, left     Microalbuminuria     PVD (peripheral vascular disease) (HCC)     Status post partial resection of colon     Cigarette smoker     Left carotid artery stenosis     Pain in upper limb     Situational mixed anxiety and depressive disorder     History of coronary artery bypass graft     H/O heart artery stent     History of DVT (deep vein thrombosis)

## 2021-02-16 DIAGNOSIS — I10 BENIGN ESSENTIAL HTN: ICD-10-CM

## 2021-02-16 DIAGNOSIS — Z79.4 TYPE 2 DIABETES MELLITUS WITH DIABETIC POLYNEUROPATHY, WITH LONG-TERM CURRENT USE OF INSULIN (HCC): ICD-10-CM

## 2021-02-16 DIAGNOSIS — F41.1 GENERALIZED ANXIETY DISORDER: ICD-10-CM

## 2021-02-16 DIAGNOSIS — E11.42 TYPE 2 DIABETES MELLITUS WITH DIABETIC POLYNEUROPATHY, WITH LONG-TERM CURRENT USE OF INSULIN (HCC): ICD-10-CM

## 2021-02-17 RX ORDER — HYDROXYZINE HYDROCHLORIDE 25 MG/1
TABLET, FILM COATED ORAL
Qty: 90 TABLET | Refills: 1 | Status: SHIPPED | OUTPATIENT
Start: 2021-02-17 | End: 2021-04-26

## 2021-02-17 RX ORDER — METOPROLOL TARTRATE 50 MG/1
TABLET, FILM COATED ORAL
Qty: 60 TABLET | Refills: 5 | Status: SHIPPED | OUTPATIENT
Start: 2021-02-17 | End: 2021-10-21

## 2021-02-17 RX ORDER — LOSARTAN POTASSIUM 100 MG/1
TABLET ORAL
Qty: 30 TABLET | Refills: 5 | Status: SHIPPED | OUTPATIENT
Start: 2021-02-17 | End: 2021-09-20 | Stop reason: SDUPTHER

## 2021-02-17 NOTE — TELEPHONE ENCOUNTER
Health Maintenance   Topic Date Due    Hepatitis C screen  1970    Hepatitis B vaccine (1 of 3 - Risk 3-dose series) 04/15/1990    Diabetic retinal exam  10/08/2016    A1C test (Diabetic or Prediabetic)  03/22/2021    Diabetic foot exam  10/09/2021    Diabetic microalbuminuria test  10/09/2021    Annual Wellness Visit (AWV)  10/28/2021    Lipid screen  12/22/2021    Potassium monitoring  12/22/2021    Creatinine monitoring  12/22/2021    DTaP/Tdap/Td vaccine (2 - Td) 11/19/2022    Flu vaccine  Completed    Pneumococcal 0-64 years Vaccine  Completed    HIV screen  Completed    Hepatitis A vaccine  Aged Out    Hib vaccine  Aged Out    Meningococcal (ACWY) vaccine  Aged Out             (applicable per patient's age: Cancer Screenings, Depression Screening, Fall Risk Screening, Immunizations)    Hemoglobin A1C (%)   Date Value   12/22/2020 10.1   10/09/2020 10.6   05/08/2020 8.2     Microalb/Crt.  Ratio (mcg/mg creat)   Date Value   10/09/2020 24 (H)     LDL Cholesterol (mg/dL)   Date Value   10/09/2020 85     LDL Calculated (mg/dL)   Date Value   12/22/2020 91     AST (U/L)   Date Value   10/09/2020 47 (H)     ALT (U/L)   Date Value   10/09/2020 74 (H)     BUN (mg/dL)   Date Value   10/09/2020 17      (goal A1C is < 7)   (goal LDL is <100) need 30-50% reduction from baseline     BP Readings from Last 3 Encounters:   01/07/21 124/86   11/09/20 106/62   10/27/20 136/75    (goal /80)      All Future Testing planned in [facility]:  Lab Frequency Next Occurrence   Comprehensive Metabolic Panel Once 42/61/3898   Hemoglobin A1C Once 04/07/2021   Lipid Panel Once 04/07/2021       Next Visit Date:  Future Appointments   Date Time Provider Dwaine Pal   4/12/2021 10:00 AM Monica Ibarra MD [facility]   10/28/2021  2:45 PM Monica Ibarra MD [facility]            Patient Active Problem List:     GERD (gastroesophageal reflux disease)     Carpal tunnel syndrome of right wrist     Benign

## 2021-03-01 ENCOUNTER — TELEPHONE (OUTPATIENT)
Dept: FAMILY MEDICINE CLINIC | Age: 51
End: 2021-03-01

## 2021-03-01 DIAGNOSIS — M54.50 CHRONIC MIDLINE LOW BACK PAIN WITHOUT SCIATICA: ICD-10-CM

## 2021-03-01 DIAGNOSIS — G89.29 CHRONIC MIDLINE LOW BACK PAIN WITHOUT SCIATICA: ICD-10-CM

## 2021-03-01 RX ORDER — CYCLOBENZAPRINE HCL 10 MG
10 TABLET ORAL 3 TIMES DAILY PRN
Qty: 30 TABLET | Refills: 0 | Status: SHIPPED | OUTPATIENT
Start: 2021-03-01 | End: 2021-04-12 | Stop reason: SDUPTHER

## 2021-03-01 RX ORDER — ACETAMINOPHEN AND CODEINE PHOSPHATE 300; 30 MG/1; MG/1
1 TABLET ORAL EVERY 6 HOURS PRN
Qty: 28 TABLET | Refills: 0 | Status: SHIPPED | OUTPATIENT
Start: 2021-03-01 | End: 2021-03-08

## 2021-03-01 NOTE — TELEPHONE ENCOUNTER
Pt called stating he seen Dr. Alylssa Mendoza and he is waiting to receive the test imaging for patient before he will prescribe rx. Pt is questioning if you can refill his Tylenol 3 and a muscle relaxer. Pt states has been miserable for a few weeks now. I ask patient if he has a medication contract with Dr. Allyssa Mendoza and he stated he does not have one with him at this time. Health Maintenance   Topic Date Due    Hepatitis C screen  1970    Hepatitis B vaccine (1 of 3 - Risk 3-dose series) 04/15/1990    Diabetic retinal exam  10/08/2016    A1C test (Diabetic or Prediabetic)  03/22/2021    Diabetic foot exam  10/09/2021    Diabetic microalbuminuria test  10/09/2021    Annual Wellness Visit (AWV)  10/28/2021    Lipid screen  12/22/2021    Potassium monitoring  12/22/2021    Creatinine monitoring  12/22/2021    DTaP/Tdap/Td vaccine (2 - Td) 11/19/2022    Flu vaccine  Completed    Pneumococcal 0-64 years Vaccine  Completed    HIV screen  Completed    Hepatitis A vaccine  Aged Out    Hib vaccine  Aged Out    Meningococcal (ACWY) vaccine  Aged Out             (applicable per patient's age: Cancer Screenings, Depression Screening, Fall Risk Screening, Immunizations)    Hemoglobin A1C (%)   Date Value   12/22/2020 10.1   10/09/2020 10.6   05/08/2020 8.2     Microalb/Crt.  Ratio (mcg/mg creat)   Date Value   10/09/2020 24 (H)     LDL Cholesterol (mg/dL)   Date Value   10/09/2020 85     LDL Calculated (mg/dL)   Date Value   12/22/2020 91     AST (U/L)   Date Value   10/09/2020 47 (H)     ALT (U/L)   Date Value   10/09/2020 74 (H)     BUN (mg/dL)   Date Value   10/09/2020 17      (goal A1C is < 7)   (goal LDL is <100) need 30-50% reduction from baseline     BP Readings from Last 3 Encounters:   01/07/21 124/86   11/09/20 106/62   10/27/20 136/75    (goal /80)      All Future Testing planned in [facility]:  Lab Frequency Next Occurrence   Comprehensive Metabolic Panel Once 79/15/9836   Hemoglobin A1C Once 04/07/2021   Lipid Panel Once 04/07/2021       Next Visit Date:  Future Appointments   Date Time Provider Dwaine Kae   4/12/2021 10:00 AM Lamar Couch MD [facility]   10/28/2021  2:45 PM MD Suyapa Payne Nurse [facility]            Patient Active Problem List:     GERD (gastroesophageal reflux disease)     Carpal tunnel syndrome of right wrist     Benign essential HTN     Diabetic peripheral neuropathy (HCC)     Mixed hyperlipidemia     Vitamin D deficiency     Leg pain, left     Microalbuminuria     PVD (peripheral vascular disease) ([facility] [address]     Status post partial resection of colon     Cigarette smoker     Left carotid artery stenosis     Pain in upper limb     Situational mixed anxiety and depressive disorder     History of coronary artery bypass graft     H/O heart artery stent     History of DVT (deep vein thrombosis)

## 2021-04-09 DIAGNOSIS — E78.2 MIXED HYPERLIPIDEMIA: ICD-10-CM

## 2021-04-09 DIAGNOSIS — I10 BENIGN ESSENTIAL HTN: ICD-10-CM

## 2021-04-09 DIAGNOSIS — E11.65 UNCONTROLLED TYPE 2 DIABETES MELLITUS WITH HYPERGLYCEMIA (HCC): ICD-10-CM

## 2021-04-09 LAB
ALBUMIN SERPL-MCNC: NORMAL G/DL
ALP BLD-CCNC: NORMAL U/L
ALT SERPL-CCNC: NORMAL U/L
ANION GAP SERPL CALCULATED.3IONS-SCNC: NORMAL MMOL/L
AST SERPL-CCNC: NORMAL U/L
AVERAGE GLUCOSE: 232
BILIRUB SERPL-MCNC: NORMAL MG/DL
BUN BLDV-MCNC: NORMAL MG/DL
CALCIUM SERPL-MCNC: NORMAL MG/DL
CHLORIDE BLD-SCNC: NORMAL MMOL/L
CHOLESTEROL, TOTAL: 197 MG/DL
CHOLESTEROL/HDL RATIO: 6
CO2: NORMAL
CREAT SERPL-MCNC: 1.08 MG/DL
GFR CALCULATED: NORMAL
GLUCOSE BLD-MCNC: NORMAL MG/DL
HBA1C MFR BLD: 9.7 %
HDLC SERPL-MCNC: 33 MG/DL (ref 35–70)
LDL CHOLESTEROL CALCULATED: 91 MG/DL (ref 0–160)
NONHDLC SERPL-MCNC: 164 MG/DL
POTASSIUM SERPL-SCNC: 4.1 MMOL/L
SODIUM BLD-SCNC: NORMAL MMOL/L
TOTAL PROTEIN: NORMAL
TRIGL SERPL-MCNC: 367 MG/DL
VLDLC SERPL CALC-MCNC: ABNORMAL MG/DL

## 2021-04-12 ENCOUNTER — OFFICE VISIT (OUTPATIENT)
Dept: FAMILY MEDICINE CLINIC | Age: 51
End: 2021-04-12
Payer: MEDICARE

## 2021-04-12 VITALS
HEART RATE: 98 BPM | SYSTOLIC BLOOD PRESSURE: 118 MMHG | DIASTOLIC BLOOD PRESSURE: 76 MMHG | WEIGHT: 193 LBS | HEIGHT: 69 IN | BODY MASS INDEX: 28.58 KG/M2

## 2021-04-12 DIAGNOSIS — E78.2 MIXED HYPERLIPIDEMIA: ICD-10-CM

## 2021-04-12 DIAGNOSIS — K21.9 GASTROESOPHAGEAL REFLUX DISEASE WITHOUT ESOPHAGITIS: ICD-10-CM

## 2021-04-12 DIAGNOSIS — G89.29 CHRONIC MIDLINE LOW BACK PAIN WITHOUT SCIATICA: ICD-10-CM

## 2021-04-12 DIAGNOSIS — E11.65 UNCONTROLLED TYPE 2 DIABETES MELLITUS WITH HYPERGLYCEMIA (HCC): Primary | ICD-10-CM

## 2021-04-12 DIAGNOSIS — E55.9 VITAMIN D DEFICIENCY: ICD-10-CM

## 2021-04-12 DIAGNOSIS — R80.9 MICROALBUMINURIA: ICD-10-CM

## 2021-04-12 DIAGNOSIS — M54.50 CHRONIC MIDLINE LOW BACK PAIN WITHOUT SCIATICA: ICD-10-CM

## 2021-04-12 DIAGNOSIS — F17.210 CIGARETTE SMOKER: ICD-10-CM

## 2021-04-12 DIAGNOSIS — I10 BENIGN ESSENTIAL HTN: ICD-10-CM

## 2021-04-12 PROCEDURE — 99214 OFFICE O/P EST MOD 30 MIN: CPT | Performed by: INTERNAL MEDICINE

## 2021-04-12 RX ORDER — ACETAMINOPHEN AND CODEINE PHOSPHATE 300; 30 MG/1; MG/1
TABLET ORAL
COMMUNITY
Start: 2021-04-08 | End: 2021-08-20 | Stop reason: SDUPTHER

## 2021-04-12 RX ORDER — INSULIN LISPRO 100 [IU]/ML
3 INJECTION, SOLUTION INTRAVENOUS; SUBCUTANEOUS
Qty: 5 PEN | Refills: 3 | Status: SHIPPED | OUTPATIENT
Start: 2021-04-12 | End: 2022-04-29 | Stop reason: SDUPTHER

## 2021-04-12 RX ORDER — AMOXICILLIN 500 MG/1
CAPSULE ORAL
COMMUNITY
Start: 2021-04-08 | End: 2021-09-21

## 2021-04-12 RX ORDER — CYCLOBENZAPRINE HCL 10 MG
10 TABLET ORAL 3 TIMES DAILY PRN
Qty: 30 TABLET | Refills: 0 | Status: SHIPPED | OUTPATIENT
Start: 2021-04-12 | End: 2021-05-18

## 2021-04-12 ASSESSMENT — ENCOUNTER SYMPTOMS
BLOOD IN STOOL: 0
DIARRHEA: 0
RESPIRATORY NEGATIVE: 1
BACK PAIN: 1
NAUSEA: 0
CONSTIPATION: 0
SHORTNESS OF BREATH: 0
SORE THROAT: 0
ABDOMINAL PAIN: 0

## 2021-04-12 NOTE — PATIENT INSTRUCTIONS
Survey: You may be receiving a survey from [facility] regarding your visit today. You may get this in the mail, through your MyChart or in your email. Please complete the survey to enable us to provide the highest quality of care to you and your family. Please also, mention our names. If you cannot score us as very good (5 Stars) on any question, please feel free to call the office to discuss how we could have made your experience exceptional.      Thank You! MD Maurcie Gordon, Mukul Covington, ELIZAN RN    Christen Miller, [address] [place] [address]      1. Uncontrolled type 2 diabetes mellitus with hyperglycemia (HCC)  Add Humalog 5 units with each meal.  Watch a strict diabetic diet. Obtain labs approximately one week prior to the office appointment. Please fast for 12 hours prior to obtaining the labs. Water or black coffee (no cream or sugar) is allowed prior to the the labs. - insulin lispro, 1 Unit Dial, (HUMALOG KWIKPEN) 100 UNIT/ML SOPN; Inject 3 Units into the skin 3 times daily (before meals)  Dispense: 5 pen; Refill: 3  - Comprehensive Metabolic Panel; Future  - Hemoglobin A1C; Future  - Microalbumin / Creatinine Urine Ratio; Future    2. Benign essential HTN  Controlled on the current medication.  - Comprehensive Metabolic Panel; Future    3. Gastroesophageal reflux disease without esophagitis  No complaints of chest pain. 4. Mixed hyperlipidemia  Discussed watching a low fat / low carb diet. Obtain labs approximately one week prior to the office appointment. Please fast for 12 hours prior to obtaining the labs. Water or black coffee (no cream or sugar) is allowed prior to the the labs. - Comprehensive Metabolic Panel; Future  - Lipid Panel; Future    5. Cigarette smoker  Continue to cut back on smoking. 6. Microalbuminuria  On ARB. 7. Vitamin D deficiency  Controlled on Vitamin D replacement.     8. Chronic midline low back pain without sciatica  Continue on Flexeril as needed. Follow up with Dr. Gio Bird.  - cyclobenzaprine (FLEXERIL) 10 MG tablet; Take 1 tablet by mouth 3 times daily as needed for Muscle spasms  Dispense: 30 tablet; Refill: 0    Preston was instructed to follow up in the clinic in 3 months for check up or as needed with any medical issues.

## 2021-04-12 NOTE — PROGRESS NOTES
Dayan Murcia (:  1970) is a 53 y.o. male,Established patient, here for evaluation of the following chief complaint(s):  Discuss Labs, Back Pain (following with pain management, Dr Lynette Roland. Patient requests muscle relaxer until next appt), Diabetes, Hypertension, Gastroesophageal Reflux, Mental Health Problem, and Chronic Kidney Disease      ASSESSMENT/PLAN:  1. Uncontrolled type 2 diabetes mellitus with hyperglycemia (HCC)  -     insulin lispro, 1 Unit Dial, (HUMALOG KWIKPEN) 100 UNIT/ML SOPN; Inject 3 Units into the skin 3 times daily (before meals), Disp-5 pen, R-3Normal  -     Comprehensive Metabolic Panel; Future  -     Hemoglobin A1C; Future  -     Microalbumin / Creatinine Urine Ratio; Future  2. Benign essential HTN  -     Comprehensive Metabolic Panel; Future  3. Gastroesophageal reflux disease without esophagitis  4. Mixed hyperlipidemia  -     Comprehensive Metabolic Panel; Future  -     Lipid Panel; Future  5. Cigarette smoker  6. Microalbuminuria  7. Vitamin D deficiency  8. Chronic midline low back pain without sciatica  -     cyclobenzaprine (FLEXERIL) 10 MG tablet; Take 1 tablet by mouth 3 times daily as needed for Muscle spasms, Disp-30 tablet, R-0Normal      Plan:  1. Uncontrolled type 2 diabetes mellitus with hyperglycemia (HCC)  Add Humalog 5 units with each meal.  Watch a strict diabetic diet. Obtain labs approximately one week prior to the office appointment. Please fast for 12 hours prior to obtaining the labs. Water or black coffee (no cream or sugar) is allowed prior to the the labs. - insulin lispro, 1 Unit Dial, (HUMALOG KWIKPEN) 100 UNIT/ML SOPN; Inject 3 Units into the skin 3 times daily (before meals)  Dispense: 5 pen; Refill: 3  - Comprehensive Metabolic Panel; Future  - Hemoglobin A1C; Future  - Microalbumin / Creatinine Urine Ratio; Future    2. Benign essential HTN  Controlled on the current medication.  - Comprehensive Metabolic Panel; Future    3.  Gastroesophageal reflux disease without esophagitis  No complaints of chest pain. 4. Mixed hyperlipidemia  Discussed watching a low fat / low carb diet. Obtain labs approximately one week prior to the office appointment. Please fast for 12 hours prior to obtaining the labs. Water or black coffee (no cream or sugar) is allowed prior to the the labs. - Comprehensive Metabolic Panel; Future  - Lipid Panel; Future    5. Cigarette smoker  Continue to cut back on smoking. 6. Microalbuminuria  On ARB. 7. Vitamin D deficiency  Controlled on Vitamin D replacement. 8. Chronic midline low back pain without sciatica  Continue on Flexeril as needed. Follow up with Dr. Moon Washington.  - cyclobenzaprine (FLEXERIL) 10 MG tablet; Take 1 tablet by mouth 3 times daily as needed for Muscle spasms  Dispense: 30 tablet; Refill: 0    Preston was instructed to follow up in the clinic in 3 months for check up or as needed with any medical issues. SUBJECTIVE/OBJECTIVE:  Lucy Salvador presents for a check up on his medical conditions DM II, HTN, GERD, CKD. Lucy Salvador denies new problems. Medications were reviewed with Lucy Salvador, he is  tolerating the medication. Bowels are not regular (since his surgery). There has not been rectal bleeding. Lucy Salvador denies urinary complications, the urine stream is good. Preston denies chest pain and denies increasing shortness of breath. Labs from 4/21 reviewed. Preston follows with Dr. Moon Washington in pain mgt and will see him in the middle of May. Muscle relaxants continue to help and he hopes to continue on these until he can see him.       Past Medical History:  No date: GERD (gastroesophageal reflux disease)  No date: Hyperlipidemia  No date: Hypertension  No date: Microalbuminuria  No date: PAD (peripheral artery disease) ([facility] [address]    Past Surgical History:  No date: APPENDECTOMY  8/2012: ARTERIAL BYPASS SURGRY  2006: CARDIAC SURGERY  08/2019: COLECTOMY      Comment:  sigmoid resection ([facility])  06/16/2020: HERNIA REPAIR      Comment:  Dr John Bustos  2019: SMALL INTESTINE SURGERY      Comment:  small bowel resection ([facility])    Social History    Socioeconomic History      Marital status:       Spouse name: Not on file      Number of children: Not on file      Years of education: Not on file      Highest education level: Not on file    Occupational History      Occupation: disability    Social Needs      Financial resource strain: Not very hard      Food insecurity        Worry: Never true        Inability: Never true      Transportation needs        Medical: No        Non-medical: No    Tobacco Use      Smoking status: Current Some Day Smoker        Types: Cigarettes        Quit date: 10/27/2011        Years since quittin.4      Smokeless tobacco: Never Used      Tobacco comment: wife smokes, passive    Substance and Sexual Activity      Alcohol use: Not on file      Drug use: Not on file      Sexual activity: Not on file    Lifestyle      Physical activity        Days per week: Not on file        Minutes per session: Not on file      Stress: Not on file    Relationships      Social connections        Talks on phone: Not on file        Gets together: Not on file        Attends Hoahaoism service: Not on file        Active member of club or organization: Not on file        Attends meetings of clubs or organizations: Not on file        Relationship status: Not on file      Intimate partner violence        Fear of current or ex partner: Not on file        Emotionally abused: Not on file        Physically abused: Not on file        Forced sexual activity: Not on file    Other Topics      Concerns:        Not on file    Social History Narrative      Not on file      No family history on file.       Current Outpatient Medications on File Prior to Visit:  metoprolol tartrate (LOPRESSOR) 50 MG tablet, TAKE 1 TABLET BY MOUTH TWICE DAILY, Disp: 60 tablet, Rfl: 5  losartan (COZAAR) 100 MG tablet, TAKE 1 TABLET BY MOUTH EVERY DAY, Disp: 30 tablet, Rfl: 5  metFORMIN (GLUCOPHAGE) 1000 MG tablet, Take 1 tablet by mouth 2 times daily (with meals), Disp: 60 tablet, Rfl: 5  hydrOXYzine (ATARAX) 25 MG tablet, TAKE 1 TABLET BY MOUTH THREE TIMES DAILY AS NEEDED FOR ANXIETY, Disp: 90 tablet, Rfl: 1  cilostazol (PLETAL) 100 MG tablet, TAKE 1 TABLET BY MOUTH TWICE DAILY, Disp: 60 tablet, Rfl: 2  amLODIPine (NORVASC) 5 MG tablet, TAKE 1 TABLET BY MOUTH EVERY DAY, Disp: 30 tablet, Rfl: 2  insulin detemir (LEVEMIR FLEXTOUCH) 100 UNIT/ML injection pen, Inject 53 Units into the skin 2 times daily, Disp: 5 pen, Rfl: 3  pantoprazole (PROTONIX) 40 MG tablet, TAKE 1 TABLET BY MOUTH TWICE DAILY, Disp: 60 tablet, Rfl: 3  venlafaxine (EFFEXOR XR) 75 MG extended release capsule, TAKE 1 CAPSULE BY MOUTH EVERY DAY, Disp: 30 capsule, Rfl: 2  Cholecalciferol (VITAMIN D) 50 MCG (2000 UT) CAPS capsule, Take by mouth, Disp: , Rfl:   prazosin (MINIPRESS) 1 MG capsule, TAKE 1 CAPSULE BY MOUTH EVERY EVENING, Disp: 30 capsule, Rfl: 5  magnesium oxide (MAG-OX) 400 (241.3 Mg) MG TABS tablet, TAKE 1 TABLET BY MOUTH DAILY, Disp: , Rfl:   metoclopramide (REGLAN) 10 MG tablet, Take 10 mg by mouth, Disp: , Rfl:   hyoscyamine (ANASPAZ;LEVSIN) 125 MCG tablet, Take 125 mcg by mouth every 4 hours as needed for Cramping, Disp: , Rfl:   loperamide (IMODIUM) 2 MG capsule, Take 2 mg by mouth 4 times daily as needed for Diarrhea, Disp: , Rfl:   busPIRone (BUSPAR) 10 MG tablet, TAKE 1 TABLET BY MOUTH TWICE DAILY, Disp: 60 tablet, Rfl: 1  aspirin 81 MG EC tablet, Take 81 mg by mouth daily. , Disp: , Rfl:   acetaminophen-codeine (TYLENOL #3) 300-30 MG per tablet, TAKE 1 TABLET BY MOUTH EVERY EIGHT hours, Disp: , Rfl:   amoxicillin (AMOXIL) 500 MG capsule, TAKE 1 CAPSULE BY MOUTH EVERY EIGHT hours UNTIL GONE, Disp: , Rfl:   Drug [place] Unilet Lancets 28G MISC, Test TID and prn.  DX E11.9, Z79.4, Disp: 100 each, Rfl: 5  blood glucose monitor strips, 1 strip by Other route 3 times daily as needed for Other (as needed) Dx E11.9, Z79.4 [facility] Unilet Test strips, Disp: 100 strip, Rfl: 5  Insulin Syringe-Needle U-100 30G X 1/2\" 0.5 ML MISC, Dx E11.9, Z79.4 injecting insulin QD, Disp: 100 each, Rfl: 3  TRUE METRIX BLOOD GLUCOSE TEST strip, Test BS TID and prn., Disp: 50 each, Rfl: 11    No current facility-administered medications on file prior to visit.         -- Oxycodone-Acetaminophen     --  Other reaction(s): Mental Status Change      Lab Results       Component                Value               Date                       NA                       134 (L)             10/09/2020                 K                        4.1                 04/09/2021                 CL                       98                  10/09/2020                 CO2                      21                  10/09/2020                 BUN                      17                  10/09/2020                 CREATININE               1.08                04/09/2021                 GLUCOSE                  199 (H)             10/09/2020                 CALCIUM                  10.0                10/09/2020                 PROT                     7.0                 10/09/2020                 LABALBU                  4.8                 10/09/2020                 BILITOT                  0.45                10/09/2020                 ALKPHOS                  105                 10/09/2020                 AST                      47 (H)              10/09/2020                 ALT                      74 (H)              10/09/2020                 LABGLOM                  >60                 10/09/2020                 GFRAA                    >60                 10/09/2020              Lab Results       Component                Value               Date                       LABA1C                   9.7                 04/09/2021            Lab Results       Component                Value               Date                       EAG CHOLHDLRATIO             5.7 (H)             10/09/2020                 CHOLHDLRATIO             6.3 (H)             01/14/2019                              Diabetes  He presents for his follow-up diabetic visit. He has type 2 diabetes mellitus. His disease course has been worsening. There are no hypoglycemic associated symptoms. Pertinent negatives for diabetes include no chest pain. There are no hypoglycemic complications. Symptoms are worsening. Risk factors for coronary artery disease include dyslipidemia, diabetes mellitus, hypertension and male sex. Current diabetic treatment includes oral agent (monotherapy) and insulin injections. He is compliant with treatment all of the time. His weight is stable. He is following a diabetic diet. There is no change in his home blood glucose trend. An ACE inhibitor/angiotensin II receptor blocker is being taken. Hypertension  This is a chronic problem. The current episode started more than 1 year ago. The problem is unchanged. The problem is controlled. Pertinent negatives include no chest pain, neck pain, palpitations or shortness of breath. Past treatments include beta blockers, calcium channel blockers and alpha 1 blockers. The current treatment provides significant improvement. There are no compliance problems. There is no history of angina. Review of Systems   Constitutional: Negative. HENT: Negative for congestion, ear pain, postnasal drip, sneezing and sore throat. Eyes: Negative for visual disturbance. Respiratory: Negative. Negative for shortness of breath. Cardiovascular: Negative for chest pain, palpitations and leg swelling. Gastrointestinal: Negative for abdominal pain, blood in stool, constipation, diarrhea and nausea. Genitourinary: Negative for difficulty urinating, dysuria, frequency and urgency. Musculoskeletal: Positive for back pain. Negative for arthralgias, joint swelling, myalgias, neck pain and neck stiffness. Skin: Negative. Neurological: Negative for syncope. Psychiatric/Behavioral: Negative. Physical Exam  Vitals signs and nursing note reviewed. Constitutional:       Appearance: He is well-developed. HENT:      Head: Atraumatic. Eyes:      Conjunctiva/sclera: Conjunctivae normal.   Neck:      Musculoskeletal: Normal range of motion and neck supple. Cardiovascular:      Rate and Rhythm: Normal rate and regular rhythm. Heart sounds: Normal heart sounds. Pulmonary:      Effort: Pulmonary effort is normal.      Breath sounds: Normal breath sounds. Abdominal:      Palpations: Abdomen is soft. Tenderness: There is no abdominal tenderness. Lymphadenopathy:      Cervical: No cervical adenopathy. Skin:     Findings: No rash. Neurological:      Mental Status: He is alert. Psychiatric:         Behavior: Behavior normal.         Thought Content: Thought content normal.                 An electronic signature was used to authenticate this note.     --Jeni Abraham MD

## 2021-04-26 DIAGNOSIS — K21.9 GASTROESOPHAGEAL REFLUX DISEASE WITHOUT ESOPHAGITIS: ICD-10-CM

## 2021-04-26 DIAGNOSIS — F41.1 GENERALIZED ANXIETY DISORDER: ICD-10-CM

## 2021-04-26 RX ORDER — PANTOPRAZOLE SODIUM 40 MG/1
TABLET, DELAYED RELEASE ORAL
Qty: 60 TABLET | Refills: 3 | Status: SHIPPED | OUTPATIENT
Start: 2021-04-26 | End: 2021-09-27 | Stop reason: SDUPTHER

## 2021-04-26 RX ORDER — HYDROXYZINE HYDROCHLORIDE 25 MG/1
TABLET, FILM COATED ORAL
Qty: 90 TABLET | Refills: 1 | Status: SHIPPED | OUTPATIENT
Start: 2021-04-26 | End: 2021-05-20

## 2021-04-26 NOTE — TELEPHONE ENCOUNTER
2:45 PM Ananya Vazquez MD [facility] [address]            Patient Active Problem List:     GERD (gastroesophageal reflux disease)     Carpal tunnel syndrome of right wrist     Benign essential HTN     Diabetic peripheral neuropathy (HCC)     Mixed hyperlipidemia     Vitamin D deficiency     Leg pain, left     Microalbuminuria     PVD (peripheral vascular disease) (HCC)     Status post partial resection of colon     Cigarette smoker     Left carotid artery stenosis     Pain in upper limb     Situational mixed anxiety and depressive disorder     History of coronary artery bypass graft     H/O heart artery stent     History of DVT (deep vein thrombosis)

## 2021-05-17 ENCOUNTER — TELEPHONE (OUTPATIENT)
Dept: FAMILY MEDICINE CLINIC | Age: 51
End: 2021-05-17

## 2021-05-17 DIAGNOSIS — G89.29 CHRONIC MIDLINE LOW BACK PAIN WITHOUT SCIATICA: ICD-10-CM

## 2021-05-17 DIAGNOSIS — E78.2 MIXED HYPERLIPIDEMIA: ICD-10-CM

## 2021-05-17 DIAGNOSIS — M54.50 CHRONIC MIDLINE LOW BACK PAIN WITHOUT SCIATICA: ICD-10-CM

## 2021-05-17 DIAGNOSIS — G89.29 CHRONIC MIDLINE LOW BACK PAIN WITHOUT SCIATICA: Primary | ICD-10-CM

## 2021-05-17 DIAGNOSIS — M54.50 CHRONIC MIDLINE LOW BACK PAIN WITHOUT SCIATICA: Primary | ICD-10-CM

## 2021-05-17 RX ORDER — ROSUVASTATIN CALCIUM 10 MG/1
10 TABLET, COATED ORAL NIGHTLY
Qty: 30 TABLET | Refills: 5 | Status: SHIPPED | OUTPATIENT
Start: 2021-05-17 | End: 2022-01-17

## 2021-05-18 RX ORDER — CYCLOBENZAPRINE HCL 10 MG
TABLET ORAL
Qty: 30 TABLET | Refills: 0 | Status: SHIPPED | OUTPATIENT
Start: 2021-05-18 | End: 2021-09-21

## 2021-05-18 NOTE — TELEPHONE ENCOUNTER
Health Maintenance   Topic Date Due    Hepatitis C screen  Never done    Hepatitis B vaccine (1 of 3 - Risk 3-dose series) Never done    Shingles Vaccine (1 of 2) Never done    Colon cancer screen colonoscopy  Never done    A1C test (Diabetic or Prediabetic)  07/09/2021    Diabetic foot exam  10/09/2021    Diabetic microalbuminuria test  10/09/2021    Annual Wellness Visit (AWV)  10/28/2021    Lipid screen  04/09/2022    Potassium monitoring  04/09/2022    Creatinine monitoring  04/09/2022    Diabetic retinal exam  04/27/2022    DTaP/Tdap/Td vaccine (2 - Td) 11/19/2022    Pneumococcal 0-64 years Vaccine (2 of 2) 04/15/2036    Flu vaccine  Completed    COVID-19 Vaccine  Completed    HIV screen  Completed    Hepatitis A vaccine  Aged Out    Hib vaccine  Aged Out    Meningococcal (ACWY) vaccine  Aged Out             (applicable per patient's age: Cancer Screenings, Depression Screening, Fall Risk Screening, Immunizations)    Hemoglobin A1C (%)   Date Value   04/09/2021 9.7   12/22/2020 10.1   10/09/2020 10.6     Microalb/Crt.  Ratio (mcg/mg creat)   Date Value   10/09/2020 24 (H)     LDL Cholesterol (mg/dL)   Date Value   10/09/2020 85     LDL Calculated (mg/dL)   Date Value   04/09/2021 91     AST (U/L)   Date Value   10/09/2020 47 (H)     ALT (U/L)   Date Value   10/09/2020 74 (H)     BUN (mg/dL)   Date Value   10/09/2020 17      (goal A1C is < 7)   (goal LDL is <100) need 30-50% reduction from baseline     BP Readings from Last 3 Encounters:   04/12/21 118/76   01/07/21 124/86   11/09/20 106/62    (goal /80)      All Future Testing planned in [facility]:  Lab Frequency Next Occurrence   Comprehensive Metabolic Panel Once 14/88/0761   Hemoglobin A1C Once 07/12/2021   Lipid Panel Once 07/12/2021   Microalbumin / Creatinine Urine Ratio Once 07/12/2021       Next Visit Date:  Future Appointments   Date Time Provider Dwaine Pal   7/20/2021  1:30 PM MD Blaine Maddox [facility] 10/28/2021  2:45 PM MD Mitchell [place] [facility]            Patient Active Problem List:     GERD (gastroesophageal reflux disease)     Carpal tunnel syndrome of right wrist     Benign essential HTN     Diabetic peripheral neuropathy (HCC)     Mixed hyperlipidemia     Vitamin D deficiency     Leg pain, left     Microalbuminuria     PVD (peripheral vascular disease) ([facility] [address]     Status post partial resection of colon     Cigarette smoker     Left carotid artery stenosis     Pain in upper limb     Situational mixed anxiety and depressive disorder     History of coronary artery bypass graft     H/O heart artery stent     History of DVT (deep vein thrombosis)

## 2021-05-19 DIAGNOSIS — F41.1 GENERALIZED ANXIETY DISORDER: ICD-10-CM

## 2021-05-19 DIAGNOSIS — I10 BENIGN ESSENTIAL HTN: ICD-10-CM

## 2021-05-20 RX ORDER — AMLODIPINE BESYLATE 5 MG/1
TABLET ORAL
Qty: 30 TABLET | Refills: 5 | Status: SHIPPED | OUTPATIENT
Start: 2021-05-20 | End: 2022-01-03

## 2021-05-20 RX ORDER — CILOSTAZOL 100 MG/1
TABLET ORAL
Qty: 60 TABLET | Refills: 5 | Status: SHIPPED | OUTPATIENT
Start: 2021-05-20 | End: 2022-01-26 | Stop reason: SDUPTHER

## 2021-05-20 RX ORDER — HYDROXYZINE HYDROCHLORIDE 25 MG/1
TABLET, FILM COATED ORAL
Qty: 90 TABLET | Refills: 1 | Status: SHIPPED | OUTPATIENT
Start: 2021-05-20 | End: 2021-09-29

## 2021-05-20 NOTE — TELEPHONE ENCOUNTER
10/28/2021  2:45 PM MD Joanna Cote [facility]            Patient Active Problem List:     GERD (gastroesophageal reflux disease)     Carpal tunnel syndrome of right wrist     Benign essential HTN     Diabetic peripheral neuropathy (HCC)     Mixed hyperlipidemia     Vitamin D deficiency     Leg pain, left     Microalbuminuria     PVD (peripheral vascular disease) ([facility] [address]     Status post partial resection of colon     Cigarette smoker     Left carotid artery stenosis     Pain in upper limb     Situational mixed anxiety and depressive disorder     History of coronary artery bypass graft     H/O heart artery stent     History of DVT (deep vein thrombosis)

## 2021-06-25 ENCOUNTER — OFFICE VISIT (OUTPATIENT)
Dept: PAIN MANAGEMENT | Age: 51
End: 2021-06-25
Payer: MEDICARE

## 2021-06-25 VITALS
TEMPERATURE: 99 F | WEIGHT: 195 LBS | SYSTOLIC BLOOD PRESSURE: 136 MMHG | DIASTOLIC BLOOD PRESSURE: 84 MMHG | BODY MASS INDEX: 28.8 KG/M2 | OXYGEN SATURATION: 96 %

## 2021-06-25 DIAGNOSIS — M48.062 LUMBAR STENOSIS WITH NEUROGENIC CLAUDICATION: Primary | ICD-10-CM

## 2021-06-25 PROCEDURE — 99204 OFFICE O/P NEW MOD 45 MIN: CPT | Performed by: PHYSICAL MEDICINE & REHABILITATION

## 2021-06-25 RX ORDER — ACETAMINOPHEN AND CODEINE PHOSPHATE 300; 30 MG/1; MG/1
1 TABLET ORAL EVERY 4 HOURS PRN
Qty: 28 TABLET | Refills: 0 | Status: SHIPPED | OUTPATIENT
Start: 2021-06-25 | End: 2021-07-02

## 2021-06-25 NOTE — PROGRESS NOTES
Pain Management Consultation    [id number] [place] [address] Dr Dwaine June  [id number]  [facility] [address]  Dept: 100 Leeanna Taylor MD  [address] [place] [address]       Dayan Murcia is a 49 y.o. male, who came today due to  back pain, hip pain and knee pain    Cause of the symptom(s): Stroke and degenerative (arthritis)    Onset: 10years    Quality of Symptoms: numbness and tingling    Aggravating factors: Walking, lifting, twisting, bending forward and bending backward    Relieving factors: Warm bath, lying down, use of medication and rest    Red Flags: pain at night, pain with lying down and osteoporosis    Treatment done: physical therapy, injection, anti-inflammatory medication and muscle relaxant      Current pain level: 4 on the scale of 0-10 ( 10 being worst)       Allergies   Allergen Reactions    Oxycodone-Acetaminophen      Other reaction(s): Mental Status Change       Social History     Socioeconomic History    Marital status:      Spouse name: Not on file    Number of children: Not on file    Years of education: Not on file    Highest education level: Not on file   Occupational History    Occupation: disability   Tobacco Use    Smoking status: Current Some Day Smoker     Types: Cigarettes     Last attempt to quit: 10/27/2011     Years since quittin.6    Smokeless tobacco: Never Used    Tobacco comment: wife smokes, passive   Substance and Sexual Activity    Alcohol use: Not on file    Drug use: Not on file    Sexual activity: Not on file   Other Topics Concern    Not on file   Social History Narrative    Not on file     Social Determinants of Health     Financial Resource Strain:     Difficulty of Paying Living Expenses:    Food Insecurity:     Worried About Running Out of Food in the Last Year:     Khadijah of Food in the Last Year:    Transportation Needs:     Lack of Transportation (Medical):      Lack of Transportation (Non-Medical):    Physical Activity:     Days of Exercise per Week:     Minutes of Exercise per Session:    Stress:     Feeling of Stress :    Social Connections:     Frequency of Communication with Friends and Family:     Frequency of Social Gatherings with Friends and Family:     Attends Protestant Services:     Active Member of Clubs or Organizations:     Attends Club or Organization Meetings:     Marital Status:    Intimate Partner Violence:     Fear of Current or Ex-Partner:     Emotionally Abused:     Physically Abused:     Sexually Abused:        Past Medical History:   Diagnosis Date    GERD (gastroesophageal reflux disease)     Hyperlipidemia     Hypertension     Microalbuminuria     PAD (peripheral artery disease) ([facility] [address]        Past Surgical History:   Procedure Laterality Date    APPENDECTOMY      ARTERIAL BYPASS SURGRY  8/2012    CARDIAC SURGERY  2006    COLECTOMY  08/2019    sigmoid resection ([facility])    HERNIA REPAIR  06/16/2020    Dr Shree Alberts  08/2019    small bowel resection ([facility])       Prior to Visit Medications    Medication Sig Taking?  Authorizing Provider   hydrOXYzine (ATARAX) 25 MG tablet TAKE 1 TABLET BY MOUTH THREE TIMES DAILY AS NEEDED FOR ANXIETY  John Mathias MD   cilostazol (PLETAL) 100 MG tablet TAKE 1 TABLET BY MOUTH TWICE DAILY  John Mathias MD   amLODIPine (NORVASC) 5 MG tablet TAKE 1 TABLET BY MOUTH EVERY DAY  John aMthias MD   cyclobenzaprine (FLEXERIL) 10 MG tablet TAKE 1 TABLET BY MOUTH THREE TIMES DAILY AS NEEDED FOR MUSCLE SPASMS  John Mathias MD   rosuvastatin (CRESTOR) 10 MG tablet Take 1 tablet by mouth nightly  John Mathias MD   pantoprazole (PROTONIX) 40 MG tablet TAKE 1 TABLET BY MOUTH TWICE DAILY  John Mathias MD   acetaminophen-codeine (TYLENOL #3) 300-30 MG per tablet TAKE 1 TABLET BY MOUTH EVERY EIGHT hours  Historical Provider, MD   amoxicillin (AMOXIL) 500 MG capsule TAKE 1 CAPSULE BY MOUTH EVERY EIGHT hours 9 [facility]  Historical Provider, MD   insulin lispro, 1 Unit Dial, (HUMALOG KWIKPEN) 100 UNIT/ML SOPN Inject 3 Units into the skin 3 times daily (before meals)  John Mathias MD   metoprolol tartrate (LOPRESSOR) 50 MG tablet TAKE 1 TABLET BY MOUTH TWICE DAILY  John Mathias MD   losartan (COZAAR) 100 MG tablet TAKE 1 TABLET BY MOUTH EVERY DAY  John Mathias MD   metFORMIN (GLUCOPHAGE) 1000 MG tablet Take 1 tablet by mouth 2 times daily (with meals)  John Mathias MD   insulin detemir (LEVEMIR FLEXTOUCH) 100 UNIT/ML injection pen Inject 53 Units into the skin 2 times daily  John Mathias MD   venlafaxine (EFFEXOR XR) 75 MG extended release capsule TAKE 1 CAPSULE BY MOUTH EVERY DAY  John Mathias MD   Cholecalciferol (VITAMIN D) 50 MCG (2000 UT) CAPS capsule Take by mouth  Historical Provider, MD   prazosin (MINIPRESS) 1 MG capsule TAKE 1 CAPSULE BY MOUTH EVERY EVENING  John Mathias MD   Drug [place] Unilet Lancets 28G MISC Test TID and prn. DX E11.9, Z79.4  John Mathias MD   blood glucose monitor strips 1 strip by Other route 3 times daily as needed for Other (as needed) Dx E11.9, Z79.4 Drug [place] Unilet Test strips  John Mathias MD   Insulin Syringe-Needle U-100 30G X 1/2\" 0.5 ML MISC Dx E11.9, Z79.4 injecting insulin QD  John Mathias MD   magnesium oxide (MAG-OX) 400 (241.3 Mg) MG TABS tablet TAKE 1 TABLET BY MOUTH DAILY  Historical Provider, MD   metoclopramide (REGLAN) 10 MG tablet Take 10 mg by mouth  Historical Provider, MD   hyoscyamine (ANASPAZ;LEVSIN) 125 MCG tablet Take 125 mcg by mouth every 4 hours as needed for Cramping  Historical Provider, MD   loperamide (IMODIUM) 2 MG capsule Take 2 mg by mouth 4 times daily as needed for Diarrhea  Historical Provider, MD   busPIRone (BUSPAR) 10 MG tablet TAKE 1 TABLET BY MOUTH TWICE DAILY  Sahra Mckeon, APRN - NP   TRUE METRIX BLOOD GLUCOSE TEST strip Test BS TID and prn. John Mathias MD   aspirin 81 MG EC tablet Take 81 mg by mouth daily.     Historical Provider, MD Ceja family history on file. Review of Systems : All systems reviewed, all unremarkable other than HPI/subjective. No change since last visit. Denies  fever, chills, infection or non healing wound. /84 (Site: Right Upper Arm, Position: Sitting, Cuff Size: Medium Adult)   Temp 99 °F (37.2 °C)   Wt 195 lb (88.5 kg)   SpO2 96%   BMI 28.80 kg/m²       Physical Exam  Constitutional:       General: He is not in acute distress. HENT:      Head: Atraumatic. Cardiovascular:      Rate and Rhythm: Normal rate. Pulmonary:      Effort: Pulmonary effort is normal. No respiratory distress. Musculoskeletal:         General: Tenderness present. Cervical back: Neck supple. Comments: Lumbar spine    Skin:     General: Skin is dry. Neurological:      Mental Status: He is alert and oriented to person, place, and time. Motor: Weakness present. Gait: Gait normal.      Comments: Giveaway weakness, left dorsiflexor   Psychiatric:         Mood and Affect: Mood normal.         Behavior: Behavior normal.         Assessment and Plan:      Diagnosis Orders   1. Lumbar stenosis with neurogenic claudication  MRI LUMBAR SPINE WO CONTRAST       History of stroke, right  MCA, with paresthesia, left hemibody. MRI of the LS spine to check for radicular pathology, left side. In the interim - continue flexeril and tylenol ## 3. FF up 1 week after MRI. Add the lyrica potentially as an adjunct. I have reviewed the chief complaint and HPI including the [facility] and Vital documentation by my staff and I agree with their documentation and have added where applicable. Time spent with patient was 45  minutes. More than 50% was spent counseling/coordinating the patient's care.          Evon Jones MD   Spine Medicine/PM&R

## 2021-07-01 DIAGNOSIS — E11.65 UNCONTROLLED TYPE 2 DIABETES MELLITUS WITH HYPERGLYCEMIA (HCC): ICD-10-CM

## 2021-07-01 RX ORDER — INSULIN DETEMIR 100 [IU]/ML
INJECTION, SOLUTION SUBCUTANEOUS
Qty: 15 ML | Refills: 3 | Status: SHIPPED | OUTPATIENT
Start: 2021-07-01 | End: 2021-11-03

## 2021-07-01 NOTE — TELEPHONE ENCOUNTER
Health Maintenance   Topic Date Due    Hepatitis C screen  Never done    Hepatitis B vaccine (1 of 3 - Risk 3-dose series) Never done    Shingles Vaccine (1 of 2) Never done    Colon cancer screen colonoscopy  Never done    A1C test (Diabetic or Prediabetic)  07/09/2021    Flu vaccine (1) 09/01/2021    Diabetic foot exam  10/09/2021    Diabetic microalbuminuria test  10/09/2021    Annual Wellness Visit (AWV)  10/28/2021    Lipid screen  04/09/2022    Potassium monitoring  04/09/2022    Creatinine monitoring  04/09/2022    Diabetic retinal exam  04/27/2022    DTaP/Tdap/Td vaccine (2 - Td or Tdap) 11/19/2022    Pneumococcal 0-64 years Vaccine (2 of 2 - PPSV23) 04/15/2036    COVID-19 Vaccine  Completed    HIV screen  Completed    Hepatitis A vaccine  Aged Out    Hib vaccine  Aged Out    Meningococcal (ACWY) vaccine  Aged Out             (applicable per patient's age: Cancer Screenings, Depression Screening, Fall Risk Screening, Immunizations)    Hemoglobin A1C (%)   Date Value   04/09/2021 9.7   12/22/2020 10.1   10/09/2020 10.6     Microalb/Crt.  Ratio (mcg/mg creat)   Date Value   10/09/2020 24 (H)     LDL Cholesterol (mg/dL)   Date Value   10/09/2020 85     LDL Calculated (mg/dL)   Date Value   04/09/2021 91     AST (U/L)   Date Value   10/09/2020 47 (H)     ALT (U/L)   Date Value   10/09/2020 74 (H)     BUN (mg/dL)   Date Value   10/09/2020 17      (goal A1C is < 7)   (goal LDL is <100) need 30-50% reduction from baseline     BP Readings from Last 3 Encounters:   06/25/21 136/84   04/12/21 118/76   01/07/21 124/86    (goal /80)      All Future Testing planned in [facility]:  Lab Frequency Next Occurrence   Comprehensive Metabolic Panel Once 39/85/2934   Hemoglobin A1C Once 07/12/2021   Lipid Panel Once 07/12/2021   Microalbumin / Creatinine Urine Ratio Once 07/12/2021   MRI LUMBAR SPINE WO CONTRAST Once 06/25/2021   Urine Drug Screen Once 06/25/2021       Next Visit Date:  Future Appointments Date Time Provider wDaine Pal   8/16/2021  9:15 AM MD Blaine Gunderson   10/28/2021  2:45 PM [facility]            Patient Active Problem List:     GERD (gastroesophageal reflux disease)     Carpal tunnel syndrome of right wrist     Benign essential HTN     Diabetic peripheral neuropathy (HCC)     Mixed hyperlipidemia     Vitamin D deficiency     Leg pain, left     Microalbuminuria     PVD (peripheral vascular disease) ([address]     Status post partial resection of colon     Cigarette smoker     Left carotid artery stenosis     Pain in upper limb     Situational mixed anxiety and depressive disorder     History of coronary artery bypass graft     H/O heart artery stent     History of DVT (deep vein thrombosis)

## 2021-07-14 NOTE — TELEPHONE ENCOUNTER
Patient called requesting a refill of Tylenol #3. Patient was last seen in office on 06/25/2021.  Thanks

## 2021-07-22 DIAGNOSIS — G89.29 CHRONIC MIDLINE LOW BACK PAIN WITHOUT SCIATICA: ICD-10-CM

## 2021-07-22 DIAGNOSIS — M54.50 CHRONIC MIDLINE LOW BACK PAIN WITHOUT SCIATICA: ICD-10-CM

## 2021-07-22 NOTE — TELEPHONE ENCOUNTER
Refill request  Last OV 6/25/2021  Last date RX written 4/8/21  Next scheduled appt 7/30/2021  Medication pended    Health Maintenance   Topic Date Due    Hepatitis C screen  Never done    Hepatitis B vaccine (1 of 3 - Risk 3-dose series) Never done    Colon cancer screen colonoscopy  Never done    Shingles Vaccine (1 of 2) Never done    A1C test (Diabetic or Prediabetic)  07/09/2021    Flu vaccine (1) 09/01/2021    Diabetic foot exam  10/09/2021    Diabetic microalbuminuria test  10/09/2021    Annual Wellness Visit (AWV)  10/28/2021    Lipid screen  04/09/2022    Potassium monitoring  04/09/2022    Creatinine monitoring  04/09/2022    Diabetic retinal exam  04/27/2022    DTaP/Tdap/Td vaccine (2 - Td or Tdap) 11/19/2022    Pneumococcal 0-64 years Vaccine (2 of 2 - PPSV23) 04/15/2036    COVID-19 Vaccine  Completed    HIV screen  Completed    Hepatitis A vaccine  Aged Out    Hib vaccine  Aged Out    Meningococcal (ACWY) vaccine  Aged Out             (applicable per patient's age: Cancer Screenings, Depression Screening, Fall Risk Screening, Immunizations)    Hemoglobin A1C (%)   Date Value   04/09/2021 9.7   12/22/2020 10.1   10/09/2020 10.6     Microalb/Crt.  Ratio (mcg/mg creat)   Date Value   10/09/2020 24 (H)     LDL Cholesterol (mg/dL)   Date Value   10/09/2020 85     LDL Calculated (mg/dL)   Date Value   04/09/2021 91     AST (U/L)   Date Value   10/09/2020 47 (H)     ALT (U/L)   Date Value   10/09/2020 74 (H)     BUN (mg/dL)   Date Value   10/09/2020 17      (goal A1C is < 7)   (goal LDL is <100) need 30-50% reduction from baseline     BP Readings from Last 3 Encounters:   06/25/21 136/84   04/12/21 118/76   01/07/21 124/86    (goal /80)      All Future Testing planned in [facility]:  Lab Frequency Next Occurrence   Comprehensive Metabolic Panel Once 69/58/0645   Hemoglobin A1C Once 08/16/2021   Lipid Panel Once 08/16/2021   Microalbumin / Creatinine Urine Ratio Once 08/16/2021   MRI LUMBAR SPINE WO CONTRAST Once 06/25/2021   Urine Drug Screen Once 06/25/2021       Next Visit Date:  Future Appointments   Date Time Provider Dwaine Kae   7/30/2021  1:00 PM Shandra Limon MD [facility] Kevin Pae   8/16/2021  9:15 AM MD Blaine West  Kevin Pae   10/28/2021  2:45 PM Yonatan Rodríguez MD [facility] Carol Pae            Patient Active Problem List:     GERD (gastroesophageal reflux disease)     Carpal tunnel syndrome of right wrist     Benign essential HTN     Diabetic peripheral neuropathy (HCC)     Mixed hyperlipidemia     Vitamin D deficiency     Leg pain, left     Microalbuminuria     PVD (peripheral vascular disease) ([facility] [address]     Status post partial resection of colon     Cigarette smoker     Left carotid artery stenosis     Pain in upper limb     Situational mixed anxiety and depressive disorder     History of coronary artery bypass graft     H/O heart artery stent     History of DVT (deep vein thrombosis)

## 2021-07-30 ENCOUNTER — HOSPITAL ENCOUNTER (OUTPATIENT)
Age: 51
Setting detail: SPECIMEN
Discharge: HOME | End: 2021-07-30
Payer: MEDICARE

## 2021-07-30 ENCOUNTER — OFFICE VISIT (OUTPATIENT)
Dept: PAIN MANAGEMENT | Age: 51
End: 2021-07-30
Payer: MEDICARE

## 2021-07-30 VITALS
DIASTOLIC BLOOD PRESSURE: 88 MMHG | OXYGEN SATURATION: 94 % | BODY MASS INDEX: 27.25 KG/M2 | WEIGHT: 184.5 LBS | SYSTOLIC BLOOD PRESSURE: 124 MMHG

## 2021-07-30 DIAGNOSIS — I69.30 SEQUELAE, POST-STROKE: Primary | ICD-10-CM

## 2021-07-30 DIAGNOSIS — M48.062 LUMBAR STENOSIS WITH NEUROGENIC CLAUDICATION: ICD-10-CM

## 2021-07-30 DIAGNOSIS — G89.4 CHRONIC PAIN SYNDROME: ICD-10-CM

## 2021-07-30 LAB
AMPHETAMINE SCREEN URINE: NEGATIVE
BARBITURATE SCREEN URINE: NEGATIVE
BENZODIAZEPINE SCREEN, URINE: NEGATIVE
BUPRENORPHINE URINE: NORMAL
CANNABINOID SCREEN URINE: NEGATIVE
COCAINE METABOLITE, URINE: NEGATIVE
MDMA URINE: NORMAL
METHADONE SCREEN, URINE: NEGATIVE
METHAMPHETAMINE, URINE: NEGATIVE
OPIATES, URINE: NEGATIVE
OXYCODONE SCREEN URINE: NEGATIVE
PHENCYCLIDINE, URINE: NEGATIVE
PROPOXYPHENE, URINE: NEGATIVE
TEST INFORMATION: NORMAL
TRICYCLIC ANTIDEPRESSANTS, UR: NEGATIVE

## 2021-07-30 PROCEDURE — 99213 OFFICE O/P EST LOW 20 MIN: CPT | Performed by: PHYSICAL MEDICINE & REHABILITATION

## 2021-07-30 PROCEDURE — 80306 DRUG TEST PRSMV INSTRMNT: CPT

## 2021-07-30 RX ORDER — CYCLOBENZAPRINE HCL 10 MG
10 TABLET ORAL 3 TIMES DAILY PRN
Qty: 90 TABLET | Refills: 3 | Status: SHIPPED | OUTPATIENT
Start: 2021-07-30 | End: 2021-08-09

## 2021-07-30 NOTE — PROGRESS NOTES
FOLLOW UP APPOINTMENT:             2021       Amena Hicks is a 49 y.o. male, who came for a  follow up to discuss treatment options    Cause of the symptom(s): Stroke and degenerative (arthritis)    Onset: 10years      Prior reatment done: muscle relaxant, steroid and opioid    Current pain level: 6 on the scale of 0-10 ( 10 being worst)     Quality of Symptoms: numbness and tingling    Aggravating factors: activity, lifting, twisting, bending forward and bending backward    Relieving factors: rest, lying down and use of medication        Allergies   Allergen Reactions    Oxycodone-Acetaminophen      Other reaction(s): Mental Status Change       Social History     Socioeconomic History    Marital status:      Spouse name: Not on file    Number of children: Not on file    Years of education: Not on file    Highest education level: Not on file   Occupational History    Occupation: disability   Tobacco Use    Smoking status: Current Some Day Smoker     Types: Cigarettes     Last attempt to quit: 10/27/2011     Years since quittin.7    Smokeless tobacco: Never Used    Tobacco comment: wife smokes, passive   Substance and Sexual Activity    Alcohol use: Not on file    Drug use: Not on file    Sexual activity: Not on file   Other Topics Concern    Not on file   Social History Narrative    Not on file     Social Determinants of Health     Financial Resource Strain:     Difficulty of Paying Living Expenses:    Food Insecurity:     Worried About Running Out of Food in the Last Year:     [address] in the Last Year:    Transportation Needs:     Lack of Transportation (Medical):      Lack of Transportation (Non-Medical):    Physical Activity:     Days of Exercise per Week:     Minutes of Exercise per Session:    Stress:     Feeling of Stress :    Social Connections:     Frequency of Communication with Friends and Family:     Frequency of Social Gatherings with Friends and Family:     Attends Mormonism Services:     Active Member of Clubs or Organizations:     Attends Club or Organization Meetings:     Marital Status:    Intimate Partner Violence:     Fear of Current or Ex-Partner:     Emotionally Abused:     Physically Abused:     Sexually Abused:        Past Medical History:   Diagnosis Date    GERD (gastroesophageal reflux disease)     Hyperlipidemia     Hypertension     Microalbuminuria     PAD (peripheral artery disease) ([facility] [address]        Past Surgical History:   Procedure Laterality Date    APPENDECTOMY      ARTERIAL BYPASS SURGRY  8/2012    CARDIAC SURGERY  2006    COLECTOMY  08/2019    sigmoid resection ([facility])    HERNIA REPAIR  06/16/2020    Dr Erin Hyatt  08/2019    small bowel resection ([facility])       History reviewed. No pertinent family history. Prior to Visit Medications    Medication Sig Taking?  Authorizing Provider   cyclobenzaprine (FLEXERIL) 10 MG tablet Take 1 tablet by mouth 3 times daily as needed for Muscle spasms Yes Conrad Green MD   LEVEMIR FLEXTOUCH 100 UNIT/ML injection pen Inject 75 Units into the skin nightly Yes John Mathias MD   hydrOXYzine (ATARAX) 25 MG tablet TAKE 1 TABLET BY MOUTH THREE TIMES DAILY AS NEEDED FOR ANXIETY Yes John Mathias MD   cilostazol (PLETAL) 100 MG tablet TAKE 1 TABLET BY MOUTH TWICE DAILY Yes John Mathias MD   amLODIPine (NORVASC) 5 MG tablet TAKE 1 TABLET BY MOUTH EVERY DAY Yes John Mathias MD   cyclobenzaprine (FLEXERIL) 10 MG tablet TAKE 1 TABLET BY MOUTH THREE TIMES DAILY AS NEEDED FOR MUSCLE SPASMS Yes oJhn Mathias MD   rosuvastatin (CRESTOR) 10 MG tablet Take 1 tablet by mouth nightly Yes John Mathias MD   pantoprazole (PROTONIX) 40 MG tablet TAKE 1 TABLET BY MOUTH TWICE DAILY Yes John Mathias MD   acetaminophen-codeine (TYLENOL #3) 300-30 MG per tablet TAKE 1 TABLET BY MOUTH EVERY EIGHT hours Yes Historical Provider, MD   amoxicillin (AMOXIL) 500 MG capsule TAKE 1 CAPSULE BY MOUTH EVERY EIGHT hours UNTIL GONE Yes Historical Provider, MD   insulin lispro, 1 Unit Dial, (HUMALOG KWIKPEN) 100 UNIT/ML SOPN Inject 3 Units into the skin 3 times daily (before meals) Yes John Mathias MD   metoprolol tartrate (LOPRESSOR) 50 MG tablet TAKE 1 TABLET BY MOUTH TWICE DAILY Yes John Mathias MD   losartan (COZAAR) 100 MG tablet TAKE 1 TABLET BY MOUTH EVERY DAY Yes John Mathias MD   metFORMIN (GLUCOPHAGE) 1000 MG tablet Take 1 tablet by mouth 2 times daily (with meals) Yes John Mathias MD   venlafaxine (EFFEXOR XR) 75 MG extended release capsule TAKE 1 CAPSULE BY MOUTH EVERY DAY Yes John Mathias MD   Cholecalciferol (VITAMIN D) 50 MCG (2000 UT) CAPS capsule Take by mouth Yes Historical Provider, MD   prazosin (MINIPRESS) 1 MG capsule TAKE 1 CAPSULE BY MOUTH EVERY EVENING Yes John Mathias MD   Drug [place] Unilet Lancets 28G MISC Test TID and prn. DX E11.9, Z79.4 Yes John Mathias MD   blood glucose monitor strips 1 strip by Other route 3 times daily as needed for Other (as needed) Dx E11.9, Z79.4 Drug [place] Unilet Test strips Yes John Mathias MD   Insulin Syringe-Needle U-100 30G X 1/2\" 0.5 ML MISC Dx E11.9, Z79.4 injecting insulin QD Yes John Mathias MD   magnesium oxide (MAG-OX) 400 (241.3 Mg) MG TABS tablet TAKE 1 TABLET BY MOUTH DAILY Yes Historical Provider, MD   metoclopramide (REGLAN) 10 MG tablet Take 10 mg by mouth Yes Historical Provider, MD   hyoscyamine (ANASPAZ;LEVSIN) 125 MCG tablet Take 125 mcg by mouth every 4 hours as needed for Cramping Yes Historical Provider, MD   loperamide (IMODIUM) 2 MG capsule Take 2 mg by mouth 4 times daily as needed for Diarrhea Yes Historical Provider, MD   busPIRone (BUSPAR) 10 MG tablet TAKE 1 TABLET BY MOUTH TWICE DAILY Yes Holly Lowery APRN - NP   TRUE METRIX BLOOD GLUCOSE TEST strip Test BS TID and prn. Yes John Mathias MD   aspirin 81 MG EC tablet Take 81 mg by mouth daily.    Yes Historical Provider, MD       Review of Systems : All systems reviewed, all unremarkable other than HPI/subjective. No change since last visit. Denies  fever, chills, infection or non healing wound. /88 (Site: Right Upper Arm, Position: Sitting, Cuff Size: Medium Adult)   Wt 184 lb 8 oz (83.7 kg)   SpO2 94%   BMI 27.25 kg/m²       Physical Exam  Constitutional:       General: He is not in acute distress. HENT:      Head: Atraumatic. Cardiovascular:      Rate and Rhythm: Normal rate. Heart sounds: Normal heart sounds. Neurological:      Mental Status: He is alert and oriented to person, place, and time. Psychiatric:         Behavior: Behavior normal.         Assessment and Plan:      Diagnosis Orders   1. Sequelae, post-stroke     2. Chronic pain syndrome          Discussed with MRI of the LS spine. Refill flexeril. UDS today     FF up in 3 months. All questions were answered and imaging studies reviewed with the patient. I have reviewed the chief complaint and HPI including the [facility] and Vital documentation by my staff and I agree with their documentation and have added where applicable. Time spent with patient was 25 minutes. More than 50% was spent counseling/coordinating the patient's care.        Everardo Vinson MD   Spine Medicine/PM&R

## 2021-08-06 RX ORDER — ACETAMINOPHEN AND CODEINE PHOSPHATE 300; 30 MG/1; MG/1
TABLET ORAL
Status: CANCELLED | OUTPATIENT
Start: 2021-08-06

## 2021-08-06 RX ORDER — CYCLOBENZAPRINE HCL 10 MG
TABLET ORAL
Qty: 30 TABLET | Refills: 0 | Status: CANCELLED | OUTPATIENT
Start: 2021-08-06

## 2021-08-16 ENCOUNTER — OFFICE VISIT (OUTPATIENT)
Dept: FAMILY MEDICINE CLINIC | Age: 51
End: 2021-08-16
Payer: MEDICARE

## 2021-08-16 VITALS
DIASTOLIC BLOOD PRESSURE: 80 MMHG | HEART RATE: 91 BPM | WEIGHT: 182 LBS | HEIGHT: 69 IN | SYSTOLIC BLOOD PRESSURE: 122 MMHG | BODY MASS INDEX: 26.96 KG/M2

## 2021-08-16 DIAGNOSIS — M54.50 CHRONIC MIDLINE LOW BACK PAIN WITHOUT SCIATICA: ICD-10-CM

## 2021-08-16 DIAGNOSIS — K21.9 GASTROESOPHAGEAL REFLUX DISEASE WITHOUT ESOPHAGITIS: ICD-10-CM

## 2021-08-16 DIAGNOSIS — R80.9 CONTROLLED TYPE 2 DIABETES MELLITUS WITH MICROALBUMINURIA, WITH LONG-TERM CURRENT USE OF INSULIN (HCC): ICD-10-CM

## 2021-08-16 DIAGNOSIS — E55.9 VITAMIN D DEFICIENCY: ICD-10-CM

## 2021-08-16 DIAGNOSIS — E11.29 CONTROLLED TYPE 2 DIABETES MELLITUS WITH MICROALBUMINURIA, WITH LONG-TERM CURRENT USE OF INSULIN (HCC): ICD-10-CM

## 2021-08-16 DIAGNOSIS — G89.29 CHRONIC MIDLINE LOW BACK PAIN WITHOUT SCIATICA: ICD-10-CM

## 2021-08-16 DIAGNOSIS — I10 BENIGN ESSENTIAL HTN: Primary | ICD-10-CM

## 2021-08-16 DIAGNOSIS — E78.2 MIXED HYPERLIPIDEMIA: ICD-10-CM

## 2021-08-16 DIAGNOSIS — Z12.5 PROSTATE CANCER SCREENING: ICD-10-CM

## 2021-08-16 DIAGNOSIS — Z79.4 CONTROLLED TYPE 2 DIABETES MELLITUS WITH MICROALBUMINURIA, WITH LONG-TERM CURRENT USE OF INSULIN (HCC): ICD-10-CM

## 2021-08-16 DIAGNOSIS — R80.9 MICROALBUMINURIA: ICD-10-CM

## 2021-08-16 PROCEDURE — 99214 OFFICE O/P EST MOD 30 MIN: CPT | Performed by: INTERNAL MEDICINE

## 2021-08-16 SDOH — ECONOMIC STABILITY: FOOD INSECURITY: WITHIN THE PAST 12 MONTHS, THE FOOD YOU BOUGHT JUST DIDN'T LAST AND YOU DIDN'T HAVE MONEY TO GET MORE.: NEVER TRUE

## 2021-08-16 SDOH — ECONOMIC STABILITY: FOOD INSECURITY: WITHIN THE PAST 12 MONTHS, YOU WORRIED THAT YOUR FOOD WOULD RUN OUT BEFORE YOU GOT MONEY TO BUY MORE.: NEVER TRUE

## 2021-08-16 ASSESSMENT — ENCOUNTER SYMPTOMS
NAUSEA: 0
DIARRHEA: 0
BACK PAIN: 1
ABDOMINAL PAIN: 0
SHORTNESS OF BREATH: 0
RESPIRATORY NEGATIVE: 1
BLOOD IN STOOL: 0
SORE THROAT: 0
CONSTIPATION: 0

## 2021-08-16 ASSESSMENT — SOCIAL DETERMINANTS OF HEALTH (SDOH): HOW HARD IS IT FOR YOU TO PAY FOR THE VERY BASICS LIKE FOOD, HOUSING, MEDICAL CARE, AND HEATING?: NOT HARD AT ALL

## 2021-08-16 NOTE — PATIENT INSTRUCTIONS
Survey: You may be receiving a survey from [employer] regarding your visit today. You may get this in the mail, through your MyChart or in your email. Please complete the survey to enable us to provide the highest quality of care to you and your family. Please also, mention our names. If you cannot score us as very good (5 Stars) on any question, please feel free to call the office to discuss how we could have made your experience exceptional.      Thank You! MD Queenie Oneal, TONG Mar, Carmelo Santos, ELIZAN RN    Damion Long, [address] [place] [address]      1. Benign essential HTN  Controlled on the current medication.  - Comprehensive Metabolic Panel; Future    2. Gastroesophageal reflux disease without esophagitis  Controlled on Protonix. 3. Mixed hyperlipidemia  Controlled on statin. - Comprehensive Metabolic Panel; Future  - Lipid Panel; Future    4. Vitamin D deficiency  Controlled on Vitamin D.    5. Microalbuminuria  On Losartan    6. Chronic midline low back pain without sciatica  Following with Dr. Fay Dutton. 7. Controlled type 2 diabetes mellitus with microalbuminuria, with long-term current use of insulin (HCC)  Controlled on the current insulin dose. Obtain labs approximately one week prior to the office appointment. Please fast for 12 hours prior to obtaining the labs. Water or black coffee (no cream or sugar) is allowed prior to the the labs. - Comprehensive Metabolic Panel; Future  - Hemoglobin A1C; Future    8. Prostate cancer screening  PSA with next labs. - PSA screening; Future      Preston was instructed to follow up in the clinic in 6 months for check up or as needed with any medical issues.

## 2021-08-16 NOTE — PROGRESS NOTES
Amena Hicks (:  1970) is a 49 y.o. male,Established patient, here for evaluation of the following chief complaint(s):  Hypertension (check up, recently had MRI on back at [facility]), Hyperlipidemia, Diabetes Mellitus, Gastroesophageal Reflux, and Health Maintenance         ASSESSMENT/PLAN:  1. Benign essential HTN  -     Comprehensive Metabolic Panel; Future  2. Gastroesophageal reflux disease without esophagitis  3. Mixed hyperlipidemia  -     Comprehensive Metabolic Panel; Future  -     Lipid Panel; Future  4. Vitamin D deficiency  5. Microalbuminuria  6. Chronic midline low back pain without sciatica  7. Controlled type 2 diabetes mellitus with microalbuminuria, with long-term current use of insulin (HCC)  -     Comprehensive Metabolic Panel; Future  -     Hemoglobin A1C; Future  8. Prostate cancer screening  -     PSA screening; Future      Plan:  1. Benign essential HTN  Controlled on the current medication.  - Comprehensive Metabolic Panel; Future    2. Gastroesophageal reflux disease without esophagitis  Controlled on Protonix. 3. Mixed hyperlipidemia  Controlled on statin. - Comprehensive Metabolic Panel; Future  - Lipid Panel; Future    4. Vitamin D deficiency  Controlled on Vitamin D.    5. Microalbuminuria  On Losartan    6. Chronic midline low back pain without sciatica  Following with Dr. Mello Nelson. 7. Controlled type 2 diabetes mellitus with microalbuminuria, with long-term current use of insulin (HCC)  Controlled on the current insulin dose. Obtain labs approximately one week prior to the office appointment. Please fast for 12 hours prior to obtaining the labs. Water or black coffee (no cream or sugar) is allowed prior to the the labs. - Comprehensive Metabolic Panel; Future  - Hemoglobin A1C; Future    8. Prostate cancer screening  PSA with next labs. - PSA screening;  Future      Preston was instructed to follow up in the clinic in 6 months for check up or as needed with any medical issues. Subjective   SUBJECTIVE/OBJECTIVE:  Royann Schlatter presents for a check up on his medical conditions HTN, Hyperlipidemia, DM II, GERD. Royann Schlatter denies new problems. Medications were reviewed with Royann Schlatter, he is  tolerating the medication. Bowels are not regular (occasional diarrhea). There has not been rectal bleeding. Royann Schlatter denies urinary complications, the urine stream is good. Preston denies chest pain and denies increasing shortness of breath. Following with Dr. Vitaliy Azul for chronic back / leg pain. MRI of the lumbar spine reviewed from [place] [address].       Past Medical History:  No date: GERD (gastroesophageal reflux disease)  No date: Hyperlipidemia  No date: Hypertension  No date: Microalbuminuria  No date: PAD (peripheral artery disease) ([facility])    Past Surgical History:  No date: APPENDECTOMY  2012: ARTERIAL BYPASS SURGRY  2006: CARDIAC SURGERY  2019: COLECTOMY      Comment:  sigmoid resection ([facility])  2020: HERNIA REPAIR      Comment:  Dr Fatou Urbina  2019: SMALL INTESTINE SURGERY      Comment:  small bowel resection ([facility])    Social History    Socioeconomic History      Marital status:       Spouse name: Not on file      Number of children: Not on file      Years of education: Not on file      Highest education level: Not on file    Occupational History      Occupation: disability    Tobacco Use      Smoking status: Current Some Day Smoker        Packs/day: 0.25        Years: 20.00        Pack years: 5        Types: Cigarettes        Quit date: 10/27/2011        Years since quittin.8      Smokeless tobacco: Never Used      Tobacco comment: wife smokes, passive    Substance and Sexual Activity      Alcohol use: Not on file      Drug use: Not on file      Sexual activity: Not on file    Other Topics      Concerns:        Not on file    Social History Narrative      Not on file    Social Determinants of Health  Financial Resource Strain: Low Risk       Difficulty of Paying Living Expenses: Not hard at all  Food Insecurity: No Food Insecurity      Worried About [facility] in the Last Year: Never true      Ran Out of Food in the Last Year: Never true  Transportation Needs:       Lack of Transportation (Medical):       Lack of Transportation (Non-Medical):   Physical Activity:       Days of Exercise per Week:       Minutes of Exercise per Session:   Stress:       Feeling of Stress :   Social Connections:       Frequency of Communication with Friends and Family:       Frequency of Social Gatherings with Friends and Family:       Attends Confucianism Services:       Active Member of Clubs or Organizations:       Attends Club or Organization Meetings:       Marital Status:   Intimate Partner Violence:       Fear of Current or Ex-Partner:       Emotionally Abused:       Physically Abused:       Sexually Abused:     No family history on file.       Current Outpatient Medications on File Prior to Visit:  Gore Ket 100 UNIT/ML injection pen, Inject 75 Units into the skin nightly, Disp: 15 mL, Rfl: 3  hydrOXYzine (ATARAX) 25 MG tablet, TAKE 1 TABLET BY MOUTH THREE TIMES DAILY AS NEEDED FOR ANXIETY, Disp: 90 tablet, Rfl: 1  cilostazol (PLETAL) 100 MG tablet, TAKE 1 TABLET BY MOUTH TWICE DAILY, Disp: 60 tablet, Rfl: 5  amLODIPine (NORVASC) 5 MG tablet, TAKE 1 TABLET BY MOUTH EVERY DAY, Disp: 30 tablet, Rfl: 5  cyclobenzaprine (FLEXERIL) 10 MG tablet, TAKE 1 TABLET BY MOUTH THREE TIMES DAILY AS NEEDED FOR MUSCLE SPASMS, Disp: 30 tablet, Rfl: 0  rosuvastatin (CRESTOR) 10 MG tablet, Take 1 tablet by mouth nightly, Disp: 30 tablet, Rfl: 5  pantoprazole (PROTONIX) 40 MG tablet, TAKE 1 TABLET BY MOUTH TWICE DAILY, Disp: 60 tablet, Rfl: 3  acetaminophen-codeine (TYLENOL #3) 300-30 MG per tablet, TAKE 1 TABLET BY MOUTH EVERY EIGHT hours, Disp: , Rfl:   amoxicillin (AMOXIL) 500 MG capsule, TAKE 1 CAPSULE BY MOUTH EVERY EIGHT hours UNTIL GONE, Disp: , Rfl:   insulin lispro, 1 Unit Dial, (HUMALOG KWIKPEN) 100 UNIT/ML SOPN, Inject 3 Units into the skin 3 times daily (before meals), Disp: 5 pen, Rfl: 3  metoprolol tartrate (LOPRESSOR) 50 MG tablet, TAKE 1 TABLET BY MOUTH TWICE DAILY, Disp: 60 tablet, Rfl: 5  losartan (COZAAR) 100 MG tablet, TAKE 1 TABLET BY MOUTH EVERY DAY, Disp: 30 tablet, Rfl: 5  metFORMIN (GLUCOPHAGE) 1000 MG tablet, Take 1 tablet by mouth 2 times daily (with meals), Disp: 60 tablet, Rfl: 5  venlafaxine (EFFEXOR XR) 75 MG extended release capsule, TAKE 1 CAPSULE BY MOUTH EVERY DAY, Disp: 30 capsule, Rfl: 2  Cholecalciferol (VITAMIN D) 50 MCG (2000 UT) CAPS capsule, Take by mouth, Disp: , Rfl:   prazosin (MINIPRESS) 1 MG capsule, TAKE 1 CAPSULE BY MOUTH EVERY EVENING, Disp: 30 capsule, Rfl: 5  Drug [place] Unilet Lancets 28G MISC, Test TID and prn. DX E11.9, Z79.4, Disp: 100 each, Rfl: 5  blood glucose monitor strips, 1 strip by Other route 3 times daily as needed for Other (as needed) Dx E11.9, Z79.4 [facility] Unilet Test strips, Disp: 100 strip, Rfl: 5  Insulin Syringe-Needle U-100 30G X 1/2\" 0.5 ML MISC, Dx E11.9, Z79.4 injecting insulin QD, Disp: 100 each, Rfl: 3  magnesium oxide (MAG-OX) 400 (241.3 Mg) MG TABS tablet, TAKE 1 TABLET BY MOUTH DAILY, Disp: , Rfl:   metoclopramide (REGLAN) 10 MG tablet, Take 10 mg by mouth, Disp: , Rfl:   hyoscyamine (ANASPAZ;LEVSIN) 125 MCG tablet, Take 125 mcg by mouth every 4 hours as needed for Cramping, Disp: , Rfl:   loperamide (IMODIUM) 2 MG capsule, Take 2 mg by mouth 4 times daily as needed for Diarrhea, Disp: , Rfl:   busPIRone (BUSPAR) 10 MG tablet, TAKE 1 TABLET BY MOUTH TWICE DAILY, Disp: 60 tablet, Rfl: 1  TRUE METRIX BLOOD GLUCOSE TEST strip, Test BS TID and prn., Disp: 50 each, Rfl: 11  aspirin 81 MG EC tablet, Take 81 mg by mouth daily. , Disp: , Rfl:     No current facility-administered medications on file prior to visit.        -- Oxycodone-Acetaminophen     --  Other reaction(s): Mental Status Change      Lab Results Component                Value               Date                       NA                       134 (L)             10/09/2020                 K                        4.1                 04/09/2021                 CL                       98                  10/09/2020                 CO2                      21                  10/09/2020                 BUN                      17                  10/09/2020                 CREATININE               1.08                04/09/2021                 GLUCOSE                  199 (H)             10/09/2020                 CALCIUM                  10.0                10/09/2020                 PROT                     7.0                 10/09/2020                 LABALBU                  4.8                 10/09/2020                 BILITOT                  0.45                10/09/2020                 ALKPHOS                  105                 10/09/2020                 AST                      47 (H)              10/09/2020                 ALT                      74 (H)              10/09/2020                 LABGLOM                  >60                 10/09/2020                 GFRAA                    >60                 10/09/2020              Lab Results       Component                Value               Date                       LABA1C                   9.7                 04/09/2021            Lab Results       Component                Value               Date                       EAG                      203                 01/14/2019              Lab Results       Component                Value               Date                       CHOL                     197                 04/09/2021                 CHOL                     179                 12/22/2020                 CHOL                     198                 10/09/2020            Lab Results       Component                Value               Date                       TRIG 367                 04/09/2021                 TRIG                     295                 12/22/2020                 TRIG                     388 (H)             10/09/2020            Lab Results       Component                Value               Date                       HDL                      33 (A)              04/09/2021                 HDL                      39                  12/22/2020                 HDL                      35 (L)              10/09/2020            Lab Results       Component                Value               Date                       LDLCHOLESTEROL           85                  10/09/2020                 LDLCHOLESTEROL           139 (H)             01/14/2019                 LDLCHOLESTEROL           129                 10/08/2018                 LDLCALC                  91                  04/09/2021                 LDLCALC                  91                  12/22/2020                 LDLCALC                  93                  05/08/2020            Lab Results       Component                Value               Date                       VLDL                     49                  12/22/2020                 VLDL                                         10/09/2020             NOT REPORTED (H)       VLDL                     54                  05/08/2020            Lab Results       Component                Value               Date                       CHOLHDLRATIO             6.0                 04/09/2021                 CHOLHDLRATIO             5.7 (H)             10/09/2020                 CHOLHDLRATIO             6.3 (H)             01/14/2019                              Hypertension  This is a chronic problem. The current episode started more than 1 year ago. The problem is unchanged. The problem is controlled. Pertinent negatives include no chest pain, neck pain, palpitations or shortness of breath.  Past treatments include calcium channel blockers, angiotensin blockers and alpha 1 blockers. The current treatment provides significant improvement. There are no compliance problems. There is no history of angina. Hyperlipidemia  This is a chronic problem. The current episode started more than 1 year ago. The problem is controlled. Recent lipid tests were reviewed and are normal. Pertinent negatives include no chest pain, myalgias or shortness of breath. Current antihyperlipidemic treatment includes statins. The current treatment provides significant improvement of lipids. Gastroesophageal Reflux  He reports no abdominal pain, no chest pain, no nausea or no sore throat. This is a chronic problem. The current episode started more than 1 year ago. The problem occurs rarely. The problem has been unchanged. He has tried a PPI for the symptoms. The treatment provided significant relief. Review of Systems   Constitutional: Negative. HENT: Negative for congestion, ear pain, postnasal drip, sneezing and sore throat. Eyes: Negative for visual disturbance. Respiratory: Negative. Negative for shortness of breath. Cardiovascular: Negative for chest pain, palpitations and leg swelling. Gastrointestinal: Negative for abdominal pain, blood in stool, constipation, diarrhea and nausea. Genitourinary: Negative for difficulty urinating, dysuria, frequency and urgency. Musculoskeletal: Positive for arthralgias and back pain. Negative for joint swelling, myalgias, neck pain and neck stiffness. Skin: Negative. Neurological: Negative for syncope. Psychiatric/Behavioral: Negative. Objective   Physical Exam  Vitals and nursing note reviewed. Constitutional:       Appearance: He is well-developed. HENT:      Head: Atraumatic. Eyes:      Conjunctiva/sclera: Conjunctivae normal.   Cardiovascular:      Rate and Rhythm: Normal rate and regular rhythm. Heart sounds: Normal heart sounds.    Pulmonary:      Effort: Pulmonary effort is normal.      Breath sounds: Normal breath sounds. Abdominal:      Palpations: Abdomen is soft. Tenderness: There is no abdominal tenderness. Musculoskeletal:      Cervical back: Normal range of motion and neck supple. Lymphadenopathy:      Cervical: No cervical adenopathy. Skin:     Findings: No rash. Neurological:      Mental Status: He is alert. Psychiatric:         Behavior: Behavior normal.         Thought Content: Thought content normal.                  An electronic signature was used to authenticate this note.     --Antonio Beavers MD

## 2021-08-18 DIAGNOSIS — G89.29 CHRONIC MIDLINE LOW BACK PAIN WITHOUT SCIATICA: Primary | ICD-10-CM

## 2021-08-18 DIAGNOSIS — M54.50 CHRONIC MIDLINE LOW BACK PAIN WITHOUT SCIATICA: Primary | ICD-10-CM

## 2021-08-18 NOTE — TELEPHONE ENCOUNTER
Patient calling regarding needing a refill of Tylenol #3. Last OV: 7/30/21  Last RX: 4/8/21   Next scheduled apt: 10/29/21    Okay for refill?  Please advise, thank you

## 2021-08-20 RX ORDER — ACETAMINOPHEN AND CODEINE PHOSPHATE 300; 30 MG/1; MG/1
1 TABLET ORAL 3 TIMES DAILY PRN
Qty: 21 TABLET | Refills: 0 | Status: SHIPPED | OUTPATIENT
Start: 2021-08-20 | End: 2021-08-27

## 2021-09-10 DIAGNOSIS — G89.29 CHRONIC MIDLINE LOW BACK PAIN WITHOUT SCIATICA: ICD-10-CM

## 2021-09-10 DIAGNOSIS — M54.50 CHRONIC MIDLINE LOW BACK PAIN WITHOUT SCIATICA: ICD-10-CM

## 2021-09-10 RX ORDER — ACETAMINOPHEN AND CODEINE PHOSPHATE 300; 30 MG/1; MG/1
1 TABLET ORAL 3 TIMES DAILY PRN
Qty: 21 TABLET | Refills: 0 | OUTPATIENT
Start: 2021-09-10 | End: 2021-09-17

## 2021-09-10 NOTE — TELEPHONE ENCOUNTER
Pending medication    Health Maintenance   Topic Date Due    Hepatitis C screen  Never done    Hepatitis B vaccine (1 of 3 - Risk 3-dose series) Never done    Colon cancer screen colonoscopy  Never done    Shingles Vaccine (1 of 2) Never done    A1C test (Diabetic or Prediabetic)  07/09/2021    Flu vaccine (1) 09/01/2021    Diabetic foot exam  10/09/2021    Diabetic microalbuminuria test  10/09/2021    Annual Wellness Visit (AWV)  10/28/2021    Lipid screen  04/09/2022    Potassium monitoring  04/09/2022    Creatinine monitoring  04/09/2022    Diabetic retinal exam  04/27/2022    DTaP/Tdap/Td vaccine (2 - Td or Tdap) 11/19/2022    Pneumococcal 0-64 years Vaccine (2 of 2 - PPSV23) 04/15/2036    COVID-19 Vaccine  Completed    HIV screen  Completed    Hepatitis A vaccine  Aged Out    Hib vaccine  Aged Out    Meningococcal (ACWY) vaccine  Aged Out             (applicable per patient's age: Cancer Screenings, Depression Screening, Fall Risk Screening, Immunizations)    Hemoglobin A1C (%)   Date Value   04/09/2021 9.7   12/22/2020 10.1   10/09/2020 10.6     Microalb/Crt.  Ratio (mcg/mg creat)   Date Value   10/09/2020 24 (H)     LDL Cholesterol (mg/dL)   Date Value   10/09/2020 85     LDL Calculated (mg/dL)   Date Value   04/09/2021 91     AST (U/L)   Date Value   10/09/2020 47 (H)     ALT (U/L)   Date Value   10/09/2020 74 (H)     BUN (mg/dL)   Date Value   10/09/2020 17      (goal A1C is < 7)   (goal LDL is <100) need 30-50% reduction from baseline     BP Readings from Last 3 Encounters:   08/16/21 122/80   07/30/21 124/88   06/25/21 136/84    (goal /80)      All Future Testing planned in [facility]:  Lab Frequency Next Occurrence   Comprehensive Metabolic Panel Once 21/15/0549   Hemoglobin A1C Once 10/30/2021   Lipid Panel Once 10/30/2021   Microalbumin / Creatinine Urine Ratio Once 10/30/2021   MRI LUMBAR SPINE [address] Once 06/25/2021   Comprehensive Metabolic Panel Once 94/17/3813 Hemoglobin A1C Once 02/16/2022   Lipid Panel Once 02/16/2022   PSA screening Once 02/16/2022       Next Visit Date:  Future Appointments   Date Time Provider Dwaine Kae   10/28/2021  2:45 PM Olesya Kuo MD [facility] [address] [employer] [facility]   10/29/2021 12:30 PM Melly Tejeda MD [facility] [address] [employer] [facility]   2/21/2022 10:30 AM Olesya Kuo MD [id number]            Patient Active Problem List:     GERD (gastroesophageal reflux disease)     Carpal tunnel syndrome of right wrist     Benign essential HTN     Diabetic peripheral neuropathy (HCC)     Mixed hyperlipidemia     Vitamin D deficiency     Leg pain, left     Microalbuminuria     PVD (peripheral vascular disease) ([address]     Status post partial resection of colon     Cigarette smoker     Left carotid artery stenosis     Pain in upper limb     Situational mixed anxiety and depressive disorder     History of coronary artery bypass graft     H/O heart artery stent     History of DVT (deep vein thrombosis)

## 2021-09-13 ENCOUNTER — TELEPHONE (OUTPATIENT)
Dept: FAMILY MEDICINE CLINIC | Age: 51
End: 2021-09-13

## 2021-09-13 NOTE — TELEPHONE ENCOUNTER
Pt called stating Dr. Duong Parnell office called stating he is not a long term pain management physician. Pt questions if this is something you can do for him? He also states he will go wherever he needs to for treatment but he will need long term treatment. Call back requested. Health Maintenance   Topic Date Due    Hepatitis C screen  Never done    Hepatitis B vaccine (1 of 3 - Risk 3-dose series) Never done    Colon cancer screen colonoscopy  Never done    Shingles Vaccine (1 of 2) Never done    A1C test (Diabetic or Prediabetic)  07/09/2021    Flu vaccine (1) 09/01/2021    Diabetic foot exam  10/09/2021    Diabetic microalbuminuria test  10/09/2021    Annual Wellness Visit (AWV)  10/28/2021    Lipid screen  04/09/2022    Potassium monitoring  04/09/2022    Creatinine monitoring  04/09/2022    Diabetic retinal exam  04/27/2022    DTaP/Tdap/Td vaccine (2 - Td or Tdap) 11/19/2022    Pneumococcal 0-64 years Vaccine (2 of 2 - PPSV23) 04/15/2036    COVID-19 Vaccine  Completed    HIV screen  Completed    Hepatitis A vaccine  Aged Out    Hib vaccine  Aged Out    Meningococcal (ACWY) vaccine  Aged Out             (applicable per patient's age: Cancer Screenings, Depression Screening, Fall Risk Screening, Immunizations)    Hemoglobin A1C (%)   Date Value   04/09/2021 9.7   12/22/2020 10.1   10/09/2020 10.6     Microalb/Crt.  Ratio (mcg/mg creat)   Date Value   10/09/2020 24 (H)     LDL Cholesterol (mg/dL)   Date Value   10/09/2020 85     LDL Calculated (mg/dL)   Date Value   04/09/2021 91     AST (U/L)   Date Value   10/09/2020 47 (H)     ALT (U/L)   Date Value   10/09/2020 74 (H)     BUN (mg/dL)   Date Value   10/09/2020 17      (goal A1C is < 7)   (goal LDL is <100) need 30-50% reduction from baseline     BP Readings from Last 3 Encounters:   08/16/21 122/80   07/30/21 124/88   06/25/21 136/84    (goal /80)      All Future Testing planned in [facility]:  Lab Frequency Next Occurrence

## 2021-09-14 NOTE — TELEPHONE ENCOUNTER
I called spoke to Jl @ the [address] [place] [address] office. She states she is the one who spoke to the patient and Dr. Mike Gray will not prescribe Tylenol # 3 as a long term medication. This is what the patient was requesting. He had previously given the patient a short term prescription.

## 2021-09-14 NOTE — TELEPHONE ENCOUNTER
Let Nela Jimenez know he will not prescribe long term narcotics which I will not prescribe as well. I can prescribe gabapentin / muscle relaxants as needed or otherwise refer to another pain mgt physician.

## 2021-09-14 NOTE — TELEPHONE ENCOUNTER
Preston informed he will not prescribe long term narcotics which I will not prescribe as well. I can prescribe gabapentin / muscle relaxants as needed or otherwise refer to another pain mgt physician.

## 2021-09-20 RX ORDER — LOSARTAN POTASSIUM 100 MG/1
100 TABLET ORAL DAILY
Qty: 30 TABLET | Refills: 5 | Status: SHIPPED | OUTPATIENT
Start: 2021-09-20 | End: 2022-04-27 | Stop reason: SDUPTHER

## 2021-09-20 NOTE — TELEPHONE ENCOUNTER
Health Maintenance   Topic Date Due    Hepatitis C screen  Never done    Hepatitis B vaccine (1 of 3 - Risk 3-dose series) Never done    Colon cancer screen colonoscopy  Never done    Shingles Vaccine (1 of 2) Never done    A1C test (Diabetic or Prediabetic)  07/09/2021    Flu vaccine (1) 09/01/2021    Diabetic foot exam  10/09/2021    Diabetic microalbuminuria test  10/09/2021    Annual Wellness Visit (AWV)  10/28/2021    Lipid screen  04/09/2022    Potassium monitoring  04/09/2022    Creatinine monitoring  04/09/2022    Diabetic retinal exam  04/27/2022    DTaP/Tdap/Td vaccine (2 - Td or Tdap) 11/19/2022    Pneumococcal 0-64 years Vaccine (2 of 2 - PPSV23) 04/15/2036    COVID-19 Vaccine  Completed    HIV screen  Completed    Hepatitis A vaccine  Aged Out    Hib vaccine  Aged Out    Meningococcal (ACWY) vaccine  Aged Out             (applicable per patient's age: Cancer Screenings, Depression Screening, Fall Risk Screening, Immunizations)    Hemoglobin A1C (%)   Date Value   04/09/2021 9.7   12/22/2020 10.1   10/09/2020 10.6     Microalb/Crt.  Ratio (mcg/mg creat)   Date Value   10/09/2020 24 (H)     LDL Cholesterol (mg/dL)   Date Value   10/09/2020 85     LDL Calculated (mg/dL)   Date Value   04/09/2021 91     AST (U/L)   Date Value   10/09/2020 47 (H)     ALT (U/L)   Date Value   10/09/2020 74 (H)     BUN (mg/dL)   Date Value   10/09/2020 17      (goal A1C is < 7)   (goal LDL is <100) need 30-50% reduction from baseline     BP Readings from Last 3 Encounters:   08/16/21 122/80   07/30/21 124/88   06/25/21 136/84    (goal /80)      All Future Testing planned in [facility]:  Lab Frequency Next Occurrence   Comprehensive Metabolic Panel Once 89/19/1554   Hemoglobin A1C Once 10/30/2021   Lipid Panel Once 10/30/2021   Microalbumin / Creatinine Urine Ratio Once 10/30/2021   MRI LUMBAR SPINE [address] Once 06/25/2021   Comprehensive Metabolic Panel Once 72/64/3413   Hemoglobin A1C Once 02/16/2022   Lipid Panel Once 02/16/2022   PSA screening Once 02/16/2022       Next Visit Date:  Future Appointments   Date Time Provider Dwaine Pal   9[facility]21/2021  1[facility] [address] [facility]   10/28/2021  2[facility] [address] [facility]   10/29/2021 12:30 PM Martina Tellez MD [facility] [address] [facility]   2/21/2022 10:30 AM MD Dougie Wood [address] [facility]            Patient Active Problem List:     GERD (gastroesophageal reflux disease)     Carpal tunnel syndrome of right wrist     Benign essential HTN     Diabetic peripheral neuropathy (HCC)     Mixed hyperlipidemia     Vitamin D deficiency     Leg pain, left     Microalbuminuria     PVD (peripheral vascular disease) ([facility]     Status post partial resection of colon     Cigarette smoker     Left carotid artery stenosis     Pain in upper limb     Situational mixed anxiety and depressive disorder     History of coronary artery bypass graft     H/O heart artery stent     History of DVT (deep vein thrombosis)

## 2021-09-21 ENCOUNTER — OFFICE VISIT (OUTPATIENT)
Dept: FAMILY MEDICINE CLINIC | Age: 51
End: 2021-09-21
Payer: MEDICARE

## 2021-09-21 VITALS
HEIGHT: 69 IN | DIASTOLIC BLOOD PRESSURE: 88 MMHG | WEIGHT: 182 LBS | SYSTOLIC BLOOD PRESSURE: 134 MMHG | BODY MASS INDEX: 26.96 KG/M2

## 2021-09-21 DIAGNOSIS — E11.42 DIABETIC PERIPHERAL NEUROPATHY (HCC): ICD-10-CM

## 2021-09-21 DIAGNOSIS — M54.50 CHRONIC MIDLINE LOW BACK PAIN WITHOUT SCIATICA: Primary | ICD-10-CM

## 2021-09-21 DIAGNOSIS — G89.29 CHRONIC MIDLINE LOW BACK PAIN WITHOUT SCIATICA: Primary | ICD-10-CM

## 2021-09-21 DIAGNOSIS — Z23 NEED FOR INFLUENZA VACCINATION: ICD-10-CM

## 2021-09-21 PROCEDURE — 90674 CCIIV4 VAC NO PRSV 0.5 ML IM: CPT | Performed by: INTERNAL MEDICINE

## 2021-09-21 PROCEDURE — G0008 ADMIN INFLUENZA VIRUS VAC: HCPCS | Performed by: INTERNAL MEDICINE

## 2021-09-21 PROCEDURE — 99213 OFFICE O/P EST LOW 20 MIN: CPT | Performed by: INTERNAL MEDICINE

## 2021-09-21 RX ORDER — CYCLOBENZAPRINE HCL 10 MG
10 TABLET ORAL NIGHTLY PRN
Qty: 90 TABLET | Refills: 0 | Status: SHIPPED | OUTPATIENT
Start: 2021-09-21 | End: 2022-05-23

## 2021-09-21 RX ORDER — GABAPENTIN 300 MG/1
300 CAPSULE ORAL 3 TIMES DAILY
Qty: 90 CAPSULE | Refills: 2 | Status: SHIPPED | OUTPATIENT
Start: 2021-09-21 | End: 2022-02-25 | Stop reason: SDUPTHER

## 2021-09-21 ASSESSMENT — ENCOUNTER SYMPTOMS
ABDOMINAL PAIN: 0
BLOOD IN STOOL: 0
DIARRHEA: 0
SORE THROAT: 0
RESPIRATORY NEGATIVE: 1
BACK PAIN: 1
NAUSEA: 0
CONSTIPATION: 0

## 2021-09-21 ASSESSMENT — PATIENT HEALTH QUESTIONNAIRE - PHQ9
1. LITTLE INTEREST OR PLEASURE IN DOING THINGS: 0
SUM OF ALL RESPONSES TO PHQ QUESTIONS 1-9: 0
SUM OF ALL RESPONSES TO PHQ9 QUESTIONS 1 & 2: 0
SUM OF ALL RESPONSES TO PHQ QUESTIONS 1-9: 0
SUM OF ALL RESPONSES TO PHQ QUESTIONS 1-9: 0
2. FEELING DOWN, DEPRESSED OR HOPELESS: 0

## 2021-09-21 NOTE — PATIENT INSTRUCTIONS
1. Need for influenza vaccination  Preston was given an influenza vaccine today. - INFLUENZA, MDCK QUADV, 2 YRS AND OLDER, IM, PF, PREFILL SYR OR SDV, 0.5ML (FLUCELVAX QUADV, PF)    2. Chronic midline low back pain without sciatica  Take Gabapentin 300 mg by mouth up to 3 times a day. Use Flexeril 10 mg every 8 hours as needed for spasms.    - gabapentin (NEURONTIN) 300 MG capsule; Take 1 capsule by mouth 3 times daily for 91 days. Dispense: 90 capsule; Refill: 2  - cyclobenzaprine (FLEXERIL) 10 MG tablet; Take 1 tablet by mouth nightly as needed for Muscle spasms  Dispense: 90 tablet; Refill: 0    3. Diabetic peripheral neuropathy (HCC)    - gabapentin (NEURONTIN) 300 MG capsule; Take 1 capsule by mouth 3 times daily for 91 days. Dispense: 90 capsule; Refill: 2    Mickey Harry was instructed to follow up in the clinic in 3 months for check up or as needed with any medical issues.

## 2021-09-21 NOTE — PROGRESS NOTES
Angela Young (:  1970) is a 49 y.o. male,Established patient, here for evaluation of the following chief complaint(s):  Back Pain (left hip/back pain. Has followed with Dr John Bush. Has treated on tylenol #3 and muscle relaxer. Stroke 7 yrs ago.) and Leg Pain (left leg pain)         ASSESSMENT/PLAN:  1. Chronic midline low back pain without sciatica  -     gabapentin (NEURONTIN) 300 MG capsule; Take 1 capsule by mouth 3 times daily for 91 days. , Disp-90 capsule, R-2Normal  -     cyclobenzaprine (FLEXERIL) 10 MG tablet; Take 1 tablet by mouth nightly as needed for Muscle spasms, Disp-90 tablet, R-0Normal  2. Need for influenza vaccination  -     INFLUENZA, MDCK QUADV, 2 YRS AND OLDER, IM, PF, PREFILL SYR OR SDV, 0.5ML (FLUCELVAX QUADV, PF)  3. Diabetic peripheral neuropathy (HCC)  -     gabapentin (NEURONTIN) 300 MG capsule; Take 1 capsule by mouth 3 times daily for 91 days. , Disp-90 capsule, R-2Normal      Plan:  1. Need for influenza vaccination  Preston was given an influenza vaccine today. - INFLUENZA, MDCK QUADV, 2 YRS AND OLDER, IM, PF, PREFILL SYR OR SDV, 0.5ML (FLUCELVAX QUADV, PF)    2. Chronic midline low back pain without sciatica  Take Gabapentin 300 mg by mouth up to 3 times a day. Use Flexeril 10 mg every 8 hours as needed for spasms.    - gabapentin (NEURONTIN) 300 MG capsule; Take 1 capsule by mouth 3 times daily for 91 days. Dispense: 90 capsule; Refill: 2  - cyclobenzaprine (FLEXERIL) 10 MG tablet; Take 1 tablet by mouth nightly as needed for Muscle spasms  Dispense: 90 tablet; Refill: 0    3. Diabetic peripheral neuropathy (HCC)    - gabapentin (NEURONTIN) 300 MG capsule; Take 1 capsule by mouth 3 times daily for 91 days. Dispense: 90 capsule; Refill: 2    Angela Young was instructed to follow up in the clinic in 3 months for check up or as needed with any medical issues.                Subjective   SUBJECTIVE/OBJECTIVE:  Erin Saeed states he would like me to take over the Rx of his medication for back / leg pain. He has been taking Flexeril and Gabapentin. He has been on Gabapentin in the past but not currently on it. Most of his pain in low back that radiates into the left leg. He does also have chronic numbness in his forearms after CVA. Review of Systems   Constitutional: Negative. HENT: Negative for congestion, ear pain, postnasal drip, sneezing and sore throat. Eyes: Negative for visual disturbance. Respiratory: Negative. Cardiovascular: Negative for chest pain, palpitations and leg swelling. Gastrointestinal: Negative for abdominal pain, blood in stool, constipation, diarrhea and nausea. Genitourinary: Negative for difficulty urinating, dysuria, frequency and urgency. Musculoskeletal: Positive for arthralgias and back pain. Negative for joint swelling, myalgias, neck pain and neck stiffness. Skin: Negative. Neurological: Negative for syncope. Psychiatric/Behavioral: Negative. Objective   Physical Exam  Vitals and nursing note reviewed. Constitutional:       Appearance: He is well-developed. HENT:      Head: Atraumatic. Eyes:      Conjunctiva/sclera: Conjunctivae normal.   Cardiovascular:      Rate and Rhythm: Normal rate and regular rhythm. Heart sounds: Normal heart sounds. Pulmonary:      Effort: Pulmonary effort is normal.      Breath sounds: Normal breath sounds. Abdominal:      Palpations: Abdomen is soft. Tenderness: There is no abdominal tenderness. Musculoskeletal:      Cervical back: Normal range of motion and neck supple. Lymphadenopathy:      Cervical: No cervical adenopathy. Skin:     Findings: No rash. Neurological:      Mental Status: He is alert. Psychiatric:         Behavior: Behavior normal.         Thought Content: Thought content normal.                  An electronic signature was used to authenticate this note.     --Cailin Johns MD

## 2021-09-27 DIAGNOSIS — K21.9 GASTROESOPHAGEAL REFLUX DISEASE WITHOUT ESOPHAGITIS: ICD-10-CM

## 2021-09-27 RX ORDER — PANTOPRAZOLE SODIUM 40 MG/1
TABLET, DELAYED RELEASE ORAL
Qty: 60 TABLET | Refills: 3 | Status: SHIPPED | OUTPATIENT
Start: 2021-09-27 | End: 2022-01-03

## 2021-09-27 NOTE — TELEPHONE ENCOUNTER
Health Maintenance   Topic Date Due    Hepatitis C screen  Never done    Hepatitis B vaccine (1 of 3 - Risk 3-dose series) Never done    Colon cancer screen colonoscopy  Never done    Shingles Vaccine (1 of 2) Never done    A1C test (Diabetic or Prediabetic)  07/09/2021    Diabetic foot exam  10/09/2021    Diabetic microalbuminuria test  10/09/2021    Annual Wellness Visit (AWV)  10/28/2021    Lipid screen  04/09/2022    Potassium monitoring  04/09/2022    Creatinine monitoring  04/09/2022    Diabetic retinal exam  04/27/2022    DTaP/Tdap/Td vaccine (2 - Td or Tdap) 11/19/2022    Pneumococcal 0-64 years Vaccine (2 of 2 - PPSV23) 04/15/2036    Flu vaccine  Completed    COVID-19 Vaccine  Completed    HIV screen  Completed    Hepatitis A vaccine  Aged Out    Hib vaccine  Aged Out    Meningococcal (ACWY) vaccine  Aged Out             (applicable per patient's age: Cancer Screenings, Depression Screening, Fall Risk Screening, Immunizations)    Hemoglobin A1C (%)   Date Value   04/09/2021 9.7   12/22/2020 10.1   10/09/2020 10.6     Microalb/Crt.  Ratio (mcg/mg creat)   Date Value   10/09/2020 24 (H)     LDL Cholesterol (mg/dL)   Date Value   10/09/2020 85     LDL Calculated (mg/dL)   Date Value   04/09/2021 91     AST (U/L)   Date Value   10/09/2020 47 (H)     ALT (U/L)   Date Value   10/09/2020 74 (H)     BUN (mg/dL)   Date Value   10/09/2020 17      (goal A1C is < 7)   (goal LDL is <100) need 30-50% reduction from baseline     BP Readings from Last 3 Encounters:   09/21/21 134/88   08/16/21 122/80   07/30/21 124/88    (goal /80)      All Future Testing planned in [facility]:  Lab Frequency Next Occurrence   Comprehensive Metabolic Panel Once 38/44/5879   Hemoglobin A1C Once 10/30/2021   Lipid Panel Once 10/30/2021   Microalbumin / Creatinine Urine Ratio Once 10/30/2021   MRI LUMBAR SPINE [address] Once 06/25/2021   Comprehensive Metabolic Panel Once 47/70/9303   Hemoglobin A1C Once 02/16/2022 Lipid Panel Once 02/16/2022   PSA screening Once 02/16/2022       Next Visit Date:  Future Appointments   Date Time Provider Dwaine Kae   10/28/2021  2:45 PM MD Mitchell Cedeno [facility]   10/29/2021 12:30 PM Josefina Webb MD [facility]   2/21/2022 10:30 AM MD Neo Cedeno [facility]            Patient Active Problem List:     GERD (gastroesophageal reflux disease)     Carpal tunnel syndrome of right wrist     Benign essential HTN     Diabetic peripheral neuropathy (HCC)     Mixed hyperlipidemia     Vitamin D deficiency     Leg pain, left     Microalbuminuria     PVD (peripheral vascular disease) ([facility] [address]     Status post partial resection of colon     Cigarette smoker     Left carotid artery stenosis     Pain in upper limb     Situational mixed anxiety and depressive disorder     History of coronary artery bypass graft     H/O heart artery stent     History of DVT (deep vein thrombosis)     Chronic midline low back pain without sciatica

## 2021-09-28 DIAGNOSIS — F41.1 GENERALIZED ANXIETY DISORDER: ICD-10-CM

## 2021-09-29 RX ORDER — HYDROXYZINE HYDROCHLORIDE 25 MG/1
TABLET, FILM COATED ORAL
Qty: 90 TABLET | Refills: 1 | Status: SHIPPED | OUTPATIENT
Start: 2021-09-29 | End: 2021-12-16

## 2021-09-29 NOTE — TELEPHONE ENCOUNTER
Health Maintenance   Topic Date Due    Hepatitis C screen  Never done    Hepatitis B vaccine (1 of 3 - Risk 3-dose series) Never done    Colon cancer screen colonoscopy  Never done    Shingles Vaccine (1 of 2) Never done    A1C test (Diabetic or Prediabetic)  07/09/2021    Diabetic foot exam  10/09/2021    Diabetic microalbuminuria test  10/09/2021    Annual Wellness Visit (AWV)  10/28/2021    Lipid screen  04/09/2022    Potassium monitoring  04/09/2022    Creatinine monitoring  04/09/2022    Diabetic retinal exam  04/27/2022    DTaP/Tdap/Td vaccine (2 - Td or Tdap) 11/19/2022    Pneumococcal 0-64 years Vaccine (2 of 2 - PPSV23) 04/15/2036    Flu vaccine  Completed    COVID-19 Vaccine  Completed    HIV screen  Completed    Hepatitis A vaccine  Aged Out    Hib vaccine  Aged Out    Meningococcal (ACWY) vaccine  Aged Out             (applicable per patient's age: Cancer Screenings, Depression Screening, Fall Risk Screening, Immunizations)    Hemoglobin A1C (%)   Date Value   04/09/2021 9.7   12/22/2020 10.1   10/09/2020 10.6     Microalb/Crt.  Ratio (mcg/mg creat)   Date Value   10/09/2020 24 (H)     LDL Cholesterol (mg/dL)   Date Value   10/09/2020 85     LDL Calculated (mg/dL)   Date Value   04/09/2021 91     AST (U/L)   Date Value   10/09/2020 47 (H)     ALT (U/L)   Date Value   10/09/2020 74 (H)     BUN (mg/dL)   Date Value   10/09/2020 17      (goal A1C is < 7)   (goal LDL is <100) need 30-50% reduction from baseline     BP Readings from Last 3 Encounters:   09/21/21 134/88   08/16/21 122/80   07/30/21 124/88    (goal /80)      All Future Testing planned in [facility]:  Lab Frequency Next Occurrence   Comprehensive Metabolic Panel Once 34/93/3497   Hemoglobin A1C Once 10/30/2021   Lipid Panel Once 10/30/2021   Microalbumin / Creatinine Urine Ratio Once 10/30/2021   MRI LUMBAR SPINE [address] Once 06/25/2021   Comprehensive Metabolic Panel Once 69/36/2874   Hemoglobin A1C Once 02/16/2022 Lipid Panel Once 02/16/2022   PSA screening Once 02/16/2022       Next Visit Date:  Future Appointments   Date Time Provider Dwaine Kae   10/28/2021  2:45 PM MD Mitchell [facility] [address] [employer] [facility]   10/29/2021 12:30 PM Johnie Thomas MD Darpardeep Flavors [facility] [address] [employer] [facility]   2/21/2022 10:30 AM MD Valerio Smith [facility]            Patient Active Problem List:     GERD (gastroesophageal reflux disease)     Carpal tunnel syndrome of right wrist     Benign essential HTN     Diabetic peripheral neuropathy (HCC)     Mixed hyperlipidemia     Vitamin D deficiency     Leg pain, left     Microalbuminuria     PVD (peripheral vascular disease) ([facility] [address]     Status post partial resection of colon     Cigarette smoker     Left carotid artery stenosis     Pain in upper limb     Situational mixed anxiety and depressive disorder     History of coronary artery bypass graft     H/O heart artery stent     History of DVT (deep vein thrombosis)     Chronic midline low back pain without sciatica

## 2021-10-18 ENCOUNTER — TELEPHONE (OUTPATIENT)
Dept: FAMILY MEDICINE CLINIC | Age: 51
End: 2021-10-18

## 2021-10-18 DIAGNOSIS — J01.90 ACUTE BACTERIAL SINUSITIS: Primary | ICD-10-CM

## 2021-10-18 DIAGNOSIS — B96.89 ACUTE BACTERIAL SINUSITIS: Primary | ICD-10-CM

## 2021-10-18 RX ORDER — AMOXICILLIN 500 MG/1
1 TABLET, FILM COATED ORAL 3 TIMES DAILY
Qty: 30 TABLET | Refills: 0 | Status: SHIPPED | OUTPATIENT
Start: 2021-10-18 | End: 2021-11-18 | Stop reason: ALTCHOICE

## 2021-10-18 NOTE — TELEPHONE ENCOUNTER
Pt is asking if you could call him in a Zpak or amoxicillin for his congestion, sore throat. No other symptoms, he was tested for COVID yesterday it was negative. DM- [place]      Health Maintenance   Topic Date Due    Hepatitis C screen  Never done    Hepatitis B vaccine (1 of 3 - Risk 3-dose series) Never done    Colon cancer screen colonoscopy  Never done    Shingles Vaccine (1 of 2) Never done    A1C test (Diabetic or Prediabetic)  07/09/2021    Diabetic foot exam  10/09/2021    Diabetic microalbuminuria test  10/09/2021    Annual Wellness Visit (AWV)  10/28/2021    Lipid screen  04/09/2022    Potassium monitoring  04/09/2022    Creatinine monitoring  04/09/2022    Diabetic retinal exam  04/27/2022    DTaP/Tdap/Td vaccine (2 - Td or Tdap) 11/19/2022    Pneumococcal 0-64 years Vaccine (2 of 2 - PPSV23) 04/15/2036    Flu vaccine  Completed    COVID-19 Vaccine  Completed    HIV screen  Completed    Hepatitis A vaccine  Aged Out    Hib vaccine  Aged Out    Meningococcal (ACWY) vaccine  Aged Out             (applicable per patient's age: Cancer Screenings, Depression Screening, Fall Risk Screening, Immunizations)    Hemoglobin A1C (%)   Date Value   04/09/2021 9.7   12/22/2020 10.1   10/09/2020 10.6     Microalb/Crt.  Ratio (mcg/mg creat)   Date Value   10/09/2020 24 (H)     LDL Cholesterol (mg/dL)   Date Value   10/09/2020 85     LDL Calculated (mg/dL)   Date Value   04/09/2021 91     AST (U/L)   Date Value   10/09/2020 47 (H)     ALT (U/L)   Date Value   10/09/2020 74 (H)     BUN (mg/dL)   Date Value   10/09/2020 17      (goal A1C is < 7)   (goal LDL is <100) need 30-50% reduction from baseline     BP Readings from Last 3 Encounters:   09/21/21 134/88   08/16/21 122/80   07/30/21 124/88    (goal /80)      All Future Testing planned in [facility]:  Lab Frequency Next Occurrence   Comprehensive Metabolic Panel Once 22/94/3897   Hemoglobin A1C Once 10/30/2021   Lipid Panel Once 10/30/2021 Microalbumin / Creatinine Urine Ratio Once 10/30/2021   MRI LUMBAR SPINE WO CONTRAST Once 06/25/2021   Comprehensive Metabolic Panel Once 16/57/0921   Hemoglobin A1C Once 02/16/2022   Lipid Panel Once 02/16/2022   PSA screening Once 02/16/2022       Next Visit Date:  Future Appointments   Date Time Provider Dwaine Kae   10/28/2021  2:45 PM Saul Hernandes MD [facility] [address]   10/29/2021 12:30 PM Velasquez Choudhary MD Ilir Ryan [facility] [address] [employer] [facility]   2/21/2022 10:30 AM MD Stewart Oakes [facility]            Patient Active Problem List:     GERD (gastroesophageal reflux disease)     Carpal tunnel syndrome of right wrist     Benign essential HTN     Diabetic peripheral neuropathy (HCC)     Mixed hyperlipidemia     Vitamin D deficiency     Leg pain, left     Microalbuminuria     PVD (peripheral vascular disease) ([address]     Status post partial resection of colon     Cigarette smoker     Left carotid artery stenosis     Pain in upper limb     Situational mixed anxiety and depressive disorder     History of coronary artery bypass graft     H/O heart artery stent     History of DVT (deep vein thrombosis)     Chronic midline low back pain without sciatica

## 2021-10-21 DIAGNOSIS — E11.42 TYPE 2 DIABETES MELLITUS WITH DIABETIC POLYNEUROPATHY, WITH LONG-TERM CURRENT USE OF INSULIN (HCC): ICD-10-CM

## 2021-10-21 DIAGNOSIS — Z79.4 TYPE 2 DIABETES MELLITUS WITH DIABETIC POLYNEUROPATHY, WITH LONG-TERM CURRENT USE OF INSULIN (HCC): ICD-10-CM

## 2021-10-21 DIAGNOSIS — I10 BENIGN ESSENTIAL HTN: ICD-10-CM

## 2021-10-21 RX ORDER — METOPROLOL TARTRATE 50 MG/1
TABLET, FILM COATED ORAL
Qty: 60 TABLET | Refills: 5 | Status: SHIPPED | OUTPATIENT
Start: 2021-10-21 | End: 2022-05-27

## 2021-10-21 NOTE — TELEPHONE ENCOUNTER
Health Maintenance   Topic Date Due    Hepatitis C screen  Never done    Hepatitis B vaccine (1 of 3 - Risk 3-dose series) Never done    Colon cancer screen colonoscopy  Never done    Shingles Vaccine (1 of 2) Never done    A1C test (Diabetic or Prediabetic)  07/09/2021    Diabetic foot exam  10/09/2021    Diabetic microalbuminuria test  10/09/2021    Annual Wellness Visit (AWV)  10/28/2021    Lipid screen  04/09/2022    Potassium monitoring  04/09/2022    Creatinine monitoring  04/09/2022    Diabetic retinal exam  04/27/2022    DTaP/Tdap/Td vaccine (2 - Td or Tdap) 11/19/2022    Pneumococcal 0-64 years Vaccine (2 of 2 - PPSV23) 04/15/2036    Flu vaccine  Completed    COVID-19 Vaccine  Completed    HIV screen  Completed    Hepatitis A vaccine  Aged Out    Hib vaccine  Aged Out    Meningococcal (ACWY) vaccine  Aged Out             (applicable per patient's age: Cancer Screenings, Depression Screening, Fall Risk Screening, Immunizations)    Hemoglobin A1C (%)   Date Value   04/09/2021 9.7   12/22/2020 10.1   10/09/2020 10.6     Microalb/Crt.  Ratio (mcg/mg creat)   Date Value   10/09/2020 24 (H)     LDL Cholesterol (mg/dL)   Date Value   10/09/2020 85     LDL Calculated (mg/dL)   Date Value   04/09/2021 91     AST (U/L)   Date Value   10/09/2020 47 (H)     ALT (U/L)   Date Value   10/09/2020 74 (H)     BUN (mg/dL)   Date Value   10/09/2020 17      (goal A1C is < 7)   (goal LDL is <100) need 30-50% reduction from baseline     BP Readings from Last 3 Encounters:   09/21/21 134/88   08/16/21 122/80   07/30/21 124/88    (goal /80)      All Future Testing planned in [facility]:  Lab Frequency Next Occurrence   Comprehensive Metabolic Panel Once 98/56/5554   Hemoglobin A1C Once 10/30/2021   Lipid Panel Once 10/30/2021   Microalbumin / Creatinine Urine Ratio Once 10/30/2021   MRI LUMBAR SPINE [address] Once 06/25/2021   Comprehensive Metabolic Panel Once 66/31/1952   Hemoglobin A1C Once 02/16/2022 Lipid Panel Once 02/16/2022   PSA screening Once 02/16/2022       Next Visit Date:  Future Appointments   Date Time Provider Dwaine Pal   10/28/2021  2:45 PM MD Mitchell [facility]   10/29/2021 12:30 PM Michael Dorado MD Maryam Shipley [facility]   2/21/2022 10:30 AM MD Ben Pulido [facility]            Patient Active Problem List:     GERD (gastroesophageal reflux disease)     Carpal tunnel syndrome of right wrist     Benign essential HTN     Diabetic peripheral neuropathy (HCC)     Mixed hyperlipidemia     Vitamin D deficiency     Leg pain, left     Microalbuminuria     PVD (peripheral vascular disease) ([facility] [address]     Status post partial resection of colon     Cigarette smoker     Left carotid artery stenosis     Pain in upper limb     Situational mixed anxiety and depressive disorder     History of coronary artery bypass graft     H/O heart artery stent     History of DVT (deep vein thrombosis)     Chronic midline low back pain without sciatica

## 2021-10-25 LAB
ALBUMIN SERPL-MCNC: NORMAL G/DL
ALP BLD-CCNC: NORMAL U/L
ALT SERPL-CCNC: NORMAL U/L
ANION GAP SERPL CALCULATED.3IONS-SCNC: NORMAL MMOL/L
AST SERPL-CCNC: NORMAL U/L
BILIRUB SERPL-MCNC: NORMAL MG/DL
BUN BLDV-MCNC: NORMAL MG/DL
CALCIUM SERPL-MCNC: NORMAL MG/DL
CHLORIDE BLD-SCNC: NORMAL MMOL/L
CHOLESTEROL, TOTAL: 118 MG/DL
CHOLESTEROL/HDL RATIO: ABNORMAL
CO2: NORMAL
CREAT SERPL-MCNC: 0.98 MG/DL
GFR CALCULATED: NORMAL
GLUCOSE BLD-MCNC: NORMAL MG/DL
HDLC SERPL-MCNC: 32 MG/DL (ref 35–70)
LDL CHOLESTEROL CALCULATED: 53 MG/DL (ref 0–160)
NONHDLC SERPL-MCNC: ABNORMAL MG/DL
POTASSIUM SERPL-SCNC: 4.9 MMOL/L
PROSTATE SPECIFIC ANTIGEN: 0.3 NG/ML
SODIUM BLD-SCNC: NORMAL MMOL/L
TOTAL PROTEIN: NORMAL
TRIGL SERPL-MCNC: 202 MG/DL
VLDLC SERPL CALC-MCNC: 33 MG/DL

## 2021-10-26 DIAGNOSIS — E78.2 MIXED HYPERLIPIDEMIA: ICD-10-CM

## 2021-10-26 DIAGNOSIS — Z12.5 PROSTATE CANCER SCREENING: ICD-10-CM

## 2021-10-26 DIAGNOSIS — I10 BENIGN ESSENTIAL HTN: ICD-10-CM

## 2021-10-26 DIAGNOSIS — E11.65 UNCONTROLLED TYPE 2 DIABETES MELLITUS WITH HYPERGLYCEMIA (HCC): ICD-10-CM

## 2021-10-26 LAB
AVERAGE GLUCOSE: NORMAL
HBA1C MFR BLD: 11.5 %

## 2021-10-28 ENCOUNTER — TELEPHONE (OUTPATIENT)
Dept: SURGERY | Age: 51
End: 2021-10-28

## 2021-10-28 NOTE — TELEPHONE ENCOUNTER
Patient phoned office stating he has established care with a new pain doctor and no longer needs appointments with Dr. John Posada. All future appointments cancelled.

## 2021-11-01 ENCOUNTER — TELEPHONE (OUTPATIENT)
Dept: FAMILY MEDICINE CLINIC | Age: 51
End: 2021-11-01

## 2021-11-01 NOTE — TELEPHONE ENCOUNTER
Verify that patient is taking 75 units of Levemir daily. If so, increase to 85 units and continue to monitor BS Qam and call on Monday with readings.

## 2021-11-02 NOTE — TELEPHONE ENCOUNTER
Pt reports he is taking 50 units in the BID of long acting and 5 units before a large meal with fast acting insulin.

## 2021-11-03 DIAGNOSIS — E11.65 UNCONTROLLED TYPE 2 DIABETES MELLITUS WITH HYPERGLYCEMIA (HCC): ICD-10-CM

## 2021-11-03 RX ORDER — INSULIN DETEMIR 100 [IU]/ML
55 INJECTION, SOLUTION SUBCUTANEOUS 2 TIMES DAILY
Qty: 11 PEN | Refills: 3
Start: 2021-11-03 | End: 2022-01-24 | Stop reason: SDUPTHER

## 2021-11-18 ENCOUNTER — OFFICE VISIT (OUTPATIENT)
Dept: FAMILY MEDICINE CLINIC | Age: 51
End: 2021-11-18
Payer: MEDICARE

## 2021-11-18 VITALS
HEART RATE: 96 BPM | HEIGHT: 69 IN | WEIGHT: 184 LBS | DIASTOLIC BLOOD PRESSURE: 86 MMHG | BODY MASS INDEX: 27.25 KG/M2 | SYSTOLIC BLOOD PRESSURE: 120 MMHG

## 2021-11-18 DIAGNOSIS — Z00.00 ROUTINE GENERAL MEDICAL EXAMINATION AT A HEALTH CARE FACILITY: Primary | ICD-10-CM

## 2021-11-18 PROCEDURE — G0439 PPPS, SUBSEQ VISIT: HCPCS | Performed by: INTERNAL MEDICINE

## 2021-11-18 ASSESSMENT — LIFESTYLE VARIABLES: HOW OFTEN DO YOU HAVE A DRINK CONTAINING ALCOHOL: 0

## 2021-11-18 ASSESSMENT — PATIENT HEALTH QUESTIONNAIRE - PHQ9
SUM OF ALL RESPONSES TO PHQ9 QUESTIONS 1 & 2: 0
1. LITTLE INTEREST OR PLEASURE IN DOING THINGS: 0
SUM OF ALL RESPONSES TO PHQ QUESTIONS 1-9: 0
SUM OF ALL RESPONSES TO PHQ QUESTIONS 1-9: 0
2. FEELING DOWN, DEPRESSED OR HOPELESS: 0
SUM OF ALL RESPONSES TO PHQ QUESTIONS 1-9: 0

## 2021-11-18 NOTE — PROGRESS NOTES
Medicare Annual Wellness Visit  Name: Clayton Hoffman Date: 2021   MRN: [id number] Sex: Male   Age: 49 y.o. Ethnicity: Non- / Non    : 1970 Race: White (non-)      Malcom Olmedo is here for Medicare AWV    Screenings for behavioral, psychosocial and functional/safety risks, and cognitive dysfunction are all negative except as indicated below. These results, as well as other patient data from the [address] form, are documented in Flowsheets linked to this Encounter. Allergies   Allergen Reactions    Oxycodone-Acetaminophen      Other reaction(s): Mental Status Change       Prior to Visit Medications    Medication Sig Taking? Authorizing Provider   amLODIPine (NORVASC) 5 MG tablet TAKE 1 TABLET BY MOUTH EVERY DAY Yes John Mathias MD   insulin detemir (LEVEMIR FLEXTOUCH) 100 UNIT/ML injection pen Inject 55 Units into the skin 2 times daily  John Mathias MD   metFORMIN (GLUCOPHAGE) 1000 MG tablet TAKE 1 TABLET BY MOUTH TWICE DAILY WITH MEALS  John Mathias MD   metoprolol tartrate (LOPRESSOR) 50 MG tablet TAKE 1 TABLET BY MOUTH TWICE DAILY  John Mathias MD   hydrOXYzine (ATARAX) 25 MG tablet TAKE 1 TABLET BY MOUTH THREE TIMES DAILY AS NEEDED FOR ANXIETY  John Mathias MD   pantoprazole (PROTONIX) 40 MG tablet Take 2 tablets daily. John Mathias MD   gabapentin (NEURONTIN) 300 MG capsule Take 1 capsule by mouth 3 times daily for 91 days.   John Mathias MD   cyclobenzaprine (FLEXERIL) 10 MG tablet Take 1 tablet by mouth nightly as needed for Muscle spasms  John Mathias MD   losartan (COZAAR) 100 MG tablet Take 1 tablet by mouth daily  John Mathias MD   cilostazol (PLETAL) 100 MG tablet TAKE 1 TABLET BY MOUTH TWICE DAILY  John Mathias MD   rosuvastatin (CRESTOR) 10 MG tablet Take 1 tablet by mouth nightly  John Mathias MD   insulin lispro, 1 Unit Dial, (HUMALOG KWIKPEN) 100 UNIT/ML SOPN Inject 3 Units into the skin 3 times daily (before meals)  Colonel Marion Mathias MD   venlafaxine (EFFEXOR XR) 75 MG extended release capsule TAKE 1 CAPSULE BY MOUTH EVERY DAY  John Mathias MD   Cholecalciferol (VITAMIN D) 50 MCG (2000 UT) CAPS capsule Take by mouth  Historical Provider, MD   prazosin (MINIPRESS) 1 MG capsule TAKE 1 CAPSULE BY MOUTH EVERY EVENING  John Mathias MD   Drug [place] Unilet Lancets 28G MISC Test TID and prn. DX E11.9, Z79.4  John Mathias MD   blood glucose monitor strips 1 strip by Other route 3 times daily as needed for Other (as needed) Dx E11.9, Z79.4 Drug [place] Unilet Test strips  John Mathias MD   Insulin Syringe-Needle U-100 30G X 1/2\" 0.5 ML MISC Dx E11.9, Z79.4 injecting insulin QD  John Mathias MD   magnesium oxide (MAG-OX) 400 (241.3 Mg) MG TABS tablet TAKE 1 TABLET BY MOUTH DAILY  Historical Provider, MD   metoclopramide (REGLAN) 10 MG tablet Take 10 mg by mouth  Historical Provider, MD   hyoscyamine (ANASPAZ;LEVSIN) 125 MCG tablet Take 125 mcg by mouth every 4 hours as needed for Cramping  Historical Provider, MD   loperamide (IMODIUM) 2 MG capsule Take 2 mg by mouth 4 times daily as needed for Diarrhea  Historical Provider, MD   busPIRone (BUSPAR) 10 MG tablet TAKE 1 TABLET BY MOUTH TWICE DAILY  Ruperto Bailey, APRN - NP   TRUE METRIX BLOOD GLUCOSE TEST strip Test BS TID and prn. John Mathias MD   aspirin 81 MG EC tablet Take 81 mg by mouth daily. Historical Provider, MD       Past Medical History:   Diagnosis Date    GERD (gastroesophageal reflux disease)     Hyperlipidemia     Hypertension     Microalbuminuria     PAD (peripheral artery disease) ([facility] [address]        Past Surgical History:   Procedure Laterality Date    APPENDECTOMY      ARTERIAL BYPASS SURGRY  8/2012    CARDIAC SURGERY  2006    COLECTOMY  08/2019    sigmoid resection ([facility])    HERNIA REPAIR  06/16/2020    Dr Shawna Thomas  08/2019    small bowel resection ([facility])       No family history on file.     CareTeam (Including outside providers/suppliers regularly involved in providing care):   Patient Care Team:  Danny Osborne MD as PCP - General (Internal Medicine)  Danny Osborne MD as PCP - [facility] Empaneled Provider  Kumar Felix MD (Vascular Surgery)  Anum Jones MD as Consulting Physician (Nephrology)    Wt Readings from Last 3 Encounters:   11/18/21 184 lb (83.5 kg)   09/21/21 182 lb (82.6 kg)   08/16/21 182 lb (82.6 kg)     Vitals:    11/18/21 0856   BP: 120/86   Pulse: 96   Weight: 184 lb (83.5 kg)   Height: 5' 9\" (1.753 m)     Body mass index is 27.17 kg/m². Based upon direct observation of the patient, evaluation of cognition reveals recent and remote memory intact. Patient's complete Health Risk Assessment and screening values have been reviewed and are found in Flowsheets. The following problems were reviewed today and where indicated follow up appointments were made and/or referrals ordered. Positive Risk Factor Screenings with Interventions:         Substance History:  Social History     Tobacco History     Smoking Status  Current Some Day Smoker Last attempt to quit  10/27/2011 Smoking Frequency  0.25 packs/day for 20 years (5 pk yrs) Smoking Tobacco Type  Cigarettes    Smokeless Tobacco Use  Never Used    Tobacco Comment  wife smokes, passive          Alcohol History     Alcohol Use Status  Not Asked          Drug Use     Drug Use Status  Not Asked          Sexual Activity     Sexually Active  Not Asked               Alcohol Screening:       A score of 8 or more is associated with harmful or hazardous drinking. A score of 13 or more in women, and 15 or more in men, is likely to indicate alcohol dependence. Substance Abuse Interventions:  · Brianne Borja denies drinking any alcoholic beverages. General Health and ACP:  General  In general, how would you say your health is?: Fair  In the past 7 days, have you experienced any of the following?  New or Increased Pain, New or Increased Fatigue, Loneliness, Social Isolation, Stress or Anger?: None of These  Do you get the social and emotional support that you need?: Yes  Do you have a Living Will?: (!) No  Advance Directives     Power of  Living Will ACP-Advance Directive ACP-Power of     Not on File Not on File Not on File Not on File      General Health Risk Interventions:  · Discussed what a living will is and how to obtain one. Health Habits/Nutrition:  Health Habits/Nutrition  Do you exercise for at least 20 minutes 2-3 times per week?: (!) No  Have you lost any weight without trying in the past 3 months?: No  Do you eat only one meal per day?: No  Have you seen the dentist within the past year?: Yes  Body mass index: (!) 27.17  Health Habits/Nutrition Interventions:  · Discussed walking 20 minutes 3 times a week. ADL:  ADLs  In the past 7 days, did you need help from others to perform any of the following everyday activities? Eating, dressing, grooming, bathing, toileting, or walking/balance?: None  In the past 7 days, did you need help from others to take care of any of the following? Laundry, housekeeping, banking/finances, shopping, telephone use, food preparation, transportation, or taking medications?: (!) Food Preparation (difficulty standing for long periods)  ADL Interventions:  · He states his wife has to take do the cooking because he cannot stand for a long period of time.     Personalized Preventive Plan   Current Health Maintenance Status  Immunization History   Administered Date(s) Administered    COVID-19, Enriqueta Slaughter, Primary or Immunocompromised, PF, 100mcg/0.5mL 03/05/2021, 04/02/2021, 11/06/2021    Influenza 11/10/2011, 11/01/2012, 10/15/2013    Influenza Virus Vaccine 10/17/2014, 10/09/2015    Influenza, MDCK Quadv, IM, PF (Flucelvax 2 yrs and older) 09/21/2021    Influenza, Amaris Saint, IM, PF (6 mo and older Fluzone, Flulaval, Fluarix, and 3 yrs and older Afluria) 10/20/2017, 10/08/2018, 10/15/2019, 10/09/2020    Influenza, Triv, 3 Years and older, IM (Lucetta Badger (5 yrs and older) 09/06/2016    Pneumococcal Polysaccharide ([id number]) 11/10/2011, 10/15/2013    Tdap (Boostrix, Adacel) 11/19/2012        Health Maintenance   Topic Date Due    Hepatitis C screen  Never done    Hepatitis B vaccine (1 of 3 - Risk 3-dose series) Never done    Colon cancer screen colonoscopy  Never done    Shingles Vaccine (1 of 2) Never done    Diabetic foot exam  10/09/2021    Diabetic microalbuminuria test  10/09/2021    Annual Wellness Visit (AWV)  10/28/2021    A1C test (Diabetic or Prediabetic)  01/25/2022    Diabetic retinal exam  04/27/2022    Lipid screen  10/25/2022    Potassium monitoring  10/25/2022    Creatinine monitoring  10/25/2022    DTaP/Tdap/Td vaccine (2 - Td or Tdap) 11/19/2022    Pneumococcal 0-64 years Vaccine (2 of 2 - PPSV23) 04/15/2036    Flu vaccine  Completed    COVID-19 Vaccine  Completed    HIV screen  Completed    Hepatitis A vaccine  Aged Out    Hib vaccine  Aged Out    Meningococcal (ACWY) vaccine  Aged Out     Recommendations for [employer] Due: see orders and patient instructions/AVS.  . Recommended screening schedule for the next 5-10 years is provided to the patient in written form: see Patient Katheren Romberg was seen today for medicare awv. Diagnoses and all orders for this visit:    Routine general medical examination at a health care facility  See above concerns and discussion. Advance Care Planning     Advance Care Planning (ACP) Physician/NP/PA Conversation    Date of Conversation: 11/18/2021  Conducted with: Patient with Decision Making Capacity    Healthcare Decision Maker:  No healthcare decision makers have been documented. Click here to complete [address] including selection of the Healthcare Decision Maker Relationship (ie \"Primary\")     Today we documented Decision Maker(s) consistent with Legal Next of Kin hierarchy. Care Preferences:    Hospitalization:   \"If your health worsens and it becomes clear that your chance of recovery is unlikely, what would be your preference regarding hospitalization? \"  The patient would prefer hospitalization. Ventilation: \"If you were unable to breath on your own and your chance of recovery was unlikely, what would be your preference about the use of a ventilator (breathing machine) if it was available to you? \"  The patient would desire the use of a ventilator. Resuscitation: \"In the event your heart stopped as a result of an underlying serious health condition, would you want attempts made to restart your heart, or would you prefer a natural death? \"  Yes, attempt to resuscitate.     benefit/burden of treatment options    Conversation Outcomes / Follow-Up Plan:  ACP complete - no further action today  Reviewed DNR/DNI and patient elects Full Code (Attempt Resuscitation)    Length of Voluntary ACP Conversation in minutes:  <16 minutes (Non-Billable)    Jamal Mathias MD

## 2021-11-18 NOTE — PATIENT INSTRUCTIONS
Survey: You may be receiving a survey from [employer] regarding your visit today. You may get this in the mail, through your MyChart or in your email. Please complete the survey to enable us to provide the highest quality of care to you and your family. Please also, mention our names. If you cannot score us as very good (5 Stars) on any question, please feel free to call the office to discuss how we could have made your experience exceptional.      Thank You! MD Adam Hogue, Serg Covington, ELIZAN RN Lindalee Merlin        Personalized Preventive Plan for Candace Carey - 11/18/2021  Medicare offers a range of preventive health benefits. Some of the tests and screenings are paid in full while other may be subject to a deductible, co-insurance, and/or copay. Some of these benefits include a comprehensive review of your medical history including lifestyle, illnesses that may run in your family, and various assessments and screenings as appropriate. After reviewing your medical record and screening and assessments performed today your provider may have ordered immunizations, labs, imaging, and/or referrals for you. A list of these orders (if applicable) as well as your Preventive Care list are included within your After Visit Summary for your review. Other Preventive Recommendations:    · A preventive eye exam performed by an eye specialist is recommended every 1-2 years to screen for glaucoma; cataracts, macular degeneration, and other eye disorders. · A preventive dental visit is recommended every 6 months. · Try to get at least 150 minutes of exercise per week or 10,000 steps per day on a pedometer . · Order or download the FREE \"Exercise & Physical Activity: Your Everyday Guide\" from [employer]. Call 2-384.722.2392 or search [employer] online. · You need 6126-9999 mg of calcium and 6811-6105 IU of vitamin D per day.  It is possible to meet your calcium requirement with diet alone, but a vitamin D supplement is usually necessary to meet this goal.  · When exposed to the sun, use a sunscreen that protects against both UVA and UVB radiation with an SPF of 30 or greater. Reapply every 2 to 3 hours or after sweating, drying off with a towel, or swimming. · Always wear a seat belt when traveling in a car. Always wear a helmet when riding a bicycle or motorcycle.

## 2021-12-07 ENCOUNTER — TELEPHONE (OUTPATIENT)
Dept: FAMILY MEDICINE CLINIC | Age: 51
End: 2021-12-07

## 2021-12-07 DIAGNOSIS — J01.90 ACUTE BACTERIAL SINUSITIS: Primary | ICD-10-CM

## 2021-12-07 DIAGNOSIS — B96.89 ACUTE BACTERIAL SINUSITIS: Primary | ICD-10-CM

## 2021-12-07 RX ORDER — AMOXICILLIN 500 MG/1
1 TABLET, FILM COATED ORAL 3 TIMES DAILY
Qty: 30 TABLET | Refills: 0 | Status: SHIPPED | OUTPATIENT
Start: 2021-12-07 | End: 2022-05-19

## 2021-12-07 NOTE — TELEPHONE ENCOUNTER
Head congestion, runny stuffy nose. Leaving Thursday morning to go to [place]. Is asking for Penicillin?  Send to [facility]

## 2021-12-16 DIAGNOSIS — F41.1 GENERALIZED ANXIETY DISORDER: ICD-10-CM

## 2021-12-16 RX ORDER — HYDROXYZINE HYDROCHLORIDE 25 MG/1
TABLET, FILM COATED ORAL
Qty: 90 TABLET | Refills: 1 | Status: SHIPPED | OUTPATIENT
Start: 2021-12-16 | End: 2022-01-26 | Stop reason: SDUPTHER

## 2022-01-03 DIAGNOSIS — K21.9 GASTROESOPHAGEAL REFLUX DISEASE WITHOUT ESOPHAGITIS: ICD-10-CM

## 2022-01-03 DIAGNOSIS — I10 BENIGN ESSENTIAL HTN: ICD-10-CM

## 2022-01-03 DIAGNOSIS — F43.23 SITUATIONAL MIXED ANXIETY AND DEPRESSIVE DISORDER: ICD-10-CM

## 2022-01-03 RX ORDER — PANTOPRAZOLE SODIUM 40 MG/1
TABLET, DELAYED RELEASE ORAL
Qty: 60 TABLET | Refills: 3 | Status: SHIPPED | OUTPATIENT
Start: 2022-01-03 | End: 2022-05-27

## 2022-01-03 RX ORDER — AMLODIPINE BESYLATE 5 MG/1
TABLET ORAL
Qty: 30 TABLET | Refills: 5 | Status: SHIPPED | OUTPATIENT
Start: 2022-01-03 | End: 2022-07-21

## 2022-01-03 RX ORDER — VENLAFAXINE HYDROCHLORIDE 75 MG/1
75 CAPSULE, EXTENDED RELEASE ORAL DAILY
Qty: 30 CAPSULE | Refills: 5 | Status: SHIPPED | OUTPATIENT
Start: 2022-01-03 | End: 2022-08-11

## 2022-01-03 NOTE — TELEPHONE ENCOUNTER
Health Maintenance   Topic Date Due    Hepatitis C screen  Never done    Hepatitis B vaccine (1 of 3 - Risk 3-dose series) Never done    Colon cancer screen colonoscopy  Never done    Shingles Vaccine (1 of 2) Never done    Diabetic foot exam  10/09/2021    Diabetic microalbuminuria test  10/09/2021    A1C test (Diabetic or Prediabetic)  01/25/2022    Diabetic retinal exam  04/27/2022    Lipid screen  10/25/2022    Potassium monitoring  10/25/2022    Creatinine monitoring  10/25/2022    Depression Monitoring  11/18/2022    DTaP/Tdap/Td vaccine (2 - Td or Tdap) 11/19/2022    Annual Wellness Visit (AWV)  11/19/2022    Pneumococcal 0-64 years Vaccine (2 of 2 - PPSV23) 04/15/2036    Flu vaccine  Completed    COVID-19 Vaccine  Completed    HIV screen  Completed    Hepatitis A vaccine  Aged Out    Hib vaccine  Aged Out    Meningococcal (ACWY) vaccine  Aged Out             (applicable per patient's age: Cancer Screenings, Depression Screening, Fall Risk Screening, Immunizations)    Hemoglobin A1C (%)   Date Value   10/25/2021 11.5   04/09/2021 9.7   12/22/2020 10.1     Microalb/Crt.  Ratio (mcg/mg creat)   Date Value   10/09/2020 24 (H)     LDL Cholesterol (mg/dL)   Date Value   10/09/2020 85     LDL Calculated (mg/dL)   Date Value   10/25/2021 53     AST (U/L)   Date Value   10/09/2020 47 (H)     ALT (U/L)   Date Value   10/09/2020 74 (H)     BUN (mg/dL)   Date Value   10/09/2020 17      (goal A1C is < 7)   (goal LDL is <100) need 30-50% reduction from baseline     BP Readings from Last 3 Encounters:   11/18/21 120/86   09/21/21 134/88   08/16/21 122/80    (goal /80)      All Future Testing planned in [facility]:  Lab Frequency Next Occurrence   Microalbumin / Creatinine Urine Ratio Once 10/30/2021   MRI LUMBAR SPINE WO CONTRAST Once 06/25/2021   Comprehensive Metabolic Panel Once 15/06/6820   Hemoglobin A1C Once 02/16/2022   Lipid Panel Once 02/16/2022       Next Visit Date:  Future Appointments Date Time Provider Dwaine Kae   [facility]2022 10[facility]   11/21/2022 10:30 AM MD Glen Jennings [facility]            Patient Active Problem List:     GERD (gastroesophageal reflux disease)     Carpal tunnel syndrome of right wrist     Benign essential HTN     Diabetic peripheral neuropathy (HCC)     Mixed hyperlipidemia     Vitamin D deficiency     Leg pain, left     Microalbuminuria     PVD (peripheral vascular disease) (HCC)     Status post partial resection of colon     Cigarette smoker     Left carotid artery stenosis     Pain in upper limb     Situational mixed anxiety and depressive disorder     History of coronary artery bypass graft     H/O heart artery stent     History of DVT (deep vein thrombosis)     Chronic midline low back pain without sciatica

## 2022-01-03 NOTE — TELEPHONE ENCOUNTER
Health Maintenance   Topic Date Due    Hepatitis C screen  Never done    Hepatitis B vaccine (1 of 3 - Risk 3-dose series) Never done    Colon cancer screen colonoscopy  Never done    Shingles Vaccine (1 of 2) Never done    Diabetic foot exam  10/09/2021    Diabetic microalbuminuria test  10/09/2021    A1C test (Diabetic or Prediabetic)  01/25/2022    Diabetic retinal exam  04/27/2022    Lipid screen  10/25/2022    Potassium monitoring  10/25/2022    Creatinine monitoring  10/25/2022    Depression Monitoring  11/18/2022    DTaP/Tdap/Td vaccine (2 - Td or Tdap) 11/19/2022    Annual Wellness Visit (AWV)  11/19/2022    Pneumococcal 0-64 years Vaccine (2 of 2 - PPSV23) 04/15/2036    Flu vaccine  Completed    COVID-19 Vaccine  Completed    HIV screen  Completed    Hepatitis A vaccine  Aged Out    Hib vaccine  Aged Out    Meningococcal (ACWY) vaccine  Aged Out             (applicable per patient's age: Cancer Screenings, Depression Screening, Fall Risk Screening, Immunizations)    Hemoglobin A1C (%)   Date Value   10/25/2021 11.5   04/09/2021 9.7   12/22/2020 10.1     Microalb/Crt.  Ratio (mcg/mg creat)   Date Value   10/09/2020 24 (H)     LDL Cholesterol (mg/dL)   Date Value   10/09/2020 85     LDL Calculated (mg/dL)   Date Value   10/25/2021 53     AST (U/L)   Date Value   10/09/2020 47 (H)     ALT (U/L)   Date Value   10/09/2020 74 (H)     BUN (mg/dL)   Date Value   10/09/2020 17      (goal A1C is < 7)   (goal LDL is <100) need 30-50% reduction from baseline     BP Readings from Last 3 Encounters:   11/18/21 120/86   09/21/21 134/88   08/16/21 122/80    (goal /80)      All Future Testing planned in [facility]:  Lab Frequency Next Occurrence   Microalbumin / Creatinine Urine Ratio Once 10/30/2021   MRI LUMBAR SPINE WO CONTRAST Once 06/25/2021   Comprehensive Metabolic Panel Once 69/51/3499   Hemoglobin A1C Once 02/16/2022   Lipid Panel Once 02/16/2022       Next Visit Date:  Future Appointments Date Time Provider Dwaine Pal   2/21/2022 10:30 AM MD Blaine Ivy  Benny Shuklao   11/21/2022 10:30 AM MD Titus Ivy [facility]            Patient Active Problem List:     GERD (gastroesophageal reflux disease)     Carpal tunnel syndrome of right wrist     Benign essential HTN     Diabetic peripheral neuropathy (HCC)     Mixed hyperlipidemia     Vitamin D deficiency     Leg pain, left     Microalbuminuria     PVD (peripheral vascular disease) (HCC)     Status post partial resection of colon     Cigarette smoker     Left carotid artery stenosis     Pain in upper limb     Situational mixed anxiety and depressive disorder     History of coronary artery bypass graft     H/O heart artery stent     History of DVT (deep vein thrombosis)     Chronic midline low back pain without sciatica

## 2022-01-14 DIAGNOSIS — E78.2 MIXED HYPERLIPIDEMIA: ICD-10-CM

## 2022-01-17 RX ORDER — ROSUVASTATIN CALCIUM 10 MG/1
10 TABLET, COATED ORAL NIGHTLY
Qty: 30 TABLET | Refills: 5 | Status: SHIPPED | OUTPATIENT
Start: 2022-01-17 | End: 2022-07-27

## 2022-01-17 NOTE — TELEPHONE ENCOUNTER
Health Maintenance   Topic Date Due    Hepatitis C screen  Never done    Hepatitis B vaccine (1 of 3 - Risk 3-dose series) Never done    Colon cancer screen colonoscopy  Never done    Shingles Vaccine (1 of 2) Never done    Diabetic foot exam  10/09/2021    Diabetic microalbuminuria test  10/09/2021    A1C test (Diabetic or Prediabetic)  01/25/2022    Diabetic retinal exam  04/27/2022    Lipid screen  10/25/2022    Potassium monitoring  10/25/2022    Creatinine monitoring  10/25/2022    Depression Monitoring  11/18/2022    DTaP/Tdap/Td vaccine (2 - Td or Tdap) 11/19/2022    Annual Wellness Visit (AWV)  11/19/2022    Pneumococcal 0-64 years Vaccine (2 of 2 - PPSV23) 04/15/2036    Flu vaccine  Completed    COVID-19 Vaccine  Completed    HIV screen  Completed    Hepatitis A vaccine  Aged Out    Hib vaccine  Aged Out    Meningococcal (ACWY) vaccine  Aged Out             (applicable per patient's age: Cancer Screenings, Depression Screening, Fall Risk Screening, Immunizations)    Hemoglobin A1C (%)   Date Value   10/25/2021 11.5   04/09/2021 9.7   12/22/2020 10.1     Microalb/Crt.  Ratio (mcg/mg creat)   Date Value   10/09/2020 24 (H)     LDL Cholesterol (mg/dL)   Date Value   10/09/2020 85     LDL Calculated (mg/dL)   Date Value   10/25/2021 53     AST (U/L)   Date Value   10/09/2020 47 (H)     ALT (U/L)   Date Value   10/09/2020 74 (H)     BUN (mg/dL)   Date Value   10/09/2020 17      (goal A1C is < 7)   (goal LDL is <100) need 30-50% reduction from baseline     BP Readings from Last 3 Encounters:   11/18/21 120/86   09/21/21 134/88   08/16/21 122/80    (goal /80)      All Future Testing planned in [facility]:  Lab Frequency Next Occurrence   Microalbumin / Creatinine Urine Ratio Once 02/21/2022   MRI LUMBAR SPINE WO CONTRAST Once 06/25/2021   Comprehensive Metabolic Panel Once 94/61/0098   Hemoglobin A1C Once 02/16/2022   Lipid Panel Once 02/16/2022       Next Visit Date:  Future Appointments Date Time Provider Dwaine Kae   2/21/2022 10:30 AM Jack Baptiste MD [facility] [address] [facility]   11/21/2022 10:30 AM Jack Baptiste MD [facility]            Patient Active Problem List:     GERD (gastroesophageal reflux disease)     Carpal tunnel syndrome of right wrist     Benign essential HTN     Diabetic peripheral neuropathy (HCC)     Mixed hyperlipidemia     Vitamin D deficiency     Leg pain, left     Microalbuminuria     PVD (peripheral vascular disease) (HCC)     Status post partial resection of colon     Cigarette smoker     Left carotid artery stenosis     Pain in upper limb     Situational mixed anxiety and depressive disorder     History of coronary artery bypass graft     H/O heart artery stent     History of DVT (deep vein thrombosis)     Chronic midline low back pain without sciatica

## 2022-01-24 DIAGNOSIS — E11.65 UNCONTROLLED TYPE 2 DIABETES MELLITUS WITH HYPERGLYCEMIA (HCC): ICD-10-CM

## 2022-01-24 RX ORDER — INSULIN DETEMIR 100 [IU]/ML
50 INJECTION, SOLUTION SUBCUTANEOUS 2 TIMES DAILY
Qty: 10 PEN | Refills: 3 | Status: SHIPPED | OUTPATIENT
Start: 2022-01-24 | End: 2022-04-29 | Stop reason: DRUGHIGH

## 2022-01-24 NOTE — TELEPHONE ENCOUNTER
----- Message from Jose Roberto Kirk sent at 1/24/2022  1:00 PM EST -----  Subject: Message to Provider    QUESTIONS  Information for Provider? Pt said he needs to speak to nurse about his   insulin. Please contact him and assist this when you get a change.  ---------------------------------------------------------------------------  --------------  CALL BACK INFO  What is the best way for the office to contact you? OK to leave message on   voicemail  Preferred Call Back Phone Number? [phone]  ---------------------------------------------------------------------------  --------------  SCRIPT ANSWERS  Relationship to Patient?  Self

## 2022-01-25 DIAGNOSIS — F41.1 GENERALIZED ANXIETY DISORDER: ICD-10-CM

## 2022-01-26 RX ORDER — CILOSTAZOL 100 MG/1
TABLET ORAL
Qty: 60 TABLET | Refills: 1 | Status: SHIPPED | OUTPATIENT
Start: 2022-01-26 | End: 2022-04-27 | Stop reason: SDUPTHER

## 2022-01-26 RX ORDER — HYDROXYZINE HYDROCHLORIDE 25 MG/1
TABLET, FILM COATED ORAL
Qty: 90 TABLET | Refills: 1 | Status: SHIPPED | OUTPATIENT
Start: 2022-01-26 | End: 2022-04-27 | Stop reason: SDUPTHER

## 2022-02-25 DIAGNOSIS — E11.42 DIABETIC PERIPHERAL NEUROPATHY (HCC): ICD-10-CM

## 2022-02-25 DIAGNOSIS — G89.29 CHRONIC MIDLINE LOW BACK PAIN WITHOUT SCIATICA: ICD-10-CM

## 2022-02-25 DIAGNOSIS — M54.50 CHRONIC MIDLINE LOW BACK PAIN WITHOUT SCIATICA: ICD-10-CM

## 2022-02-25 RX ORDER — GABAPENTIN 300 MG/1
300 CAPSULE ORAL 3 TIMES DAILY
Qty: 90 CAPSULE | Refills: 2 | Status: SHIPPED | OUTPATIENT
Start: 2022-02-25 | End: 2022-05-27

## 2022-02-25 NOTE — TELEPHONE ENCOUNTER
Health Maintenance   Topic Date Due    Hepatitis C screen  Never done    Hepatitis B vaccine (1 of 3 - Risk 3-dose series) Never done    Colorectal Cancer Screen  Never done    Shingles Vaccine (1 of 2) Never done    Diabetic foot exam  10/09/2021    Diabetic microalbuminuria test  10/09/2021    A1C test (Diabetic or Prediabetic)  01/25/2022    Diabetic retinal exam  04/27/2022    Lipid screen  10/25/2022    Potassium monitoring  10/25/2022    Creatinine monitoring  10/25/2022    Depression Monitoring  11/18/2022    DTaP/Tdap/Td vaccine (2 - Td or Tdap) 11/19/2022    Annual Wellness Visit (AWV)  11/19/2022    Pneumococcal 0-64 years Vaccine (2 of 2 - PPSV23) 04/15/2036    Flu vaccine  Completed    COVID-19 Vaccine  Completed    HIV screen  Completed    Hepatitis A vaccine  Aged Out    Hib vaccine  Aged Out    Meningococcal (ACWY) vaccine  Aged Out             (applicable per patient's age: Cancer Screenings, Depression Screening, Fall Risk Screening, Immunizations)    Hemoglobin A1C (%)   Date Value   10/25/2021 11.5   04/09/2021 9.7   12/22/2020 10.1     Microalb/Crt.  Ratio (mcg/mg creat)   Date Value   10/09/2020 24 (H)     LDL Cholesterol (mg/dL)   Date Value   10/09/2020 85     LDL Calculated (mg/dL)   Date Value   10/25/2021 53     AST (U/L)   Date Value   10/09/2020 47 (H)     ALT (U/L)   Date Value   10/09/2020 74 (H)     BUN (mg/dL)   Date Value   10/09/2020 17      (goal A1C is < 7)   (goal LDL is <100) need 30-50% reduction from baseline     BP Readings from Last 3 Encounters:   11/18/21 120/86   09/21/21 134/88   08/16/21 122/80    (goal /80)      All Future Testing planned in [facility]:  Lab Frequency Next Occurrence   Microalbumin / Creatinine Urine Ratio Once 02/21/2022   MRI LUMBAR SPINE WO CONTRAST Once 06/25/2021   Comprehensive Metabolic Panel Once 53/54/5243   Hemoglobin A1C Once 03/15/2022   Lipid Panel Once 03/15/2022       Next Visit Date:  Future Appointments   Date Time Provider Department Center   4/15/2022  9:45 AM Pily CheckMD [place]   11/21/2022 10:30 AM MD Leonie King [facility]            Patient Active Problem List:     GERD (gastroesophageal reflux disease)     Carpal tunnel syndrome of right wrist     Benign essential HTN     Diabetic peripheral neuropathy (HCC)     Mixed hyperlipidemia     Vitamin D deficiency     Leg pain, left     Microalbuminuria     PVD (peripheral vascular disease) (HCC)     Status post partial resection of colon     Cigarette smoker     Left carotid artery stenosis     Pain in upper limb     Situational mixed anxiety and depressive disorder     History of coronary artery bypass graft     H/O heart artery stent     History of DVT (deep vein thrombosis)     Chronic midline low back pain without sciatica

## 2022-04-21 ENCOUNTER — TELEPHONE (OUTPATIENT)
Dept: FAMILY MEDICINE CLINIC | Age: 52
End: 2022-04-21

## 2022-04-21 DIAGNOSIS — J01.90 ACUTE BACTERIAL SINUSITIS: Primary | ICD-10-CM

## 2022-04-21 DIAGNOSIS — B96.89 ACUTE BACTERIAL SINUSITIS: Primary | ICD-10-CM

## 2022-04-21 RX ORDER — AZITHROMYCIN 250 MG/1
TABLET, FILM COATED ORAL
Qty: 1 PACKET | Refills: 0 | Status: SHIPPED | OUTPATIENT
Start: 2022-04-21 | End: 2022-05-01

## 2022-04-21 NOTE — TELEPHONE ENCOUNTER
Pt called into the office requesting a Z-diana. He states he started getting sick a few days ago with cough, sinus congestion, ear ache, lots of thick nasal drainage. Pt uses [facility] on [facility]. Health Maintenance   Topic Date Due    Hepatitis C screen  Never done    Hepatitis B vaccine (1 of 3 - Risk 3-dose series) Never done    Pneumococcal 0-64 years Vaccine (2 - PCV) 10/15/2014    Colorectal Cancer Screen  Never done    Shingles Vaccine (1 of 2) Never done    Diabetic foot exam  10/09/2021    Diabetic microalbuminuria test  10/09/2021    A1C test (Diabetic or Prediabetic)  01/25/2022    Diabetic retinal exam  04/27/2022    Lipids  10/25/2022    Potassium  10/25/2022    Creatinine  10/25/2022    Depression Monitoring  11/18/2022    DTaP/Tdap/Td vaccine (2 - Td or Tdap) 11/19/2022    Annual Wellness Visit (AWV)  11/19/2022    Flu vaccine  Completed    COVID-19 Vaccine  Completed    HIV screen  Completed    Hepatitis A vaccine  Aged Out    Hib vaccine  Aged Out    Meningococcal (ACWY) vaccine  Aged Out             (applicable per patient's age: Cancer Screenings, Depression Screening, Fall Risk Screening, Immunizations)    Hemoglobin A1C (%)   Date Value   10/25/2021 11.5   04/09/2021 9.7   12/22/2020 10.1     Microalb/Crt.  Ratio (mcg/mg creat)   Date Value   10/09/2020 24 (H)     LDL Cholesterol (mg/dL)   Date Value   10/09/2020 85     LDL Calculated (mg/dL)   Date Value   10/25/2021 53     AST (U/L)   Date Value   10/09/2020 47 (H)     ALT (U/L)   Date Value   10/09/2020 74 (H)     BUN (mg/dL)   Date Value   10/09/2020 17      (goal A1C is < 7)   (goal LDL is <100) need 30-50% reduction from baseline     BP Readings from Last 3 Encounters:   11/18/21 120/86   09/21/21 134/88   08/16/21 122/80    (goal /80)      All Future Testing planned in [facility]:  Lab Frequency Next Occurrence   MRI LUMBAR SPINE WO CONTRAST Once 06/25/2021   Comprehensive Metabolic Panel Once 92/44/0453 Hemoglobin A1C Once 04/23/2022   Lipid Panel Once 04/23/2022       Next Visit Date:  Future Appointments   Date Time Provider Dwaine Pal   4/29/2022  2:00 PM Parish Vuong MD [place] [id number] [facility]   11/21/2022 10:30 AM MD Diana Hummel [facility]            Patient Active Problem List:     GERD (gastroesophageal reflux disease)     Carpal tunnel syndrome of right wrist     Benign essential HTN     Diabetic peripheral neuropathy (HCC)     Mixed hyperlipidemia     Vitamin D deficiency     Leg pain, left     Microalbuminuria     PVD (peripheral vascular disease) ([facility] [address]     Status post partial resection of colon     Cigarette smoker     Left carotid artery stenosis     Pain in upper limb     Situational mixed anxiety and depressive disorder     History of coronary artery bypass graft     H/O heart artery stent     History of DVT (deep vein thrombosis)     Chronic midline low back pain without sciatica

## 2022-04-27 DIAGNOSIS — F41.1 GENERALIZED ANXIETY DISORDER: ICD-10-CM

## 2022-04-27 RX ORDER — CILOSTAZOL 100 MG/1
TABLET ORAL
Qty: 60 TABLET | Refills: 5 | Status: SHIPPED | OUTPATIENT
Start: 2022-04-27 | End: 2022-10-28

## 2022-04-27 RX ORDER — LOSARTAN POTASSIUM 100 MG/1
100 TABLET ORAL DAILY
Qty: 30 TABLET | Refills: 5 | Status: SHIPPED | OUTPATIENT
Start: 2022-04-27

## 2022-04-27 RX ORDER — HYDROXYZINE HYDROCHLORIDE 25 MG/1
TABLET, FILM COATED ORAL
Qty: 90 TABLET | Refills: 5 | Status: SHIPPED | OUTPATIENT
Start: 2022-04-27 | End: 2022-10-28

## 2022-04-27 NOTE — TELEPHONE ENCOUNTER
Health Maintenance   Topic Date Due    Hepatitis C screen  Never done    Hepatitis B vaccine (1 of 3 - Risk 3-dose series) Never done    Pneumococcal 0-64 years Vaccine (2 - PCV) 10/15/2014    Colorectal Cancer Screen  Never done    Shingles Vaccine (1 of 2) Never done    Diabetic foot exam  10/09/2021    Diabetic microalbuminuria test  10/09/2021    A1C test (Diabetic or Prediabetic)  01/25/2022    Lipids  10/25/2022    Potassium  10/25/2022    Creatinine  10/25/2022    Depression Monitoring  11/18/2022    DTaP/Tdap/Td vaccine (2 - Td or Tdap) 11/19/2022    Annual Wellness Visit (AWV)  11/19/2022    Diabetic retinal exam  04/25/2023    Flu vaccine  Completed    COVID-19 Vaccine  Completed    HIV screen  Completed    Hepatitis A vaccine  Aged Out    Hib vaccine  Aged Out    Meningococcal (ACWY) vaccine  Aged Out             (applicable per patient's age: Cancer Screenings, Depression Screening, Fall Risk Screening, Immunizations)    Hemoglobin A1C (%)   Date Value   10/25/2021 11.5   04/09/2021 9.7   12/22/2020 10.1     Microalb/Crt.  Ratio (mcg/mg creat)   Date Value   10/09/2020 24 (H)     LDL Cholesterol (mg/dL)   Date Value   10/09/2020 85     LDL Calculated (mg/dL)   Date Value   10/25/2021 53     AST (U/L)   Date Value   10/09/2020 47 (H)     ALT (U/L)   Date Value   10/09/2020 74 (H)     BUN (mg/dL)   Date Value   10/09/2020 17      (goal A1C is < 7)   (goal LDL is <100) need 30-50% reduction from baseline     BP Readings from Last 3 Encounters:   11/18/21 120/86   09/21/21 134/88   08/16/21 122/80    (goal /80)      All Future Testing planned in [facility]:  Lab Frequency Next Occurrence   MRI LUMBAR SPINE WO CONTRAST Once 06/25/2021   Comprehensive Metabolic Panel Once 46/75/3554   Hemoglobin A1C Once 04/23/2022   Lipid Panel Once 04/23/2022       Next Visit Date:  Future Appointments   Date Time Provider Dwaine Pal   4/29/2022  2:00 PM Lazaro Daigle MD [place] [facility] 11/21/2022 10:30 AM MD Caprice Mosley [facility]            Patient Active Problem List:     GERD (gastroesophageal reflux disease)     Carpal tunnel syndrome of right wrist     Benign essential HTN     Diabetic peripheral neuropathy (HCC)     Mixed hyperlipidemia     Vitamin D deficiency     Leg pain, left     Microalbuminuria     PVD (peripheral vascular disease) (HCC)     Status post partial resection of colon     Cigarette smoker     Left carotid artery stenosis     Pain in upper limb     Situational mixed anxiety and depressive disorder     History of coronary artery bypass graft     H/O heart artery stent     History of DVT (deep vein thrombosis)     Chronic midline low back pain without sciatica

## 2022-04-29 ENCOUNTER — OFFICE VISIT (OUTPATIENT)
Dept: FAMILY MEDICINE CLINIC | Age: 52
End: 2022-04-29
Payer: MEDICARE

## 2022-04-29 VITALS
BODY MASS INDEX: 27.25 KG/M2 | HEART RATE: 93 BPM | SYSTOLIC BLOOD PRESSURE: 108 MMHG | WEIGHT: 184 LBS | HEIGHT: 69 IN | DIASTOLIC BLOOD PRESSURE: 70 MMHG

## 2022-04-29 DIAGNOSIS — I10 BENIGN ESSENTIAL HTN: ICD-10-CM

## 2022-04-29 DIAGNOSIS — Z12.5 PROSTATE CANCER SCREENING: ICD-10-CM

## 2022-04-29 DIAGNOSIS — I73.9 PVD (PERIPHERAL VASCULAR DISEASE) (HCC): ICD-10-CM

## 2022-04-29 DIAGNOSIS — Z23 NEED FOR 23-POLYVALENT PNEUMOCOCCAL POLYSACCHARIDE VACCINE: ICD-10-CM

## 2022-04-29 DIAGNOSIS — E11.65 UNCONTROLLED TYPE 2 DIABETES MELLITUS WITH HYPERGLYCEMIA (HCC): Primary | ICD-10-CM

## 2022-04-29 DIAGNOSIS — F41.1 GENERALIZED ANXIETY DISORDER: ICD-10-CM

## 2022-04-29 DIAGNOSIS — Z11.59 ENCOUNTER FOR HEPATITIS C SCREENING TEST FOR LOW RISK PATIENT: ICD-10-CM

## 2022-04-29 DIAGNOSIS — E78.2 MIXED HYPERLIPIDEMIA: ICD-10-CM

## 2022-04-29 LAB — HBA1C MFR BLD: 10.2 %

## 2022-04-29 PROCEDURE — 3046F HEMOGLOBIN A1C LEVEL >9.0%: CPT | Performed by: INTERNAL MEDICINE

## 2022-04-29 PROCEDURE — G0009 ADMIN PNEUMOCOCCAL VACCINE: HCPCS | Performed by: INTERNAL MEDICINE

## 2022-04-29 PROCEDURE — 99214 OFFICE O/P EST MOD 30 MIN: CPT | Performed by: INTERNAL MEDICINE

## 2022-04-29 PROCEDURE — 83036 HEMOGLOBIN GLYCOSYLATED A1C: CPT | Performed by: INTERNAL MEDICINE

## 2022-04-29 PROCEDURE — 90732 PPSV23 VACC 2 YRS+ SUBQ/IM: CPT | Performed by: INTERNAL MEDICINE

## 2022-04-29 RX ORDER — INSULIN LISPRO 100 [IU]/ML
3 INJECTION, SOLUTION INTRAVENOUS; SUBCUTANEOUS
Qty: 5 PEN | Refills: 3 | Status: SHIPPED | OUTPATIENT
Start: 2022-04-29

## 2022-04-29 RX ORDER — INSULIN DETEMIR 100 [IU]/ML
60 INJECTION, SOLUTION SUBCUTANEOUS 2 TIMES DAILY
Qty: 10 PEN | Refills: 3 | Status: SHIPPED
Start: 2022-04-29 | End: 2022-06-27

## 2022-04-29 ASSESSMENT — ENCOUNTER SYMPTOMS
SHORTNESS OF BREATH: 0
ABDOMINAL PAIN: 0
BLOOD IN STOOL: 0
SORE THROAT: 0
NAUSEA: 0
CONSTIPATION: 0
DIARRHEA: 0
RESPIRATORY NEGATIVE: 1

## 2022-04-29 NOTE — PATIENT INSTRUCTIONS
Survey: You may be receiving a survey from [employer] regarding your visit today. You may get this in the mail, through your MyChart or in your email. Please complete the survey to enable us to provide the highest quality of care to you and your family. Please also, mention our names. If you cannot score us as very good (5 Stars) on any question, please feel free to call the office to discuss how we could have made your experience exceptional.      Thank You! MD Dash Villatoro, Leah Covington, BSN RN    [facility], [address] [place] [address]      1. Need for 23-polyvalent pneumococcal polysaccharide vaccine  Pneumovax today. - PNEUMOVAX 23 subcutaneous/IM (Pneumococcal polysaccharide vaccine 23-valent >= 1yo)    2. Generalized anxiety disorder  Has been stable on Buspar / Atarax. 3. Uncontrolled type 2 diabetes mellitus with hyperglycemia ([address]  Watch a strict diabetic diet. Start a walking program.  Increase Levemir to 60 units BID. Start on Humalog 3 units with each meal.  Call with am BS readings weekly  Obtain labs approximately one week prior to the office appointment. Please fast for 12 hours prior to obtaining the labs. Water or black coffee (no cream or sugar) is allowed prior to the the labs. -  DIABETES FOOT EXAM  - POCT glycosylated hemoglobin (Hb A1C)  - Microalbumin / Creatinine Urine Ratio; Future  - insulin detemir (LEVEMIR FLEXTOUCH) 100 UNIT/ML injection pen; Inject 60 Units into the skin 2 times daily  Dispense: 10 pen; Refill: 3  - insulin lispro, 1 Unit Dial, (HUMALOG KWIKPEN) 100 UNIT/ML SOPN; Inject 3 Units into the skin 3 times daily (before meals)  Dispense: 5 pen; Refill: 3  - Hemoglobin A1C; Future    4. Mixed hyperlipidemia  Controlled on statin. Obtain labs approximately one week prior to the office appointment. Please fast for 12 hours prior to obtaining the labs. Water or black coffee (no cream or sugar) is allowed prior to the the labs.     - Lipid Panel; Future    5. Prostate cancer screening  PSA with labs. - PSA Screening; Future    6. Benign essential HTN  Controlled on Amlodipine and Losartan. No change in treatment. - Comprehensive Metabolic Panel; Future    7. Encounter for hepatitis C screening test for low risk patient  Hep C with next labs. - Hepatitis C Antibody; Future    8. PVD (peripheral vascular disease) ([address]  Refer to vascular for evaluation as PVD is likely causing leg weakness. - External Referral To Vascular Surgery    Leola Doran was instructed to follow up in the clinic in 3 months for check up or as needed with any medical issues.

## 2022-04-29 NOTE — PROGRESS NOTES
CHECK ONBASE FOR SCAN     Cheryl Marie (:  1970) is a 47 y.o. male,Established patient, here for evaluation of the following chief complaint(s):  Hypertension (check up, will need labs for next appt, POCT a1c today 10.2), Diabetes Mellitus, Hyperlipidemia, Health Maintenance (items reviewed, PBU 23 today), and Leg Pain (complains of leg pain and weakness on side affected by stroke)         ASSESSMENT/PLAN:  1. Uncontrolled type 2 diabetes mellitus with hyperglycemia (HCC)  -      DIABETES FOOT EXAM  -     POCT glycosylated hemoglobin (Hb A1C)  -     Microalbumin / Creatinine Urine Ratio; Future  -     insulin detemir (LEVEMIR FLEXTOUCH) 100 UNIT/ML injection pen; Inject 60 Units into the skin 2 times daily, Disp-10 pen, R-3NO PRINT  -     insulin lispro, 1 Unit Dial, (HUMALOG KWIKPEN) 100 UNIT/ML SOPN; Inject 3 Units into the skin 3 times daily (before meals), Disp-5 pen, R-3Normal  -     Hemoglobin A1C; Future  2. Need for 23-polyvalent pneumococcal polysaccharide vaccine  -     PNEUMOVAX 23 subcutaneous/IM (Pneumococcal polysaccharide vaccine 23-valent >= 3yo)  3. Generalized anxiety disorder  4. Mixed hyperlipidemia  -     Lipid Panel; Future  5. Prostate cancer screening  -     PSA Screening; Future  6. Benign essential HTN  -     Comprehensive Metabolic Panel; Future  7. Encounter for hepatitis C screening test for low risk patient  -     Hepatitis C Antibody; Future  8. PVD (peripheral vascular disease) ([facility] [address]  -     External Referral To Vascular Surgery      Plan:  1. Need for 23-polyvalent pneumococcal polysaccharide vaccine  Pneumovax today. - PNEUMOVAX 23 subcutaneous/IM (Pneumococcal polysaccharide vaccine 23-valent >= 3yo)    2. Generalized anxiety disorder  Has been stable on Buspar / Atarax. 3. Uncontrolled type 2 diabetes mellitus with hyperglycemia ([facility] [address]  Watch a strict diabetic diet. Start a walking program.  Increase Levemir to 60 units BID.   Start on Humalog 3 units with each meal.  Call with am BS readings weekly  Obtain labs approximately one week prior to the office appointment. Please fast for 12 hours prior to obtaining the labs. Water or black coffee (no cream or sugar) is allowed prior to the the labs. -  DIABETES FOOT EXAM  - POCT glycosylated hemoglobin (Hb A1C)  - Microalbumin / Creatinine Urine Ratio; Future  - insulin detemir (LEVEMIR FLEXTOUCH) 100 UNIT/ML injection pen; Inject 60 Units into the skin 2 times daily  Dispense: 10 pen; Refill: 3  - insulin lispro, 1 Unit Dial, (HUMALOG KWIKPEN) 100 UNIT/ML SOPN; Inject 3 Units into the skin 3 times daily (before meals)  Dispense: 5 pen; Refill: 3  - Hemoglobin A1C; Future    4. Mixed hyperlipidemia  Controlled on statin. Obtain labs approximately one week prior to the office appointment. Please fast for 12 hours prior to obtaining the labs. Water or black coffee (no cream or sugar) is allowed prior to the the labs. - Lipid Panel; Future    5. Prostate cancer screening  PSA with labs. - PSA Screening; Future    6. Benign essential HTN  Controlled on Amlodipine and Losartan. No change in treatment. - Comprehensive Metabolic Panel; Future    7. Encounter for hepatitis C screening test for low risk patient  Hep C with next labs. - Hepatitis C Antibody; Future    8. PVD (peripheral vascular disease) ([facility] [address]  Refer to vascular for evaluation as PVD is likely causing leg weakness. - External Referral To Vascular Surgery    Abelardodavid Johnson was instructed to follow up in the clinic in 3 months for check up or as needed with any medical issues. Subjective   SUBJECTIVE/OBJECTIVE:  Onel Rosario presents for a check up on his medical conditions HTN, DM II, Hyperlipidemia, PVD. Preston admits to new problems. Medications were reviewed with Onel Rosario, he is  tolerating the medication. Bowels are regular. There has not been rectal bleeding. Onel Rosario denies urinary complications, the urine stream is good.   Preston denies chest pain and denies increasing shortness of breath. Left leg has been causing more pain and seem to \"fold up on me\" when walking. Past Medical History:  No date: GERD (gastroesophageal reflux disease)  No date: Hyperlipidemia  No date: Hypertension  No date: Microalbuminuria  No date: PAD (peripheral artery disease) (HCC)    Past Surgical History:  No date: APPENDECTOMY  8/2012: ARTERIAL BYPASS SURGRY  2006: CARDIAC SURGERY  08/2019: COLECTOMY      Comment:  sigmoid resection ([facility])  06/16/2020: HERNIA REPAIR      Comment:  Dr Karan Davalos  08/2019: SMALL INTESTINE SURGERY      Comment:  small bowel resection ([facility])    Social History    Socioeconomic History      Marital status:       Spouse name: Not on file      Number of children: Not on file      Years of education: Not on file      Highest education level: Not on file    Occupational History      Occupation: disability    Tobacco Use      Smoking status: Current Some Day Smoker        Packs/day: 0.25        Years: 20.00        Pack years: 5        Types: Cigarettes        Quit date: 10/27/2011        Years since quitting: 10.5      Smokeless tobacco: Never Used      Tobacco comment: wife smokes, passive    Substance and Sexual Activity      Alcohol use: Not on file      Drug use: Not on file      Sexual activity: Not on file    Other Topics      Concerns:        Not on file    Social History Narrative      Not on file    Social Determinants of Health  Financial Resource Strain: Low Risk       Difficulty of Paying Living Expenses: Not hard at all  Food Insecurity: No Food Insecurity      Worried About Running Out of Food in the Last Year: Never true      Ran Out of Food in the Last Year: Never true  Transportation Needs:       Lack of Transportation (Medical): Not on file      Lack of Transportation (Non-Medical):  Not on file  Physical Activity:       Days of Exercise per Week: Not on file      Minutes of Exercise per Session: Not on file  Stress:       Feeling of Stress : Not on file  Social Connections:       Frequency of Communication with Friends and Family: Not on file      Frequency of Social Gatherings with Friends and Family: Not on file      Attends Congregational Services: Not on file      Active Member of Clubs or Organizations: Not on file      Attends Club or Organization Meetings: Not on file      Marital Status: Not on file  Intimate Partner Violence:       Fear of Current or Ex-Partner: Not on file      Emotionally Abused: Not on file      Physically Abused: Not on file      Sexually Abused: Not on file  Housing Stability:       Unable to Pay for Housing in the Last Year: Not on file      Number of Places Lived in the Last Year: Not on file      Unstable Housing in the Last Year: Not on file    No family history on file. Current Outpatient Medications on File Prior to Visit:  losartan (COZAAR) 100 MG tablet, Take 1 tablet by mouth daily, Disp: 30 tablet, Rfl: 5  hydrOXYzine (ATARAX) 25 MG tablet, TAKE 1 TABLET BY MOUTH THREE TIMES DAILY AS NEEDED FOR ANXIETY, Disp: 90 tablet, Rfl: 5  cilostazol (PLETAL) 100 MG tablet, TAKE 1 TABLET BY MOUTH TWICE DAILY, Disp: 60 tablet, Rfl: 5  azithromycin (ZITHROMAX Z-VIVEK) 250 MG tablet, Two tablets by mouth the first day  then one tablet daily for 4 days. , Disp: 1 packet, Rfl: 0  gabapentin (NEURONTIN) 300 MG capsule, Take 1 capsule by mouth 3 times daily for 91 days. , Disp: 90 capsule, Rfl: 2  insulin detemir (LEVEMIR FLEXTOUCH) 100 UNIT/ML injection pen, Inject 50 Units into the skin 2 times daily, Disp: 10 pen, Rfl: 3  Insulin Pen Needle 31G X 5 MM MISC, 1 each by Does not apply route daily, Disp: 100 each, Rfl: 5  rosuvastatin (CRESTOR) 10 MG tablet, Take 1 tablet by mouth nightly, Disp: 30 tablet, Rfl: 5  amLODIPine (NORVASC) 5 MG tablet, TAKE 1 TABLET BY MOUTH EVERY DAY, Disp: 30 tablet, Rfl: 5  pantoprazole (PROTONIX) 40 MG tablet, TAKE 2 TABLETS BY MOUTH DAILY, Disp: 60 tablet, Rfl: 3  venlafaxine (EFFEXOR XR) 75 MG Value               Date                       NA                       134 (L)             10/09/2020                 K                        4.9                 10/25/2021                 CL                       98                  10/09/2020                 CO2                      21                  10/09/2020                 BUN                      17                  10/09/2020                 CREATININE               0.98                10/25/2021                 GLUCOSE                  199 (H)             10/09/2020                 CALCIUM                  10.0                10/09/2020                 PROT                     7.0                 10/09/2020                 LABALBU                  4.8                 10/09/2020                 BILITOT                  0.45                10/09/2020                 ALKPHOS                  105                 10/09/2020                 AST                      47 (H)              10/09/2020                 ALT                      74 (H)              10/09/2020                 LABGLOM                  >60                 10/09/2020                 GFRAA                    >60                 10/09/2020              Lab Results       Component                Value               Date                       LABA1C                   10.2                04/29/2022            Lab Results       Component                Value               Date                       EAG                      203                 01/14/2019              Lab Results       Component                Value               Date                       CHOL                     118                 10/25/2021                 CHOL                     197                 04/09/2021                 CHOL                     179                 12/22/2020            Lab Results       Component                Value               Date                       TRIG                     202 10/25/2021                 TRIG                     367                 04/09/2021                 TRIG                     295                 12/22/2020            Lab Results       Component                Value               Date                       HDL                      32 (A)              10/25/2021                 HDL                      33 (A)              04/09/2021                 HDL                      39                  12/22/2020            Lab Results       Component                Value               Date                       LDLCHOLESTEROL           85                  10/09/2020                 LDLCHOLESTEROL           139 (H)             01/14/2019                 LDLCHOLESTEROL           129                 10/08/2018                 LDLCALC                  53                  10/25/2021                 LDLCALC                  91                  04/09/2021                 LDLCALC                  91                  12/22/2020            Lab Results       Component                Value               Date                       VLDL                     33                  10/25/2021                 VLDL                     49                  12/22/2020                 VLDL                                         10/09/2020             NOT REPORTED (H)  Lab Results       Component                Value               Date                       CHOLHDLRATIO             6.0                 04/09/2021                 CHOLHDLRATIO             5.7 (H)             10/09/2020                 CHOLHDLRATIO             6.3 (H)             01/14/2019                              Hypertension  This is a chronic problem. The current episode started more than 1 year ago. The problem is unchanged. The problem is controlled. Pertinent negatives include no chest pain, neck pain, palpitations or shortness of breath. Past treatments include beta blockers and angiotensin blockers.  The current treatment provides significant improvement. There are no compliance problems. There is no history of angina. Hyperlipidemia  This is a chronic problem. The current episode started more than 1 year ago. The problem is controlled. Recent lipid tests were reviewed and are normal. Pertinent negatives include no chest pain, myalgias or shortness of breath. Current antihyperlipidemic treatment includes statins. The current treatment provides significant improvement of lipids. There are no compliance problems. Review of Systems   Constitutional: Negative. HENT: Negative for congestion, ear pain, postnasal drip, sneezing and sore throat. Eyes: Negative for visual disturbance. Respiratory: Negative. Negative for shortness of breath. Cardiovascular: Negative for chest pain, palpitations and leg swelling. Gastrointestinal: Negative for abdominal pain, blood in stool, constipation, diarrhea and nausea. Genitourinary: Negative for difficulty urinating, dysuria, frequency and urgency. Musculoskeletal: Positive for arthralgias. Negative for joint swelling, myalgias, neck pain and neck stiffness. Skin: Negative. Neurological: Negative for syncope. Psychiatric/Behavioral: Negative. Objective   Physical Exam  Vitals and nursing note reviewed. Constitutional:       Appearance: He is well-developed. HENT:      Head: Atraumatic. Eyes:      Conjunctiva/sclera: Conjunctivae normal.   Cardiovascular:      Rate and Rhythm: Normal rate and regular rhythm. Heart sounds: Normal heart sounds. Pulmonary:      Effort: Pulmonary effort is normal.      Breath sounds: Normal breath sounds. Abdominal:      Palpations: Abdomen is soft. Tenderness: There is no abdominal tenderness. Musculoskeletal:      Cervical back: Normal range of motion and neck supple. Lymphadenopathy:      Cervical: No cervical adenopathy. Skin:     Findings: No rash. Neurological:      Mental Status: He is alert. Psychiatric:         Behavior: Behavior normal.         Thought Content: Thought content normal.                  An electronic signature was used to authenticate this note.     --Aquilino Mg MD

## 2022-05-06 ENCOUNTER — TELEPHONE (OUTPATIENT)
Dept: FAMILY MEDICINE CLINIC | Age: 52
End: 2022-05-06

## 2022-05-06 NOTE — TELEPHONE ENCOUNTER
Pt called in his fasting BS readings:      250  178  139  245  189  118  111  208-Today    Patient is taking 60 units BID and 3 units at meal time. Pt states he stopped drinking soda and he is watching his diet better. Patient states he also has been eating late at night and knows this has contributed to elevated morning BS readings. Patient also wanted to make Dr Mathias aware that because he is watching his diet and not drinking soda he is having lower BS readings during the day. After eating his BS is 108 and he complains of dizziness.

## 2022-05-06 NOTE — TELEPHONE ENCOUNTER
Maintain where he is. The 108 is great, don't want below 80. His body will eventually adjust to a normal BS (does not know his BS is suppose to be this low since it has been averaging in the 200 range). Decrease late night eating.

## 2022-05-18 ENCOUNTER — TELEPHONE (OUTPATIENT)
Dept: FAMILY MEDICINE CLINIC | Age: 52
End: 2022-05-18

## 2022-05-18 NOTE — TELEPHONE ENCOUNTER
Pt called to report BS readings:      242  167  213  182  205  197  137-Today    Patient is taking 60 unites BID. Pt states some days, during the day BS drops to 70.

## 2022-05-19 ENCOUNTER — OFFICE VISIT (OUTPATIENT)
Dept: FAMILY MEDICINE CLINIC | Age: 52
End: 2022-05-19
Payer: MEDICARE

## 2022-05-19 VITALS
SYSTOLIC BLOOD PRESSURE: 118 MMHG | DIASTOLIC BLOOD PRESSURE: 70 MMHG | BODY MASS INDEX: 27.85 KG/M2 | HEART RATE: 84 BPM | WEIGHT: 188 LBS | OXYGEN SATURATION: 98 % | HEIGHT: 69 IN

## 2022-05-19 DIAGNOSIS — J06.9 VIRAL URI: Primary | ICD-10-CM

## 2022-05-19 DIAGNOSIS — R19.7 DIARRHEA OF PRESUMED INFECTIOUS ORIGIN: ICD-10-CM

## 2022-05-19 PROCEDURE — 99213 OFFICE O/P EST LOW 20 MIN: CPT | Performed by: INTERNAL MEDICINE

## 2022-05-19 RX ORDER — POTASSIUM CHLORIDE 750 MG/1
TABLET, EXTENDED RELEASE ORAL
COMMUNITY
Start: 2022-05-18

## 2022-05-19 ASSESSMENT — ENCOUNTER SYMPTOMS
BLOOD IN STOOL: 0
NAUSEA: 0
SORE THROAT: 0
ABDOMINAL PAIN: 0
DIARRHEA: 1
RESPIRATORY NEGATIVE: 1
CONSTIPATION: 0

## 2022-05-19 ASSESSMENT — PATIENT HEALTH QUESTIONNAIRE - PHQ9
8. MOVING OR SPEAKING SO SLOWLY THAT OTHER PEOPLE COULD HAVE NOTICED. OR THE OPPOSITE, BEING SO FIGETY OR RESTLESS THAT YOU HAVE BEEN MOVING AROUND A LOT MORE THAN USUAL: 0
2. FEELING DOWN, DEPRESSED OR HOPELESS: 0
SUM OF ALL RESPONSES TO PHQ QUESTIONS 1-9: 0
6. FEELING BAD ABOUT YOURSELF - OR THAT YOU ARE A FAILURE OR HAVE LET YOURSELF OR YOUR FAMILY DOWN: 0
SUM OF ALL RESPONSES TO PHQ QUESTIONS 1-9: 0
4. FEELING TIRED OR HAVING LITTLE ENERGY: 0
9. THOUGHTS THAT YOU WOULD BE BETTER OFF DEAD, OR OF HURTING YOURSELF: 0
5. POOR APPETITE OR OVEREATING: 0
3. TROUBLE FALLING OR STAYING ASLEEP: 0
SUM OF ALL RESPONSES TO PHQ QUESTIONS 1-9: 0
1. LITTLE INTEREST OR PLEASURE IN DOING THINGS: 0
SUM OF ALL RESPONSES TO PHQ9 QUESTIONS 1 & 2: 0
SUM OF ALL RESPONSES TO PHQ QUESTIONS 1-9: 0
7. TROUBLE CONCENTRATING ON THINGS, SUCH AS READING THE NEWSPAPER OR WATCHING TELEVISION: 0

## 2022-05-19 NOTE — TELEPHONE ENCOUNTER
Continue the current dose of insulin. Make sure he doesn't miss meals (should have a consistent diet to prevent low BS readings).

## 2022-05-19 NOTE — PATIENT INSTRUCTIONS
Survey: You may be receiving a survey from [employer] regarding your visit today. You may get this in the mail, through your MyChart or in your email. Please complete the survey to enable us to provide the highest quality of care to you and your family. Please also, mention our names. If you cannot score us as very good (5 Stars) on any question, please feel free to call the office to discuss how we could have made your experience exceptional.      Thank You! MD Brooke Pinzon, Mathew Covington, BSN RN    [facility]      1. Viral URI  Symptoms appear to be viral, especially since this was associated with diarrhea. Recommend OTC tylenol cold and sinus. Continue on Flonase to help open up the eustachian tube to help with ear fullness. 2. Diarrhea of presumed infectious origin  Recommend a probiotic daily and using Imodium OTC as needed. Erin Saeed is instructed to return to the clinic if the symptoms continue or worsen. Erin Saeed  was also instructed to go to the emergency room department if the symptoms significantly worsen before an appointment can be made.

## 2022-05-19 NOTE — PROGRESS NOTES
Alicia Martinez (:  1970) is a 47 y.o. male,Established patient, here for evaluation of the following chief complaint(s):  URI (sx's 2 weeks w/ congestion. Denies cough, sore throat. ), Diarrhea (just started strict DM diet w/ low sugar), and Ear Fullness (has trie flonase with some relief)         ASSESSMENT/PLAN:  1. Viral URI  2. Diarrhea of presumed infectious origin    Plan:  1. Viral URI  Symptoms appear to be viral, especially since this was associated with diarrhea. Recommend OTC tylenol cold and sinus. Continue on Flonase to help open up the eustachian tube to help with ear fullness. 2. Diarrhea of presumed infectious origin  Recommend a probiotic daily and using Imodium OTC as needed. Kailey Bajwa is instructed to return to the clinic if the symptoms continue or worsen. Kailey Bajwa  was also instructed to go to the emergency room department if the symptoms significantly worsen before an appointment can be made. Subjective   SUBJECTIVE/OBJECTIVE:  URI   This is a new problem. Episode onset: 6 days. The problem has been waxing and waning. There has been no fever. Associated symptoms include congestion, diarrhea, ear pain and a plugged ear sensation. Pertinent negatives include no abdominal pain, chest pain, dysuria, nausea, neck pain, sneezing or sore throat. He has tried nothing for the symptoms. Review of Systems   Constitutional: Negative. HENT: Positive for congestion and ear pain. Negative for postnasal drip, sneezing and sore throat. Eyes: Negative for visual disturbance. Respiratory: Negative. Cardiovascular: Negative for chest pain, palpitations and leg swelling. Gastrointestinal: Positive for diarrhea. Negative for abdominal pain, blood in stool, constipation and nausea. Genitourinary: Negative for difficulty urinating, dysuria, frequency and urgency. Musculoskeletal: Negative for arthralgias, joint swelling, myalgias, neck pain and neck stiffness.    Skin: Negative. Neurological: Negative for syncope. Psychiatric/Behavioral: Negative. Objective   Physical Exam  Vitals and nursing note reviewed. Constitutional:       Appearance: He is well-developed. HENT:      Head: Atraumatic. Ears:      Comments: Left TM with small effusion and retracted. Nose: Congestion present. Eyes:      Conjunctiva/sclera: Conjunctivae normal.   Cardiovascular:      Rate and Rhythm: Normal rate and regular rhythm. Heart sounds: Normal heart sounds. Pulmonary:      Effort: Pulmonary effort is normal.      Breath sounds: Normal breath sounds. Abdominal:      Palpations: Abdomen is soft. Tenderness: There is no abdominal tenderness. Musculoskeletal:      Cervical back: Normal range of motion and neck supple. Lymphadenopathy:      Cervical: No cervical adenopathy. Skin:     Findings: No rash. Neurological:      Mental Status: He is alert. Psychiatric:         Behavior: Behavior normal.         Thought Content: Thought content normal.                  An electronic signature was used to authenticate this note.     --Sher Rogers MD

## 2022-05-20 DIAGNOSIS — M54.50 CHRONIC MIDLINE LOW BACK PAIN WITHOUT SCIATICA: ICD-10-CM

## 2022-05-20 DIAGNOSIS — G89.29 CHRONIC MIDLINE LOW BACK PAIN WITHOUT SCIATICA: ICD-10-CM

## 2022-05-20 RX ORDER — CALCIUM CARBONATE 300MG(750)
400 TABLET,CHEWABLE ORAL DAILY
Qty: 30 TABLET | Refills: 1
Start: 2022-05-20

## 2022-05-23 RX ORDER — CYCLOBENZAPRINE HCL 10 MG
10 TABLET ORAL NIGHTLY PRN
Qty: 90 TABLET | Refills: 1 | Status: SHIPPED | OUTPATIENT
Start: 2022-05-23 | End: 2022-08-25

## 2022-05-27 DIAGNOSIS — K21.9 GASTROESOPHAGEAL REFLUX DISEASE WITHOUT ESOPHAGITIS: ICD-10-CM

## 2022-05-27 DIAGNOSIS — M54.50 CHRONIC MIDLINE LOW BACK PAIN WITHOUT SCIATICA: ICD-10-CM

## 2022-05-27 DIAGNOSIS — E11.42 TYPE 2 DIABETES MELLITUS WITH DIABETIC POLYNEUROPATHY, WITH LONG-TERM CURRENT USE OF INSULIN (HCC): ICD-10-CM

## 2022-05-27 DIAGNOSIS — G89.29 CHRONIC MIDLINE LOW BACK PAIN WITHOUT SCIATICA: ICD-10-CM

## 2022-05-27 DIAGNOSIS — I10 BENIGN ESSENTIAL HTN: ICD-10-CM

## 2022-05-27 DIAGNOSIS — Z79.4 TYPE 2 DIABETES MELLITUS WITH DIABETIC POLYNEUROPATHY, WITH LONG-TERM CURRENT USE OF INSULIN (HCC): ICD-10-CM

## 2022-05-27 DIAGNOSIS — E11.42 DIABETIC PERIPHERAL NEUROPATHY (HCC): ICD-10-CM

## 2022-05-27 RX ORDER — GABAPENTIN 300 MG/1
CAPSULE ORAL
Qty: 90 CAPSULE | Refills: 2 | Status: SHIPPED | OUTPATIENT
Start: 2022-05-27 | End: 2022-09-28 | Stop reason: SDUPTHER

## 2022-05-27 RX ORDER — PANTOPRAZOLE SODIUM 40 MG/1
TABLET, DELAYED RELEASE ORAL
Qty: 60 TABLET | Refills: 2 | Status: SHIPPED | OUTPATIENT
Start: 2022-05-27 | End: 2022-08-11

## 2022-05-27 RX ORDER — METOPROLOL TARTRATE 50 MG/1
TABLET, FILM COATED ORAL
Qty: 60 TABLET | Refills: 2 | Status: SHIPPED | OUTPATIENT
Start: 2022-05-27 | End: 2022-09-28 | Stop reason: SDUPTHER

## 2022-06-26 DIAGNOSIS — E11.65 UNCONTROLLED TYPE 2 DIABETES MELLITUS WITH HYPERGLYCEMIA (HCC): ICD-10-CM

## 2022-06-27 RX ORDER — INSULIN DETEMIR 100 [IU]/ML
INJECTION, SOLUTION SUBCUTANEOUS
Qty: 30 ML | Refills: 3 | Status: SHIPPED | OUTPATIENT
Start: 2022-06-27 | End: 2022-08-23 | Stop reason: SDUPTHER

## 2022-06-27 NOTE — TELEPHONE ENCOUNTER
Health Maintenance   Topic Date Due    Hepatitis C screen  Never done    Hepatitis B vaccine (1 of 3 - Risk 3-dose series) Never done    Colorectal Cancer Screen  Never done    Shingles vaccine (1 of 2) Never done    Diabetic microalbuminuria test  10/09/2021    A1C test (Diabetic or Prediabetic)  07/29/2022    Lipids  10/25/2022    DTaP/Tdap/Td vaccine (2 - Td or Tdap) 11/19/2022    Annual Wellness Visit (AWV)  11/19/2022    Diabetic retinal exam  04/25/2023    Diabetic foot exam  04/29/2023    Pneumococcal 0-64 years Vaccine (2 - PCV) 04/29/2023    Depression Monitoring  05/19/2023    Flu vaccine  Completed    COVID-19 Vaccine  Completed    HIV screen  Completed    Hepatitis A vaccine  Aged Out    Hib vaccine  Aged Out    Meningococcal (ACWY) vaccine  Aged Out             (applicable per patient's age: Cancer Screenings, Depression Screening, Fall Risk Screening, Immunizations)    Hemoglobin A1C (%)   Date Value   04/29/2022 10.2   10/25/2021 11.5   04/09/2021 9.7     Microalb/Crt.  Ratio (mcg/mg creat)   Date Value   10/09/2020 24 (H)     LDL Cholesterol (mg/dL)   Date Value   10/09/2020 85     LDL Calculated (mg/dL)   Date Value   10/25/2021 53     AST (U/L)   Date Value   10/09/2020 47 (H)     ALT (U/L)   Date Value   10/09/2020 74 (H)     BUN (mg/dL)   Date Value   10/09/2020 17      (goal A1C is < 7)   (goal LDL is <100) need 30-50% reduction from baseline     BP Readings from Last 3 Encounters:   05/19/22 118/70   04/29/22 108/70   11/18/21 120/86    (goal /80)      All Future Testing planned in [facility]:  Lab Frequency Next Occurrence   Comprehensive Metabolic Panel Once 67/61/1654   Lipid Panel Once 10/29/2022   Microalbumin / Creatinine Urine Ratio Once 10/29/2022   PSA Screening Once 10/29/2022   Hepatitis C Antibody Once 04/29/2022   Hemoglobin A1C Once 07/29/2022       Next Visit Date:  Future Appointments   Date Time Provider Dwaine Pal   8/9/2022 10:45 AM Mike Mendez MD Blaine PEREZ [facility]   11/21/2022 10:30 AM MD Blaine Silveira [facility]            Patient Active Problem List:     GERD (gastroesophageal reflux disease)     Carpal tunnel syndrome of right wrist     Benign essential HTN     Diabetic peripheral neuropathy (HCC)     Mixed hyperlipidemia     Vitamin D deficiency     Leg pain, left     Microalbuminuria     PVD (peripheral vascular disease) (HCC)     Status post partial resection of colon     Cigarette smoker     Left carotid artery stenosis     Pain in upper limb     Situational mixed anxiety and depressive disorder     History of coronary artery bypass graft     H/O heart artery stent     History of DVT (deep vein thrombosis)     Chronic midline low back pain without sciatica

## 2022-06-28 NOTE — TELEPHONE ENCOUNTER
NEPHROLOGY PROGRESS NOTE    Patient: Marck Garcia               Sex: male          DOA: 6/21/2022  8:39 PM   YOB: 1969        Age:  47 y o         LOS:  LOS: 7 days   6/28/2022    REASON FOR THE CONSULTATION:  Acute kidney injury    SUBJECTIVE     Seen and examined sitting upright in bed  Offers no complaints today  States may be transferred to [facility] today per Cardiology  Reviewed past 24 hour events      CURRENT MEDICATIONS       Current Facility-Administered Medications:     acetaminophen (TYLENOL) tablet 975 mg, 975 mg, Oral, Q8H PRN, Tianna Heap, CRNP    amiodarone tablet 400 mg, 400 mg, Oral, BID With Meals, Tianna Heap, CRNP, 400 mg at 06/28/22 0803    aspirin (ECOTRIN LOW STRENGTH) EC tablet 81 mg, 81 mg, Oral, Daily, HINA Newell-C, 81 mg at 06/28/22 0804    atorvastatin (LIPITOR) tablet 40 mg, 40 mg, Oral, Daily With Alease Cyphers, DO    cefpodoxime Tomy Mungo) tablet 200 mg, 200 mg, Oral, BID With Meals, Rebbecca Trip, DO, 200 mg at 06/28/22 0807    clopidogrel (PLAVIX) tablet 75 mg, 75 mg, Oral, Daily, Chuckie Castellanos PA-C, 75 mg at 06/28/22 0803    digoxin (LANOXIN) tablet 125 mcg, 125 mcg, Oral, Daily, Tianna Heap, CRNP, 125 mcg at 06/28/22 0803    diphenhydrAMINE-zinc acetate (BENADRYL) 2-0 1 % cream, , Topical, TID PRN, Rebbecca Trip, DO    folic acid (FOLVITE) tablet 1 mg, 1 mg, Oral, Daily, Tianna Heap, CRNP, 1 mg at 06/28/22 0802    furosemide (LASIX) tablet 40 mg, 40 mg, Oral, Daily, HINA Newell-C, 40 mg at 06/28/22 0803    heparin (porcine) 25,000 units in D5W 250 mL infusion (premix), 3-20 Units/kg/hr (Order-Specific), Intravenous, Titrated, Tianna Vinap, CRNP, Last Rate: 22 6 mL/hr at 06/28/22 0445, 25 1 Units/kg/hr at 06/28/22 0445    heparin (porcine) injection 2,000 Units, 2,000 Units, Intravenous, Q1H PRN, JEROD Sibley, 2,000 Units at 06/24/22 2149    heparin (porcine) injection 4,000 Units, 4,000 Units, Intravenous, Q1H PRN, [place] Kenton, CRNP, 4,000 Units at 06/23/22 0857    insulin lispro (HumaLOG) 100 units/mL subcutaneous injection 1-5 Units, 1-5 Units, Subcutaneous, TID AC, 1 Units at 06/23/22 0857 **AND** Fingerstick Glucose (POCT), , , TID AC, [place] Kenton, CRNP    insulin lispro (HumaLOG) 100 units/mL subcutaneous injection 1-5 Units, 1-5 Units, Subcutaneous, HS, [place] Kenton, CRNP    metoprolol (LOPRESSOR) injection 5 mg, 5 mg, Intravenous, Q6H PRN, [place] Kenton, CRNP, 5 mg at 06/23/22 0857    metoprolol tartrate (LOPRESSOR) tablet 50 mg, 50 mg, Oral, Q12H [facility], Claudeen Cea, MD, 50 mg at 06/28/22 0802    multivitamin-minerals (CENTRUM) tablet 1 tablet, 1 tablet, Oral, Daily, [place] Kenton, CRNP, 1 tablet at 06/28/22 0803    ondansetron (ZOFRAN) injection 4 mg, 4 mg, Intravenous, Q6H PRN, Patricia Tompkins MD    potassium chloride (K-DUR,KLOR-CON) CR tablet 40 mEq, 40 mEq, Oral, Daily, Oley Fabian, DO, 40 mEq at 06/28/22 0801    thiamine tablet 100 mg, 100 mg, Oral, Daily, [place] Kenton, CRNP, 100 mg at 06/28/22 0802    REVIEW OF SYSTEMS     Review of Systems   Constitutional: Negative for activity change, chills, fatigue and fever  HENT: Negative for hearing loss, nosebleeds and trouble swallowing  Eyes: Negative for visual disturbance  Respiratory: Negative for cough and shortness of breath  Cardiovascular: Negative for chest pain and leg swelling  Gastrointestinal: Negative for abdominal pain, constipation, diarrhea, nausea and vomiting  Genitourinary: Negative for difficulty urinating, dysuria, frequency and hematuria  Musculoskeletal: Negative for arthralgias, back pain and myalgias  Skin: Negative for color change and pallor  Neurological: Negative for dizziness, syncope, weakness and light-headedness  Psychiatric/Behavioral: Negative for confusion and sleep disturbance  The patient is not nervous/anxious  Date Time Provider Dwaine Pal   2/21/2022 10:30 AM Pako Presley MD [place] [id number] [facility]   11/21/2022 10:30 AM Pako Presley MD [facility]            Patient Active Problem List:     GERD (gastroesophageal reflux disease)     Carpal tunnel syndrome of right wrist     Benign essential HTN     Diabetic peripheral neuropathy (HCC)     Mixed hyperlipidemia     Vitamin D deficiency     Leg pain, left     Microalbuminuria     PVD (peripheral vascular disease) (HCC)     Status post partial resection of colon     Cigarette smoker     Left carotid artery stenosis     Pain in upper limb     Situational mixed anxiety and depressive disorder     History of coronary artery bypass graft     H/O heart artery stent     History of DVT (deep vein thrombosis)     Chronic midline low back pain without sciatica OBJECTIVE     Current Weight: Weight - Scale: 124 kg (272 lb 4 3 oz)  Vitals:    06/28/22 0659   BP: 120/84   Pulse: (!) 112   Resp: 17   Temp: 97 6 °F (36 4 °C)   SpO2: 93%     Body mass index is 34 96 kg/m²  Intake/Output Summary (Last 24 hours) at 6/28/2022 1107  Last data filed at 6/28/2022 5058  Gross per 24 hour   Intake 2151 81 ml   Output 5 ml   Net 2146 81 ml       PHYSICAL EXAMINATION     Physical Exam  Vitals reviewed  Constitutional:       General: He is not in acute distress  HENT:      Head: Normocephalic  Mouth/Throat:      Lips: Pink  Mouth: Mucous membranes are moist    Eyes:      General: Lids are normal  No scleral icterus  Cardiovascular:      Rate and Rhythm: Normal rate and regular rhythm  Heart sounds: S1 normal and S2 normal  No murmur heard  Pulmonary:      Effort: Pulmonary effort is normal  No accessory muscle usage or respiratory distress  Abdominal:      General: There is no distension  Tenderness: There is no abdominal tenderness  Musculoskeletal:      Cervical back: Normal range of motion and neck supple  No tenderness  Skin:     General: Skin is warm  Coloration: Skin is not cyanotic or jaundiced  Neurological:      General: No focal deficit present  Mental Status: He is alert and oriented to person, place, and time  Psychiatric:         Attention and Perception: Attention normal          Speech: Speech normal          Behavior: Behavior is cooperative             LAB RESULTS     Results from last 7 days   Lab Units 06/28/22  0545 06/27/22  0559 06/26/22  0653 06/25/22  0356 06/24/22  0446 06/23/22  1835 06/23/22  0526 06/22/22  1328 06/22/22  0540 06/21/22  2101   WBC Thousand/uL 7 98 7 84  --  8 00 9 69  --  14 21*  --  10 70* 10 24*   HEMOGLOBIN g/dL 11 8* 11 8*  --  12 3 11 4*  --  11 9*  --  13 0 14 0   HEMATOCRIT % 37 0 36 6  --  36 9 35 2*  --  35 3*  --  40 6 42 2   PLATELETS Thousands/uL 212 225  --  190 169  --  193  -- 229 236   POTASSIUM mmol/L 4 2 3 9 3 9 3 4* 3 6 3 5 4 0   < > 4 7 3 9   CHLORIDE mmol/L 106 105 106 104 103 102 104   < > 105 106   CO2 mmol/L 24 26 24 27 28 25 21   < > 15* 24   BUN mg/dL 16 18 21 23 20 20 21   < > 21 17   CREATININE mg/dL 1 46* 1 56* 1 49* 1 46* 1 55* 1 43* 1 58*   < > 1 87* 1 56*   EGFR ml/min/1 73sq m 54 50 53 54 51 56 49   < > 40 50   CALCIUM mg/dL 8 6 9 0 9 2 8 8 8 7 8 7 8 5   < > 8 6 9 2   MAGNESIUM mg/dL  --   --   --  2 3 2 3  --  2 5  --  2 8* 2 4   PHOSPHORUS mg/dL  --   --   --  5 2* 3 7  --  4 2  --  5 1*  --     < > = values in this interval not displayed  RADIOLOGY RESULTS      Results for orders placed during the hospital encounter of 06/21/22    XR chest 1 view portable    Narrative  CHEST    INDICATION:   tachycardia  COMPARISON:  1/28/2015    EXAM PERFORMED/VIEWS:  XR CHEST PORTABLE  Images: 2    FINDINGS:    Cardiomediastinal silhouette appears unremarkable  The lungs are clear  No pneumothorax or pleural effusion  Osseous structures appear within normal limits for patient age  Impression  No acute cardiopulmonary disease  Findings are stable        Workstation performed: [id number]    No results found for this or any previous visit  ASSESSMENT/PLAN     45-year-old male with a PMH Of AF s/p DCCV, HLD, and HTN was admitted with rapid Afib, palpitations, and shortness of breath  Nephrology consulted for management of VASILE         Acute Kidney Injury: POA  Admitted with a creatinine of 1 56  After review of medical records, baseline creatinine appears to be around 1 1 dating back to 2021  Peak creatinine during hospitalization was 1 87  Patient in rapid afib with signs of volume overload  Underwent CTA on 6/22/2022  Cardiac Catheterization done yesterday for ischemic evaluation, pending transfer to [address] [place] [address] for possible arthrectomy  Current creatinine levels 1 46 and improved    Work-up:  - UA: Specific gravity > 1 030, bland  - FeUrea: 46 9% indicating intrinsic   - CT abdomen and pelvis: No hydronephrosis or perinephric collection  Etiology:  Likely intrinsic ATN in the setting of decreased renal perfusion from rapid afib, volume overload, and IV contrast administration  May also be a component of prerenal etiology in the setting of high urine specific gravity on admission  Plan/Recommendations:  - Will stop IV fluids today  - Maintain MAP > 65, avoid hypoperfusion/hypotension  - Monitor I/O and daily weights  - Daily BMP     Hypertension:  Pressure readings acceptable over the past 24 hours on current regimen      HFrEF:  ECHO: EF 35%, moderate global hypokinesis  Appears fairly euvolemic on examination today, received gentle IV fluids  Maintained on furosemide 40mg daily       Atrial Fibrillation:  Currently rate controlled amiodarone, metoprolol, and digoxin  Cardiology following, underwent cardiac catheterization yesterday [facility]  6/28/2022      Portions of the record may have been created with voice recognition software  Occasional wrong word or "sound a like" substitutions may have occurred due to the inherent limitations of voice recognition software  Read the chart carefully and recognize, using context, where substitutions have occurred

## 2022-07-21 DIAGNOSIS — I10 BENIGN ESSENTIAL HTN: ICD-10-CM

## 2022-07-21 RX ORDER — AMLODIPINE BESYLATE 5 MG/1
TABLET ORAL
Qty: 30 TABLET | Refills: 5 | Status: SHIPPED | OUTPATIENT
Start: 2022-07-21

## 2022-07-21 NOTE — TELEPHONE ENCOUNTER
Conrad Copeland   11/21/2022 10:30 AM MD Veronica Rich [id number]            Patient Active Problem List:     GERD (gastroesophageal reflux disease)     Carpal tunnel syndrome of right wrist     Benign essential HTN     Diabetic peripheral neuropathy (HCC)     Mixed hyperlipidemia     Vitamin D deficiency     Leg pain, left     Microalbuminuria     PVD (peripheral vascular disease) (HCC)     Status post partial resection of colon     Cigarette smoker     Left carotid artery stenosis     Pain in upper limb     Situational mixed anxiety and depressive disorder     History of coronary artery bypass graft     H/O heart artery stent     History of DVT (deep vein thrombosis)     Chronic midline low back pain without sciatica

## 2022-07-27 DIAGNOSIS — E78.2 MIXED HYPERLIPIDEMIA: ICD-10-CM

## 2022-07-27 RX ORDER — ROSUVASTATIN CALCIUM 10 MG/1
10 TABLET, COATED ORAL NIGHTLY
Qty: 30 TABLET | Refills: 5 | Status: SHIPPED | OUTPATIENT
Start: 2022-07-27

## 2022-08-04 ENCOUNTER — TELEPHONE (OUTPATIENT)
Dept: FAMILY MEDICINE CLINIC | Age: 52
End: 2022-08-04

## 2022-08-04 DIAGNOSIS — M25.511 ACUTE PAIN OF RIGHT SHOULDER: Primary | ICD-10-CM

## 2022-08-04 RX ORDER — PREDNISONE 20 MG/1
40 TABLET ORAL DAILY
Qty: 10 TABLET | Refills: 0 | Status: SHIPPED | OUTPATIENT
Start: 2022-08-04 | End: 2022-08-09

## 2022-08-04 NOTE — TELEPHONE ENCOUNTER
Pt called stating he is having Rt arm pain. It is very painful, pt is not sleeping due to pain, he did put it in a sling and wrapped it because it hurts to hang. Pt is requesting stronger pain med or Cortisone inj, prednisone or anything for relief. Call back requested. Rx sent to [facility].            Last OV: 5/19/2022    Next scheduled apt: 8/9/2022

## 2022-08-10 DIAGNOSIS — F43.23 SITUATIONAL MIXED ANXIETY AND DEPRESSIVE DISORDER: ICD-10-CM

## 2022-08-10 DIAGNOSIS — Z79.4 TYPE 2 DIABETES MELLITUS WITH DIABETIC POLYNEUROPATHY, WITH LONG-TERM CURRENT USE OF INSULIN (HCC): ICD-10-CM

## 2022-08-10 DIAGNOSIS — K21.9 GASTROESOPHAGEAL REFLUX DISEASE WITHOUT ESOPHAGITIS: ICD-10-CM

## 2022-08-10 DIAGNOSIS — E11.42 TYPE 2 DIABETES MELLITUS WITH DIABETIC POLYNEUROPATHY, WITH LONG-TERM CURRENT USE OF INSULIN (HCC): ICD-10-CM

## 2022-08-11 RX ORDER — PANTOPRAZOLE SODIUM 40 MG/1
TABLET, DELAYED RELEASE ORAL
Qty: 60 TABLET | Refills: 5 | Status: SHIPPED | OUTPATIENT
Start: 2022-08-11

## 2022-08-11 RX ORDER — VENLAFAXINE HYDROCHLORIDE 75 MG/1
75 CAPSULE, EXTENDED RELEASE ORAL DAILY
Qty: 30 CAPSULE | Refills: 5 | Status: SHIPPED | OUTPATIENT
Start: 2022-08-11

## 2022-08-22 DIAGNOSIS — E11.65 UNCONTROLLED TYPE 2 DIABETES MELLITUS WITH HYPERGLYCEMIA (HCC): ICD-10-CM

## 2022-08-23 RX ORDER — INSULIN DETEMIR 100 [IU]/ML
50 INJECTION, SOLUTION SUBCUTANEOUS 2 TIMES DAILY
Qty: 15 PEN | Refills: 3 | Status: SHIPPED | OUTPATIENT
Start: 2022-08-23

## 2022-08-24 DIAGNOSIS — G89.29 CHRONIC MIDLINE LOW BACK PAIN WITHOUT SCIATICA: ICD-10-CM

## 2022-08-24 DIAGNOSIS — M54.50 CHRONIC MIDLINE LOW BACK PAIN WITHOUT SCIATICA: ICD-10-CM

## 2022-08-25 RX ORDER — CYCLOBENZAPRINE HCL 10 MG
10 TABLET ORAL NIGHTLY PRN
Qty: 90 TABLET | Refills: 1 | Status: SHIPPED | OUTPATIENT
Start: 2022-08-25 | End: 2022-11-23

## 2022-09-22 ENCOUNTER — TELEPHONE (OUTPATIENT)
Dept: FAMILY MEDICINE CLINIC | Age: 52
End: 2022-09-22

## 2022-09-22 DIAGNOSIS — B96.89 ACUTE BACTERIAL SINUSITIS: Primary | ICD-10-CM

## 2022-09-22 DIAGNOSIS — J01.90 ACUTE BACTERIAL SINUSITIS: Primary | ICD-10-CM

## 2022-09-22 RX ORDER — AZITHROMYCIN 250 MG/1
TABLET, FILM COATED ORAL
Qty: 1 PACKET | Refills: 0 | Status: SHIPPED | OUTPATIENT
Start: 2022-09-22 | End: 2022-10-02

## 2022-09-22 NOTE — TELEPHONE ENCOUNTER
Pt states he has a sore throat, stuffy runny nose and some diarrhea. He is asking for a zpak. DM- [place]. I offered him to come in tomorrow but pt states hes coming in next week for his check up.

## 2022-09-23 ENCOUNTER — OFFICE VISIT (OUTPATIENT)
Dept: FAMILY MEDICINE CLINIC | Age: 52
End: 2022-09-23
Payer: MEDICARE

## 2022-09-23 VITALS
SYSTOLIC BLOOD PRESSURE: 136 MMHG | WEIGHT: 182 LBS | HEIGHT: 69 IN | DIASTOLIC BLOOD PRESSURE: 86 MMHG | BODY MASS INDEX: 26.96 KG/M2 | TEMPERATURE: 97.5 F

## 2022-09-23 DIAGNOSIS — H65.01 NON-RECURRENT ACUTE SEROUS OTITIS MEDIA OF RIGHT EAR: Primary | ICD-10-CM

## 2022-09-23 PROCEDURE — 99213 OFFICE O/P EST LOW 20 MIN: CPT | Performed by: INTERNAL MEDICINE

## 2022-09-23 SDOH — ECONOMIC STABILITY: FOOD INSECURITY: WITHIN THE PAST 12 MONTHS, YOU WORRIED THAT YOUR FOOD WOULD RUN OUT BEFORE YOU GOT MONEY TO BUY MORE.: NEVER TRUE

## 2022-09-23 SDOH — ECONOMIC STABILITY: FOOD INSECURITY: WITHIN THE PAST 12 MONTHS, THE FOOD YOU BOUGHT JUST DIDN'T LAST AND YOU DIDN'T HAVE MONEY TO GET MORE.: NEVER TRUE

## 2022-09-23 ASSESSMENT — ENCOUNTER SYMPTOMS
ABDOMINAL PAIN: 0
BLOOD IN STOOL: 0
NAUSEA: 0
COUGH: 0
RESPIRATORY NEGATIVE: 1
SORE THROAT: 1
SHORTNESS OF BREATH: 0
DIARRHEA: 0
CONSTIPATION: 0

## 2022-09-23 ASSESSMENT — SOCIAL DETERMINANTS OF HEALTH (SDOH): HOW HARD IS IT FOR YOU TO PAY FOR THE VERY BASICS LIKE FOOD, HOUSING, MEDICAL CARE, AND HEATING?: NOT HARD AT ALL

## 2022-09-23 NOTE — PATIENT INSTRUCTIONS
1. Non-recurrent acute serous otitis media of right ear  Recommend finishing out the course of Zithromax as this should work well. Yessi Laws is instructed to return to the clinic if the symptoms continue or worsen. Percyalejandra Laws  was also instructed to go to the emergency room department if the symptoms significantly worsen before an appointment can be made.

## 2022-09-23 NOTE — PROGRESS NOTES
Mary Jane Dsouza (:  1970) is a 47 y.o. male,Established patient, here for evaluation of the following chief complaint(s):  Mouth Lesions (Complains of sx for last few days. Grandkids are sick also. 2 negative COVID tests. Using nasal spray (flonase)), Pharyngitis, Otalgia, and Diarrhea         ASSESSMENT/PLAN:  1. Non-recurrent acute serous otitis media of right ear      Plan:  1. Non-recurrent acute serous otitis media of right ear  Recommend finishing out the course of Zithromax as this should work well. Elias Duncan is instructed to return to the clinic if the symptoms continue or worsen. Elias Duncan  was also instructed to go to the emergency room department if the symptoms significantly worsen before an appointment can be made. Subjective   SUBJECTIVE/OBJECTIVE:  Elias Duncan states he has not been feeling well. He states his grand kids are also sick. Symptom have been for 4 days. Sore throat with right side ear ache. He has noticed sores in his mouth. He has noticed his tongue is red. No fever not but did a couple days ago. No cough or sputum production. COVID negative x 2. Review of Systems   Constitutional:  Positive for fever. HENT:  Positive for congestion, ear pain and sore throat. Negative for postnasal drip and sneezing. Eyes:  Negative for visual disturbance. Respiratory: Negative. Negative for cough and shortness of breath. Cardiovascular:  Negative for chest pain, palpitations and leg swelling. Gastrointestinal:  Negative for abdominal pain, blood in stool, constipation, diarrhea and nausea. Genitourinary:  Negative for difficulty urinating, dysuria, frequency and urgency. Musculoskeletal:  Negative for arthralgias, joint swelling, myalgias, neck pain and neck stiffness. Skin: Negative. Neurological:  Negative for syncope. Psychiatric/Behavioral: Negative. Objective   Physical Exam  Vitals and nursing note reviewed.    Constitutional: Appearance: He is well-developed. HENT:      Head: Atraumatic. Eyes:      Conjunctiva/sclera: Conjunctivae normal.   Cardiovascular:      Rate and Rhythm: Normal rate and regular rhythm. Heart sounds: Normal heart sounds. Pulmonary:      Effort: Pulmonary effort is normal.      Breath sounds: Normal breath sounds. Abdominal:      Palpations: Abdomen is soft. Tenderness: There is no abdominal tenderness. Musculoskeletal:      Cervical back: Normal range of motion and neck supple. Lymphadenopathy:      Cervical: No cervical adenopathy. Skin:     Findings: No rash. Neurological:      Mental Status: He is alert. Psychiatric:         Behavior: Behavior normal.         Thought Content: Thought content normal.                An electronic signature was used to authenticate this note.     --Ivett Em MD

## 2022-09-28 DIAGNOSIS — G89.29 CHRONIC MIDLINE LOW BACK PAIN WITHOUT SCIATICA: ICD-10-CM

## 2022-09-28 DIAGNOSIS — I10 BENIGN ESSENTIAL HTN: ICD-10-CM

## 2022-09-28 DIAGNOSIS — E11.42 DIABETIC PERIPHERAL NEUROPATHY (HCC): ICD-10-CM

## 2022-09-28 DIAGNOSIS — M54.50 CHRONIC MIDLINE LOW BACK PAIN WITHOUT SCIATICA: ICD-10-CM

## 2022-09-28 RX ORDER — METOPROLOL TARTRATE 50 MG/1
TABLET, FILM COATED ORAL
Qty: 60 TABLET | Refills: 5 | Status: SHIPPED | OUTPATIENT
Start: 2022-09-28

## 2022-09-28 RX ORDER — GABAPENTIN 300 MG/1
CAPSULE ORAL
Qty: 90 CAPSULE | Refills: 3 | Status: SHIPPED | OUTPATIENT
Start: 2022-09-28 | End: 2023-03-27

## 2022-10-07 ENCOUNTER — TELEPHONE (OUTPATIENT)
Dept: FAMILY MEDICINE CLINIC | Age: 52
End: 2022-10-07

## 2022-10-07 ENCOUNTER — OFFICE VISIT (OUTPATIENT)
Dept: FAMILY MEDICINE CLINIC | Age: 52
End: 2022-10-07
Payer: MEDICARE

## 2022-10-07 VITALS
SYSTOLIC BLOOD PRESSURE: 122 MMHG | DIASTOLIC BLOOD PRESSURE: 82 MMHG | BODY MASS INDEX: 26.66 KG/M2 | WEIGHT: 180 LBS | HEART RATE: 92 BPM | HEIGHT: 69 IN

## 2022-10-07 DIAGNOSIS — J40 BRONCHITIS: ICD-10-CM

## 2022-10-07 DIAGNOSIS — Z20.822 SUSPECTED COVID-19 VIRUS INFECTION: Primary | ICD-10-CM

## 2022-10-07 PROCEDURE — 99213 OFFICE O/P EST LOW 20 MIN: CPT | Performed by: INTERNAL MEDICINE

## 2022-10-07 RX ORDER — AZITHROMYCIN 250 MG/1
TABLET, FILM COATED ORAL
Qty: 1 PACKET | Refills: 0 | Status: SHIPPED | OUTPATIENT
Start: 2022-10-07 | End: 2022-10-17

## 2022-10-07 ASSESSMENT — ENCOUNTER SYMPTOMS
RESPIRATORY NEGATIVE: 1
ABDOMINAL PAIN: 0
CONSTIPATION: 0
SORE THROAT: 1
BLOOD IN STOOL: 0
NAUSEA: 0
DIARRHEA: 0

## 2022-10-07 NOTE — PROGRESS NOTES
Wesley Santana (:  1970) is a 47 y.o. male,Established patient, here for evaluation of the following chief complaint(s):  Pharyngitis (Complains of not feeling well for 2 days. Took home covid test-was negative), Cough, Fatigue, Otalgia, Emesis, and Headache         ASSESSMENT/PLAN:  1. Suspected COVID-19 virus infection  2. Bronchitis  -     azithromycin (ZITHROMAX Z-VIVEK) 250 MG tablet; Two tablets by mouth the first day  then one tablet daily for 4 days. , Disp-1 packet, R-0Normal      Plan:  1. Suspected COVID-19 virus infection  Preston was instructed to retest as my suspicion is high with his current symptoms. If positive I did instruct ariana to take Zinc 50 mg daily, Vitamin C 2000 mg daily, and Vitamin D 2000 units daily. 2. Bronchitis  If COVID is negative I will treat with Carl Ocasio since we are going into the weekend. - azithromycin (ZITHROMAX Z-VIVEK) 250 MG tablet; Two tablets by mouth the first day  then one tablet daily for 4 days. Dispense: 1 packet; Refill: 0    Preston is instructed to return to the clinic if the symptoms continue or worsen. Idalia Yang  was also instructed to go to the emergency room department if the symptoms significantly worsen before an appointment can be made. Subjective   SUBJECTIVE/OBJECTIVE:  Cough  This is a new (Home COVID test negative.) problem. The current episode started in the past 7 days. The problem has been rapidly worsening. The problem occurs constantly. The cough is Non-productive. Associated symptoms include chills, ear pain, a fever, myalgias and a sore throat. Pertinent negatives include no chest pain or postnasal drip. Associated symptoms comments: Headache  . Nothing aggravates the symptoms. Treatments tried: Tylenol. The treatment provided no relief. There is no history of COPD. Review of Systems   Constitutional:  Positive for chills and fever. HENT:  Positive for ear pain and sore throat.  Negative for congestion, postnasal drip and sneezing. Eyes:  Negative for visual disturbance. Respiratory: Negative. Cardiovascular:  Negative for chest pain, palpitations and leg swelling. Gastrointestinal:  Negative for abdominal pain, blood in stool, constipation, diarrhea and nausea. Genitourinary:  Negative for difficulty urinating, dysuria, frequency and urgency. Musculoskeletal:  Positive for myalgias. Negative for arthralgias, joint swelling, neck pain and neck stiffness. Skin: Negative. Neurological:  Negative for syncope. Psychiatric/Behavioral: Negative. Objective   Physical Exam  Vitals and nursing note reviewed. Constitutional:       Appearance: He is well-developed. HENT:      Head: Atraumatic. Nose: Congestion present. Eyes:      Conjunctiva/sclera: Conjunctivae normal.   Cardiovascular:      Rate and Rhythm: Normal rate and regular rhythm. Heart sounds: Normal heart sounds. Pulmonary:      Effort: Pulmonary effort is normal.      Breath sounds: Normal breath sounds. Abdominal:      Palpations: Abdomen is soft. Tenderness: There is no abdominal tenderness. Musculoskeletal:      Cervical back: Normal range of motion and neck supple. Lymphadenopathy:      Cervical: No cervical adenopathy. Skin:     Findings: No rash. Neurological:      Mental Status: He is alert. Psychiatric:         Behavior: Behavior normal.         Thought Content: Thought content normal.                An electronic signature was used to authenticate this note.     --Elmira Soliz MD

## 2022-10-07 NOTE — PATIENT INSTRUCTIONS
Survey: You may be receiving a survey from [employer] regarding your visit today. You may get this in the mail, through your MyChart or in your email. Please complete the survey to enable us to provide the highest quality of care to you and your family. Please also, mention our names. If you cannot score us as very good (5 Stars) on any question, please feel free to call the office to discuss how we could have made your experience exceptional.      Thank You! MD Neetu Abreu, [address] [place] [address], ELIZA PalmerN ADDISON Paul       Plan:  1. Suspected COVID-19 virus infection  Preston was instructed to retest as my suspicion is high with his current symptoms. If positive I did instruct hit to take Zinc 50 mg daily, Vitamin C 2000 mg daily, and Vitamin D 2000 units daily. 2. Bronchitis  If COVID is negative I will treat with Dutch Toledo since we are going into the weekend. - azithromycin (ZITHROMAX Z-VIVEK) 250 MG tablet; Two tablets by mouth the first day  then one tablet daily for 4 days. Dispense: 1 packet; Refill: 0    Preston is instructed to return to the clinic if the symptoms continue or worsen. Juan Eliualberto  was also instructed to go to the emergency room department if the symptoms significantly worsen before an appointment can be made.

## 2022-10-07 NOTE — TELEPHONE ENCOUNTER
I called patient no answer and unable to leave voice mail.      Last OV: 9/23/2022    Next scheduled apt: 10/21/2022

## 2022-10-07 NOTE — TELEPHONE ENCOUNTER
----- Message from Cornelio Cloud sent at 10/6/2022  4:38 PM EDT -----  Subject: Appointment Request    Reason for Call: Established Patient Appointment needed: Urgent Cough Cold    QUESTIONS    Reason for appointment request? No appointments available during search     Additional Information for Provider?  Maine De La Rosa called on 10/06 @ 4:36   pm. He needs an appointment by tomorrow (no time preference, but it needs   to be tomorrow) for his flu-like symptoms (fever, chills, body-aches)   Please call him back to schedule him an appointment.  ---------------------------------------------------------------------------  --------------  [id number]  [phone]; OK to leave message on voicemail  ---------------------------------------------------------------------------  --------------  SCRIPT ANSWERS  COVID Screen: Red

## 2022-10-27 DIAGNOSIS — F41.1 GENERALIZED ANXIETY DISORDER: ICD-10-CM

## 2022-10-28 RX ORDER — HYDROXYZINE HYDROCHLORIDE 25 MG/1
TABLET, FILM COATED ORAL
Qty: 90 TABLET | Refills: 5 | Status: SHIPPED | OUTPATIENT
Start: 2022-10-28

## 2022-10-28 RX ORDER — CILOSTAZOL 100 MG/1
TABLET ORAL
Qty: 60 TABLET | Refills: 5 | Status: SHIPPED | OUTPATIENT
Start: 2022-10-28

## 2022-10-28 NOTE — TELEPHONE ENCOUNTER
Health Maintenance   Topic Date Due    Hepatitis C screen  Never done    Hepatitis B vaccine (1 of 3 - Risk 3-dose series) Never done    Colorectal Cancer Screen  Never done    Shingles vaccine (1 of 2) Never done    Diabetic microalbuminuria test  10/09/2021    COVID-19 Vaccine (5 - Booster for Moderna series) 06/16/2022    A1C test (Diabetic or Prediabetic)  07/29/2022    Lipids  10/25/2022    Annual Wellness Visit (AWV)  11/19/2022    DTaP/Tdap/Td vaccine (2 - Td or Tdap) 11/19/2022    Diabetic retinal exam  04/25/2023    Diabetic foot exam  04/29/2023    Pneumococcal 0-64 years Vaccine (2 - PCV) 04/29/2023    Depression Monitoring  05/19/2023    Flu vaccine  Completed    HIV screen  Completed    Hepatitis A vaccine  Aged Out    Hib vaccine  Aged Out    Meningococcal (ACWY) vaccine  Aged Out             (applicable per patient's age: Cancer Screenings, Depression Screening, Fall Risk Screening, Immunizations)    Hemoglobin A1C (%)   Date Value   04/29/2022 10.2   10/25/2021 11.5   04/09/2021 9.7     Microalb/Crt.  Ratio (mcg/mg creat)   Date Value   10/09/2020 24 (H)     LDL Cholesterol (mg/dL)   Date Value   10/09/2020 85     LDL Calculated (mg/dL)   Date Value   10/25/2021 53     AST (U/L)   Date Value   10/09/2020 47 (H)     ALT (U/L)   Date Value   10/09/2020 74 (H)     BUN (mg/dL)   Date Value   10/09/2020 17      (goal A1C is < 7)   (goal LDL is <100) need 30-50% reduction from baseline     BP Readings from Last 3 Encounters:   10/07/22 122/82   09/23/22 136/86   05/19/22 118/70    (goal /80)      All Future Testing planned in [facility]:  Lab Frequency Next Occurrence   Comprehensive Metabolic Panel Once 67/31/6311   Lipid Panel Once 10/29/2022   Microalbumin / Creatinine Urine Ratio Once 10/29/2022   PSA Screening Once 10/29/2022   Hepatitis C Antibody Once 04/29/2022   Hemoglobin A1C Once 07/29/2022       Next Visit Date:  Future Appointments   Date Time Provider Dwaine Pal   11/21/2022 10:30 AM MD Richi Ricks [facility]            Patient Active Problem List:     GERD (gastroesophageal reflux disease)     Carpal tunnel syndrome of right wrist     Benign essential HTN     Diabetic peripheral neuropathy (HCC)     Mixed hyperlipidemia     Vitamin D deficiency     Leg pain, left     Microalbuminuria     PVD (peripheral vascular disease) (HCC)     Status post partial resection of colon     Cigarette smoker     Left carotid artery stenosis     Pain in upper limb     Situational mixed anxiety and depressive disorder     History of coronary artery bypass graft     H/O heart artery stent     History of DVT (deep vein thrombosis)     Chronic midline low back pain without sciatica

## 2022-11-17 ENCOUNTER — TELEPHONE (OUTPATIENT)
Dept: FAMILY MEDICINE CLINIC | Age: 52
End: 2022-11-17

## 2022-11-17 DIAGNOSIS — J01.90 ACUTE NON-RECURRENT SINUSITIS, UNSPECIFIED LOCATION: Primary | ICD-10-CM

## 2022-11-17 RX ORDER — AZITHROMYCIN 250 MG/1
TABLET, FILM COATED ORAL
Qty: 1 PACKET | Refills: 0 | Status: SHIPPED | OUTPATIENT
Start: 2022-11-17 | End: 2022-11-22 | Stop reason: ALTCHOICE

## 2022-11-22 ENCOUNTER — OFFICE VISIT (OUTPATIENT)
Dept: FAMILY MEDICINE CLINIC | Age: 52
End: 2022-11-22
Payer: MEDICARE

## 2022-11-22 VITALS
DIASTOLIC BLOOD PRESSURE: 88 MMHG | BODY MASS INDEX: 27.4 KG/M2 | WEIGHT: 185 LBS | HEIGHT: 69 IN | SYSTOLIC BLOOD PRESSURE: 137 MMHG | HEART RATE: 101 BPM

## 2022-11-22 DIAGNOSIS — Z00.00 MEDICARE ANNUAL WELLNESS VISIT, SUBSEQUENT: Primary | ICD-10-CM

## 2022-11-22 PROCEDURE — G0439 PPPS, SUBSEQ VISIT: HCPCS | Performed by: INTERNAL MEDICINE

## 2022-11-22 PROCEDURE — 3078F DIAST BP <80 MM HG: CPT | Performed by: INTERNAL MEDICINE

## 2022-11-22 PROCEDURE — 3074F SYST BP LT 130 MM HG: CPT | Performed by: INTERNAL MEDICINE

## 2022-11-22 ASSESSMENT — PATIENT HEALTH QUESTIONNAIRE - PHQ9
6. FEELING BAD ABOUT YOURSELF - OR THAT YOU ARE A FAILURE OR HAVE LET YOURSELF OR YOUR FAMILY DOWN: 0
3. TROUBLE FALLING OR STAYING ASLEEP: 0
SUM OF ALL RESPONSES TO PHQ QUESTIONS 1-9: 0
9. THOUGHTS THAT YOU WOULD BE BETTER OFF DEAD, OR OF HURTING YOURSELF: 0
8. MOVING OR SPEAKING SO SLOWLY THAT OTHER PEOPLE COULD HAVE NOTICED. OR THE OPPOSITE, BEING SO FIGETY OR RESTLESS THAT YOU HAVE BEEN MOVING AROUND A LOT MORE THAN USUAL: 0
1. LITTLE INTEREST OR PLEASURE IN DOING THINGS: 0
5. POOR APPETITE OR OVEREATING: 0
SUM OF ALL RESPONSES TO PHQ9 QUESTIONS 1 & 2: 0
7. TROUBLE CONCENTRATING ON THINGS, SUCH AS READING THE NEWSPAPER OR WATCHING TELEVISION: 0
SUM OF ALL RESPONSES TO PHQ QUESTIONS 1-9: 0
SUM OF ALL RESPONSES TO PHQ QUESTIONS 1-9: 0
10. IF YOU CHECKED OFF ANY PROBLEMS, HOW DIFFICULT HAVE THESE PROBLEMS MADE IT FOR YOU TO DO YOUR WORK, TAKE CARE OF THINGS AT HOME, OR GET ALONG WITH OTHER PEOPLE: 0
SUM OF ALL RESPONSES TO PHQ QUESTIONS 1-9: 0
4. FEELING TIRED OR HAVING LITTLE ENERGY: 0
2. FEELING DOWN, DEPRESSED OR HOPELESS: 0

## 2022-11-22 ASSESSMENT — LIFESTYLE VARIABLES
HOW MANY STANDARD DRINKS CONTAINING ALCOHOL DO YOU HAVE ON A TYPICAL DAY: PATIENT DOES NOT DRINK
HOW OFTEN DO YOU HAVE A DRINK CONTAINING ALCOHOL: NEVER

## 2022-11-22 NOTE — PATIENT INSTRUCTIONS
Survey: You may be receiving a survey from [employer] regarding your visit today. You may get this in the mail, through your MyChart or in your email. Please complete the survey to enable us to provide the highest quality of care to you and your family. Please also, mention our names. If you cannot score us as very good (5 Stars) on any question, please feel free to call the office to discuss how we could have made your experience exceptional.      Thank You! MD Quita Byrnes, Juan Covington, ELIZAN ADDISON Lobo     Personalized Preventive Plan for [facility] - 11/22/2022  Medicare offers a range of preventive health benefits. Some of the tests and screenings are paid in full while other may be subject to a deductible, co-insurance, and/or copay. Some of these benefits include a comprehensive review of your medical history including lifestyle, illnesses that may run in your family, and various assessments and screenings as appropriate. After reviewing your medical record and screening and assessments performed today your provider may have ordered immunizations, labs, imaging, and/or referrals for you. A list of these orders (if applicable) as well as your Preventive Care list are included within your After Visit Summary for your review. Other Preventive Recommendations:    A preventive eye exam performed by an eye specialist is recommended every 1-2 years to screen for glaucoma; cataracts, macular degeneration, and other eye disorders. A preventive dental visit is recommended every 6 months. Try to get at least 150 minutes of exercise per week or 10,000 steps per day on a pedometer . Order or download the FREE \"Exercise & Physical Activity: Your Everyday Guide\" from [employer]. Call 0-641.281.1630 or search [employer] online. You need 7392-5786 mg of calcium and 2786-5526 IU of vitamin D per day.  It is possible to meet your calcium requirement with diet alone, but a vitamin D supplement is usually necessary to meet this goal.  When exposed to the sun, use a sunscreen that protects against both UVA and UVB radiation with an SPF of 30 or greater. Reapply every 2 to 3 hours or after sweating, drying off with a towel, or swimming. Always wear a seat belt when traveling in a car. Always wear a helmet when riding a bicycle or motorcycle.

## 2022-11-22 NOTE — PROGRESS NOTES
Medicare Annual Wellness Visit    Sofya Damon is here for Medicare AWV    Assessment & Plan   Medicare annual wellness visit, subsequent    Recommendations for Preventive Services Due: see orders and patient instructions/AVS.  Recommended screening schedule for the next 5-10 years is provided to the patient in written form: see Patient Instructions/AVS.     Return in about 1 year (around 11/22/2023) for AWV. 1. Medicare annual wellness visit, subsequent  See concerns and discussion below     This encounter was performed under myJohn, Carie, direct supervision, 11/22/2022. Advance Care Planning     General Advance Care Planning (ACP) Conversation    Date of Conversation: 11/22/2022  Conducted with: Patient with Decision Making Capacity    Healthcare Decision Maker:    Primary Decision Maker: Bri Yen - [facility] - [phone]  Click here to complete Healthcare Decision Makers including selection of the Healthcare Decision Maker Relationship (ie \"Primary\"). Today we documented Decision Maker(s) consistent with Legal Next of Kin hierarchy. Content/Action Overview:  No ACP docs, declines ACP referral   Reviewed DNR/DNI and patient elects Full Code (Attempt Resuscitation)  artificial nutrition, ventilation preferences, hospitalization preferences, and resuscitation preferences  Full code, no new order needed    Length of Voluntary ACP Conversation in minutes:  <16 minutes (Non-Billable)    Jud Morales RN               Subjective       Patient's complete Health Risk Assessment and screening values have been reviewed and are found in [facility]. The following problems were reviewed today and where indicated follow up appointments were made and/or referrals ordered.     Positive Risk Factor Screenings with Interventions:    Fall Risk:  Do you feel unsteady or are you worried about falling? : no  2 or more falls in past year?: (!) yes  Fall with injury in past year?: (!) yes   Fall Risk Interventions:    Home safety tips provided      Tobacco Use:  Tobacco Use: High Risk    Smoking Tobacco Use: Some Days    Smokeless Tobacco Use: Never    Passive Exposure: Not on file     E-cigarette/Vaping       Questions Responses    E-cigarette/Vaping Use     Start Date     Passive Exposure     Quit Date     Counseling Given     Comments           Substance Use - Tobacco Interventions:  Patient states he is smoking less, 5 cigarettes per day. General Health and ACP:  General  In general, how would you say your health is?: Fair  In the past 7 days, have you experienced any of the following: New or Increased Pain, New or Increased Fatigue, Loneliness, Social Isolation, Stress or Anger?: No  Do you get the social and emotional support that you need?: Yes  Do you have a Living Will?: (!) No    Advance Directives       Power of [address] [place] [address] Will ACP-Advance Directive ACP-Power of     Not on File Not on File Not on File Not on File          General Health Risk Interventions:  No concerns at this time     Hearing/Vision:  Do you or your family notice any trouble with your hearing that hasn't been managed with hearing aids?: No  Do you have difficulty driving, watching TV, or doing any of your daily activities because of your eyesight?: (!) Yes  Have you had an eye exam within the past year?: Yes  No results found. Hearing/Vision Interventions:  Vision concerns:  Currently following with eye doctor, aware of concerns            Objective   Vitals:    11/22/22 1400   BP: 137/88   Site: Right Upper Arm   Position: Sitting   Cuff Size: Medium Adult   Pulse: (!) 101   Weight: 185 lb (83.9 kg)   Height: 5' 9\" (1.753 m)      Body mass index is 27.32 kg/m². Allergies   Allergen Reactions    Oxycodone-Acetaminophen      Other reaction(s): Mental Status Change     Prior to Visit Medications    Medication Sig Taking?  Authorizing Provider   cilostazol (PLETAL) 100 MG tablet TAKE 1 TABLET BY MOUTH TWICE DAILY Yes John Mathias MD   hydrOXYzine HCl (ATARAX) 25 MG tablet TAKE 1 TABLET BY MOUTH THREE TIMES DAILY AS NEEDED FOR ANXIETY Yes John Mathias MD   metoprolol tartrate (LOPRESSOR) 50 MG tablet TAKE 1 TABLET BY MOUTH TWICE DAILY Yes John Mathias MD   gabapentin (NEURONTIN) 300 MG capsule TAKE 1 CAPSULE BY MOUTH THREE TIMES DAILY Yes John Mathias MD   cyclobenzaprine (FLEXERIL) 10 MG tablet TAKE 1 TABLET BY MOUTH NIGHTLY AS NEEDED FOR MUSCLE SPASMS Yes John Mathias MD   insulin detemir (LEVEMIR FLEXTOUCH) 100 UNIT/ML injection pen Inject 50 Units into the skin 2 times daily Yes John Mathias MD   metFORMIN (GLUCOPHAGE) 1000 MG tablet TAKE 1 TABLET BY MOUTH TWICE DAILY WITH MEALS Yes John Mathias MD   pantoprazole (PROTONIX) 40 MG tablet TAKE 2 TABLETS BY MOUTH EVERY DAY Yes John Mathias MD   venlafaxine (EFFEXOR XR) 75 MG extended release capsule Take 1 capsule by mouth daily Yes John Mathias MD   rosuvastatin (CRESTOR) 10 MG tablet Take 1 tablet by mouth nightly Yes Irena Skinner MD   amLODIPine (NORVASC) 5 MG tablet TAKE 1 TABLET BY MOUTH EVERY DAY Yes John Mathias MD   Magnesium 400 MG TABS Take 400 mg by mouth daily Yes John Mathias MD   insulin lispro, 1 Unit Dial, (HUMALOG KWIKPEN) 100 UNIT/ML SOPN Inject 3 Units into the skin 3 times daily (before meals) Yes John Mathias MD   losartan (COZAAR) 100 MG tablet Take 1 tablet by mouth daily Yes John Mathias MD   Insulin Pen Needle 31G X 5 MM MISC 1 each by Does not apply route daily Yes John Mathias MD   Cholecalciferol (VITAMIN D) 50 MCG (2000 UT) CAPS capsule Take by mouth Yes Historical Provider, MD   [facility] Unilet Lancets 28G MISC Test TID and prn.  DX E11.9, Z79.4 Yes John Mathias MD   blood glucose monitor strips 1 strip by Other route 3 times daily as needed for Other (as needed) Dx E11.9, Z79.4 [facility] Unilet Test strips Yes John Mathias MD   Insulin Syringe-Needle U-100 30G X 1/2\" 0.5 ML MISC Dx E11.9, Z79.4 injecting insulin QD Yes Chelsea Mathias MD magnesium oxide (MAG-OX) 400 (241.3 Mg) MG TABS tablet TAKE 1 TABLET BY MOUTH DAILY Yes Historical Provider, MD   loperamide (IMODIUM) 2 MG capsule Take 2 mg by mouth 4 times daily as needed for Diarrhea Yes Historical Provider, MD   TRUE METRIX BLOOD GLUCOSE TEST strip Test BS TID and prn. Yes John Mathias MD   aspirin 81 MG EC tablet Take 81 mg by mouth daily. Yes Historical Provider, MD Pierre (Including outside providers/suppliers regularly involved in providing care):   Patient Care Team:  Benedetto Lombard, MD as PCP - General (Internal Medicine)  Benedetto Lombard, MD as PCP - [address] [place] [address] [facility]  Pedro Luis Hawkins MD (Vascular Surgery)  Willy Bamberger, MD as Consulting Physician (Nephrology)     Reviewed and updated this visit:  Tobacco  Allergies  Meds  Med Hx  Surg Hx  Soc Hx  Fam Hx            I, Chris Hurd RN, 11/22/2022, performed the documented evaluation under the direct supervision of the attending physician.

## 2022-12-27 DIAGNOSIS — E11.65 UNCONTROLLED TYPE 2 DIABETES MELLITUS WITH HYPERGLYCEMIA (HCC): ICD-10-CM

## 2022-12-27 RX ORDER — INSULIN DETEMIR 100 [IU]/ML
60 INJECTION, SOLUTION SUBCUTANEOUS 2 TIMES DAILY
Qty: 12 ADJUSTABLE DOSE PRE-FILLED PEN SYRINGE | Refills: 5 | Status: SHIPPED | OUTPATIENT
Start: 2022-12-27

## 2023-01-13 ENCOUNTER — OFFICE VISIT (OUTPATIENT)
Dept: FAMILY MEDICINE CLINIC | Age: 53
End: 2023-01-13
Payer: MEDICARE

## 2023-01-13 VITALS
DIASTOLIC BLOOD PRESSURE: 88 MMHG | SYSTOLIC BLOOD PRESSURE: 136 MMHG | WEIGHT: 190 LBS | BODY MASS INDEX: 28.79 KG/M2 | HEIGHT: 68 IN | HEART RATE: 102 BPM

## 2023-01-13 DIAGNOSIS — E78.2 MIXED HYPERLIPIDEMIA: ICD-10-CM

## 2023-01-13 DIAGNOSIS — K21.9 GASTROESOPHAGEAL REFLUX DISEASE WITHOUT ESOPHAGITIS: ICD-10-CM

## 2023-01-13 DIAGNOSIS — E55.9 VITAMIN D DEFICIENCY: ICD-10-CM

## 2023-01-13 DIAGNOSIS — R80.9 MICROALBUMINURIA: ICD-10-CM

## 2023-01-13 DIAGNOSIS — G89.29 CHRONIC PAIN OF RIGHT LOWER EXTREMITY: ICD-10-CM

## 2023-01-13 DIAGNOSIS — M79.604 CHRONIC PAIN OF RIGHT LOWER EXTREMITY: ICD-10-CM

## 2023-01-13 DIAGNOSIS — E11.00 DM HYPEROSMOLARITY TYPE II, UNCONTROLLED (HCC): ICD-10-CM

## 2023-01-13 DIAGNOSIS — I10 BENIGN ESSENTIAL HTN: Primary | ICD-10-CM

## 2023-01-13 DIAGNOSIS — Z12.5 PROSTATE CANCER SCREENING: ICD-10-CM

## 2023-01-13 DIAGNOSIS — F17.210 CIGARETTE SMOKER: ICD-10-CM

## 2023-01-13 DIAGNOSIS — E11.65 DM HYPEROSMOLARITY TYPE II, UNCONTROLLED (HCC): ICD-10-CM

## 2023-01-13 PROCEDURE — 99214 OFFICE O/P EST MOD 30 MIN: CPT | Performed by: INTERNAL MEDICINE

## 2023-01-13 PROCEDURE — 3079F DIAST BP 80-89 MM HG: CPT | Performed by: INTERNAL MEDICINE

## 2023-01-13 PROCEDURE — 3075F SYST BP GE 130 - 139MM HG: CPT | Performed by: INTERNAL MEDICINE

## 2023-01-13 ASSESSMENT — ENCOUNTER SYMPTOMS
CONSTIPATION: 0
BLOOD IN STOOL: 0
NAUSEA: 0
SORE THROAT: 0
ABDOMINAL PAIN: 0
DIARRHEA: 0
RESPIRATORY NEGATIVE: 1

## 2023-01-13 ASSESSMENT — PATIENT HEALTH QUESTIONNAIRE - PHQ9
5. POOR APPETITE OR OVEREATING: 0
7. TROUBLE CONCENTRATING ON THINGS, SUCH AS READING THE NEWSPAPER OR WATCHING TELEVISION: 0
10. IF YOU CHECKED OFF ANY PROBLEMS, HOW DIFFICULT HAVE THESE PROBLEMS MADE IT FOR YOU TO DO YOUR WORK, TAKE CARE OF THINGS AT HOME, OR GET ALONG WITH OTHER PEOPLE: 0
SUM OF ALL RESPONSES TO PHQ QUESTIONS 1-9: 0
1. LITTLE INTEREST OR PLEASURE IN DOING THINGS: 0
SUM OF ALL RESPONSES TO PHQ9 QUESTIONS 1 & 2: 0
4. FEELING TIRED OR HAVING LITTLE ENERGY: 0
9. THOUGHTS THAT YOU WOULD BE BETTER OFF DEAD, OR OF HURTING YOURSELF: 0
6. FEELING BAD ABOUT YOURSELF - OR THAT YOU ARE A FAILURE OR HAVE LET YOURSELF OR YOUR FAMILY DOWN: 0
2. FEELING DOWN, DEPRESSED OR HOPELESS: 0
3. TROUBLE FALLING OR STAYING ASLEEP: 0
SUM OF ALL RESPONSES TO PHQ QUESTIONS 1-9: 0
8. MOVING OR SPEAKING SO SLOWLY THAT OTHER PEOPLE COULD HAVE NOTICED. OR THE OPPOSITE, BEING SO FIGETY OR RESTLESS THAT YOU HAVE BEEN MOVING AROUND A LOT MORE THAN USUAL: 0

## 2023-01-13 NOTE — PROGRESS NOTES
James Thomas (:  1970) is a 47 y.o. male,Established patient, here for evaluation of the following chief complaint(s):  Diabetes (Check up. ), Hypertension, Hyperlipidemia, Mental Health Problem, Gastroesophageal Reflux, Peripheral Neuropathy, and Leg Pain (Right leg pain. Little relief with gabapentin. Pain worse with exercise. )         ASSESSMENT/PLAN:  1. Benign essential HTN  2. Cigarette smoker  3. Microalbuminuria  4. Mixed hyperlipidemia  -     Comprehensive Metabolic Panel; Future  -     Lipid Panel; Future  5. Vitamin D deficiency  6. Gastroesophageal reflux disease without esophagitis  7. DM hyperosmolarity type II, uncontrolled ([facility] [address]  -     Comprehensive Metabolic Panel; Future  -     Hemoglobin A1C; Future  8. Prostate cancer screening  -     PSA Screening; Future  9. Chronic pain of right lower extremity      Plan:  1. Benign essential HTN  Controlled on Lopressor and Losartan. No change in medication. 2. Cigarette smoker  Continue to cut back. 3. Microalbuminuria  On Losartan. 4. Mixed hyperlipidemia  Controlled on statin (Crestor). No change in medication.  - Comprehensive Metabolic Panel; Future  - Lipid Panel; Future    5. Vitamin D deficiency  Controlled on Vitamin D replacement. 6. Gastroesophageal reflux disease without esophagitis  Controlled on PPI / H2 blocker. 7. DM hyperosmolarity type II, uncontrolled ([address]  Improving with exercise. Fasting labs anytime. - Comprehensive Metabolic Panel; Future  - Hemoglobin A1C; Future    8. Prostate cancer screening  PSA with labs. - PSA Screening; Future    9. Chronic pain of right lower extremity  Increase Gabapentin to two to three times a day. James Thomas was instructed to follow up in the clinic in 3 months for check up or as needed with any medical issues. Subjective   SUBJECTIVE/OBJECTIVE:  Ignacia Beavers presents for a check up on his medical conditions DM II, HTN, Hyperlipidemia, .   Preston admits to new problems (worsen leg pain with exercise). Medications were reviewed with Arun Sin, he is  tolerating the medication. Bowels are regular. There has not been rectal bleeding. Arun Sin denies urinary complications, the urine stream is good. Preston denies chest pain and denies increasing shortness of breath. Depression / anxiety:  well controlled on Effexor.       Past Medical History:  No date: GERD (gastroesophageal reflux disease)  No date: Hyperlipidemia  No date: Hypertension  No date: Microalbuminuria  No date: PAD (peripheral artery disease) ([facility])    Past Surgical History:  No date: APPENDECTOMY  2012: ARTERIAL BYPASS SURGRY  2006: CARDIAC SURGERY  2019: COLECTOMY      Comment:  sigmoid resection ([facility])  2020: HERNIA REPAIR      Comment:  Dr Sylvia Wilkerson  2019: SMALL INTESTINE SURGERY      Comment:  small bowel resection ([facility])    Social History    Socioeconomic History      Marital status:       Spouse name: Not on file      Number of children: Not on file      Years of education: Not on file      Highest education level: Not on file    Occupational History      Occupation: disability    Tobacco Use      Smoking status: Some Days        Packs/day: 0.25        Years: 20.00        Pack years: 5        Types: Cigarettes        Last attempt to quit: 10/27/2011        Years since quittin.2      Smokeless tobacco: Never      Tobacco comments: wife smokes, passive    Substance and Sexual Activity      Alcohol use: Not on file      Drug use: Not on file      Sexual activity: Not on file    Other Topics      Concerns:        Not on file    Social History Narrative      Not on file    Social Determinants of Health  Financial Resource Strain: Low Risk       Difficulty of Paying Living Expenses: Not hard at all  Food Insecurity: No Food Insecurity      Worried About Running Out of Food in the Last Year: Never true      Ran Out of Food in the Last Year: Never true  Transportation Needs: Not on file  Physical Activity: Insufficiently Active      Days of Exercise per Week: 2 days      Minutes of Exercise per Session: 20 min  Stress: Not on file  Social Connections: Not on file  Intimate Partner Violence: Not on file  Housing Stability: Not on file    No family history on file.       Current Outpatient Medications on File Prior to Visit:  insulin detemir (LEVEMIR FLEXTOUCH) 100 UNIT/ML injection pen, Inject 60 Units into the skin 2 times daily, Disp: 12 Adjustable Dose Pre-filled Pen Syringe, Rfl: 5  cilostazol (PLETAL) 100 MG tablet, TAKE 1 TABLET BY MOUTH TWICE DAILY, Disp: 60 tablet, Rfl: 5  hydrOXYzine HCl (ATARAX) 25 MG tablet, TAKE 1 TABLET BY MOUTH THREE TIMES DAILY AS NEEDED FOR ANXIETY, Disp: 90 tablet, Rfl: 5  metoprolol tartrate (LOPRESSOR) 50 MG tablet, TAKE 1 TABLET BY MOUTH TWICE DAILY, Disp: 60 tablet, Rfl: 5  gabapentin (NEURONTIN) 300 MG capsule, TAKE 1 CAPSULE BY MOUTH THREE TIMES DAILY, Disp: 90 capsule, Rfl: 3  metFORMIN (GLUCOPHAGE) 1000 MG tablet, TAKE 1 TABLET BY MOUTH TWICE DAILY WITH MEALS, Disp: 60 tablet, Rfl: 5  pantoprazole (PROTONIX) 40 MG tablet, TAKE 2 TABLETS BY MOUTH EVERY DAY, Disp: 60 tablet, Rfl: 5  venlafaxine (EFFEXOR XR) 75 MG extended release capsule, Take 1 capsule by mouth daily, Disp: 30 capsule, Rfl: 5  rosuvastatin (CRESTOR) 10 MG tablet, Take 1 tablet by mouth nightly, Disp: 30 tablet, Rfl: 5  amLODIPine (NORVASC) 5 MG tablet, TAKE 1 TABLET BY MOUTH EVERY DAY, Disp: 30 tablet, Rfl: 5  Magnesium 400 MG TABS, Take 400 mg by mouth daily, Disp: 30 tablet, Rfl: 1  insulin lispro, 1 Unit Dial, (HUMALOG KWIKPEN) 100 UNIT/ML SOPN, Inject 3 Units into the skin 3 times daily (before meals), Disp: 5 pen, Rfl: 3  losartan (COZAAR) 100 MG tablet, Take 1 tablet by mouth daily, Disp: 30 tablet, Rfl: 5  Insulin Pen Needle 31G X 5 MM MISC, 1 each by Does not apply route daily, Disp: 100 each, Rfl: 5  Cholecalciferol (VITAMIN D) 50 MCG (2000 UT) CAPS capsule, Take by mouth, Disp: , Rfl:   [facility] Unilet Lancets 28G MISC, Test TID and prn. DX E11.9, Z79.4, Disp: 100 each, Rfl: 5  blood glucose monitor strips, 1 strip by Other route 3 times daily as needed for Other (as needed) Dx E11.9, Z79.4 [facility] Unilet Test strips, Disp: 100 strip, Rfl: 5  Insulin Syringe-Needle U-100 30G X 1/2\" 0.5 ML MISC, Dx E11.9, Z79.4 injecting insulin QD, Disp: 100 each, Rfl: 3  magnesium oxide (MAG-OX) 400 (241.3 Mg) MG TABS tablet, TAKE 1 TABLET BY MOUTH DAILY, Disp: , Rfl:   loperamide (IMODIUM) 2 MG capsule, Take 2 mg by mouth 4 times daily as needed for Diarrhea, Disp: , Rfl:   TRUE METRIX BLOOD GLUCOSE TEST strip, Test BS TID and prn., Disp: 50 each, Rfl: 11  aspirin 81 MG EC tablet, Take 81 mg by mouth daily. , Disp: , Rfl:     No current facility-administered medications on file prior to visit.        -- Oxycodone-Acetaminophen     --  Other reaction(s): Mental Status Change      Lab Results       Component                Value               Date                       NA                       134 (L)             10/09/2020                 K                        4.9                 10/25/2021                 CL                       98                  10/09/2020                 CO2                      21                  10/09/2020                 BUN                      17                  10/09/2020                 CREATININE               0.98                10/25/2021                 GLUCOSE                  199 (H)             10/09/2020                 CALCIUM                  10.0                10/09/2020                 PROT                     7.0                 10/09/2020                 LABALBU                  4.8                 10/09/2020                 BILITOT                  0.45                10/09/2020                 ALKPHOS                  105                 10/09/2020                 AST                      47 (H)              10/09/2020                 ALT 74 (H)              10/09/2020                 LABGLOM                  >60                 10/09/2020                 GFRAA                    >60                 10/09/2020              Lab Results       Component                Value               Date                       LABA1C                   10.2                04/29/2022            Lab Results       Component                Value               Date                       EAG                      203                 01/14/2019              Lab Results       Component                Value               Date                       CHOL                     118                 10/25/2021                 CHOL                     197                 04/09/2021                 CHOL                     179                 12/22/2020            Lab Results       Component                Value               Date                       TRIG                     202                 10/25/2021                 TRIG                     367                 04/09/2021                 TRIG                     295                 12/22/2020            Lab Results       Component                Value               Date                       HDL                      32 (A)              10/25/2021                 HDL                      33 (A)              04/09/2021                 HDL                      39                  12/22/2020            Lab Results       Component                Value               Date                       LDLCHOLESTEROL           85                  10/09/2020                 LDLCHOLESTEROL           139 (H)             01/14/2019                 LDLCHOLESTEROL           129                 10/08/2018                 LDLCALC                  53                  10/25/2021                 LDLCALC                  91                  04/09/2021                 LDLCALC                  91                  12/22/2020            Lab Results Component                Value               Date                       VLDL                     33                  10/25/2021                 VLDL                     49                  12/22/2020                 VLDL                                         10/09/2020             NOT REPORTED (H)  Lab Results       Component                Value               Date                       CHOLHDLRATIO             6.0                 04/09/2021                 CHOLHDLRATIO             5.7 (H)             10/09/2020                 CHOLHDLRATIO             6.3 (H)             01/14/2019                                Diabetes  He presents for his follow-up diabetic visit. He has type 2 diabetes mellitus. His disease course has been improving. Pertinent negatives for diabetes include no chest pain. There are no hypoglycemic complications. Symptoms are stable. Risk factors for coronary artery disease include dyslipidemia, diabetes mellitus, hypertension, male sex and family history. Current diabetic treatment includes insulin injections. He is compliant with treatment all of the time. He is following a diabetic diet. There is no change in his home blood glucose trend. An ACE inhibitor/angiotensin II receptor blocker is being taken. Hypertension  This is a chronic problem. The current episode started more than 1 year ago. The problem is unchanged. The problem is controlled. Pertinent negatives include no chest pain, neck pain or palpitations. Past treatments include angiotensin blockers and beta blockers. There is no history of angina. Hyperlipidemia  This is a chronic problem. The current episode started more than 1 year ago. The problem is controlled. Recent lipid tests were reviewed and are normal. Pertinent negatives include no chest pain or myalgias. Current antihyperlipidemic treatment includes statins. The current treatment provides significant improvement of lipids. There are no compliance problems. Gastroesophageal Reflux  He reports no abdominal pain, no chest pain, no nausea or no sore throat. This is a chronic problem. The current episode started more than 1 year ago. The problem occurs rarely. The problem has been unchanged. He has tried a PPI and a histamine-2 antagonist for the symptoms. The treatment provided significant relief. Review of Systems   Constitutional: Negative. HENT:  Negative for congestion, ear pain, postnasal drip, sneezing and sore throat. Eyes:  Negative for visual disturbance. Respiratory: Negative. Cardiovascular:  Negative for chest pain, palpitations and leg swelling. Gastrointestinal:  Negative for abdominal pain, blood in stool, constipation, diarrhea and nausea. Genitourinary:  Negative for difficulty urinating, dysuria, frequency and urgency. Musculoskeletal:  Positive for arthralgias. Negative for joint swelling, myalgias, neck pain and neck stiffness. Skin: Negative. Neurological:  Negative for syncope. Psychiatric/Behavioral: Negative. Objective   Physical Exam  Vitals and nursing note reviewed. Constitutional:       Appearance: He is well-developed. HENT:      Head: Atraumatic. Eyes:      Conjunctiva/sclera: Conjunctivae normal.   Cardiovascular:      Rate and Rhythm: Normal rate and regular rhythm. Heart sounds: Normal heart sounds. Pulmonary:      Effort: Pulmonary effort is normal.      Breath sounds: Normal breath sounds. Abdominal:      Palpations: Abdomen is soft. Tenderness: There is no abdominal tenderness. Musculoskeletal:      Cervical back: Normal range of motion and neck supple. Lymphadenopathy:      Cervical: No cervical adenopathy. Skin:     Findings: No rash.       Comments: Diabetic foot exam:   Left Foot:   Visual Exam: normal   Pulse DP: 2+ (normal)   Filament test: normal sensation   Vibratory Sensation: not performed  Right Foot:   Visual Exam: normal   Pulse DP: 2+ (normal)   Filament test: normal sensation   Vibratory Sensation: not examined   Neurological:      Mental Status: He is alert. Psychiatric:         Behavior: Behavior normal.         Thought Content: Thought content normal.                An electronic signature was used to authenticate this note.     --Jas Her MD

## 2023-01-13 NOTE — PATIENT INSTRUCTIONS
Survey: You may be receiving a survey from [employer] regarding your visit today. You may get this in the mail, through your MyChart or in your email. Please complete the survey to enable us to provide the highest quality of care to you and your family. Please also, mention our names. If you cannot score us as very good (5 Stars) on any question, please feel free to call the office to discuss how we could have made your experience exceptional.      Thank You! MD Bismark Lawrence, [address] [place] [address], Rodrigue Grimm, ELIZAN RN    Nahomy Newman       Plan:  1. Benign essential HTN  Controlled on Lopressor and Losartan. No change in medication. 2. Cigarette smoker  Continue to cut back. 3. Microalbuminuria  On Losartan. 4. Mixed hyperlipidemia  Controlled on statin (Crestor). No change in medication.  - Comprehensive Metabolic Panel; Future  - Lipid Panel; Future    5. Vitamin D deficiency  Controlled on Vitamin D replacement. 6. Gastroesophageal reflux disease without esophagitis  Controlled on PPI / H2 blocker. 7. DM hyperosmolarity type II, uncontrolled ([address]  Improving with exercise. Fasting labs anytime. - Comprehensive Metabolic Panel; Future  - Hemoglobin A1C; Future    8. Prostate cancer screening  PSA with labs. - PSA Screening; Future    9. Chronic pain of right lower extremity  Increase Gabapentin to two to three times a day. Suni Veraju was instructed to follow up in the clinic in 3 months for check up or as needed with any medical issues.

## 2023-01-18 LAB
ALBUMIN SERPL-MCNC: NORMAL G/DL
ALP BLD-CCNC: NORMAL U/L
ALT SERPL-CCNC: NORMAL U/L
ANION GAP SERPL CALCULATED.3IONS-SCNC: NORMAL MMOL/L
AST SERPL-CCNC: NORMAL U/L
AVERAGE GLUCOSE: NORMAL
BILIRUB SERPL-MCNC: NORMAL MG/DL
BUN BLDV-MCNC: NORMAL MG/DL
CALCIUM SERPL-MCNC: NORMAL MG/DL
CHLORIDE BLD-SCNC: NORMAL MMOL/L
CHOLESTEROL, TOTAL: 154 MG/DL
CHOLESTEROL/HDL RATIO: NORMAL
CO2: NORMAL
CREAT SERPL-MCNC: 1.01 MG/DL
GFR CALCULATED: NORMAL
GLUCOSE BLD-MCNC: NORMAL MG/DL
HBA1C MFR BLD: 11.1 %
HDLC SERPL-MCNC: 39 MG/DL (ref 35–70)
LDL CHOLESTEROL CALCULATED: 78 MG/DL (ref 0–160)
NONHDLC SERPL-MCNC: NORMAL MG/DL
POTASSIUM SERPL-SCNC: 5 MMOL/L
SODIUM BLD-SCNC: NORMAL MMOL/L
TOTAL PROTEIN: NORMAL
TRIGL SERPL-MCNC: 220 MG/DL
VLDLC SERPL CALC-MCNC: 37 MG/DL

## 2023-01-19 DIAGNOSIS — E11.65 UNCONTROLLED TYPE 2 DIABETES MELLITUS WITH HYPERGLYCEMIA (HCC): Primary | ICD-10-CM

## 2023-01-19 DIAGNOSIS — Z12.5 PROSTATE CANCER SCREENING: ICD-10-CM

## 2023-01-19 DIAGNOSIS — E78.2 MIXED HYPERLIPIDEMIA: ICD-10-CM

## 2023-01-19 DIAGNOSIS — E11.00 DM HYPEROSMOLARITY TYPE II, UNCONTROLLED (HCC): ICD-10-CM

## 2023-01-19 DIAGNOSIS — E11.65 DM HYPEROSMOLARITY TYPE II, UNCONTROLLED (HCC): ICD-10-CM

## 2023-01-26 DIAGNOSIS — E11.65 UNCONTROLLED TYPE 2 DIABETES MELLITUS WITH HYPERGLYCEMIA (HCC): ICD-10-CM

## 2023-01-26 RX ORDER — INSULIN DETEMIR 100 [IU]/ML
60 INJECTION, SOLUTION SUBCUTANEOUS 2 TIMES DAILY
Qty: 15 ADJUSTABLE DOSE PRE-FILLED PEN SYRINGE | Refills: 5 | Status: SHIPPED | OUTPATIENT
Start: 2023-01-26

## 2023-01-26 NOTE — TELEPHONE ENCOUNTER
Last OV: 1/13/2023  Last RX:    Next scheduled apt: Visit date not found  Patient needs new script for higher quant because pharmacy will not dispense partial box.

## 2023-01-27 ENCOUNTER — TELEPHONE (OUTPATIENT)
Dept: FAMILY MEDICINE CLINIC | Age: 53
End: 2023-01-27

## 2023-01-27 DIAGNOSIS — E11.00 DM HYPEROSMOLARITY TYPE II, UNCONTROLLED (HCC): Primary | ICD-10-CM

## 2023-01-27 DIAGNOSIS — E11.65 DM HYPEROSMOLARITY TYPE II, UNCONTROLLED (HCC): Primary | ICD-10-CM

## 2023-01-27 RX ORDER — INSULIN DEGLUDEC 200 U/ML
60 INJECTION, SOLUTION SUBCUTANEOUS 2 TIMES DAILY
Qty: 5 ADJUSTABLE DOSE PRE-FILLED PEN SYRINGE | Refills: 0 | Status: SHIPPED | OUTPATIENT
Start: 2023-01-27

## 2023-01-27 NOTE — TELEPHONE ENCOUNTER
Pt called stating there is a back order on levemir. Pt is out of medication. Can you send in a 2 week supply of something comparable? He does not want to change permanently. Call back requested. Rx to DM on [address].

## 2023-02-09 DIAGNOSIS — E11.65 DM HYPEROSMOLARITY TYPE II, UNCONTROLLED (HCC): ICD-10-CM

## 2023-02-09 DIAGNOSIS — E11.00 DM HYPEROSMOLARITY TYPE II, UNCONTROLLED (HCC): ICD-10-CM

## 2023-02-09 RX ORDER — INSULIN DEGLUDEC 200 U/ML
60 INJECTION, SOLUTION SUBCUTANEOUS 2 TIMES DAILY
Qty: 9 ADJUSTABLE DOSE PRE-FILLED PEN SYRINGE | Refills: 2 | Status: SHIPPED | OUTPATIENT
Start: 2023-02-09

## 2023-02-09 NOTE — TELEPHONE ENCOUNTER
Pt called stating he has started a carb restricted diet, and exercise and his morning Bs have been 101 for approx 1 week , his evening BS have been running 150. Pt is questioning if he still needs to continue taking his insulin? Call back requested. Insulin refill of Tresiba needed if pt is to continue with prescription.  Please send to KASANDRA fong

## 2023-02-14 ENCOUNTER — TELEPHONE (OUTPATIENT)
Dept: FAMILY MEDICINE CLINIC | Age: 53
End: 2023-02-14

## 2023-02-23 DIAGNOSIS — K21.9 GASTROESOPHAGEAL REFLUX DISEASE WITHOUT ESOPHAGITIS: ICD-10-CM

## 2023-02-23 DIAGNOSIS — E11.42 TYPE 2 DIABETES MELLITUS WITH DIABETIC POLYNEUROPATHY, WITH LONG-TERM CURRENT USE OF INSULIN (HCC): ICD-10-CM

## 2023-02-23 DIAGNOSIS — M54.50 CHRONIC MIDLINE LOW BACK PAIN WITHOUT SCIATICA: ICD-10-CM

## 2023-02-23 DIAGNOSIS — E11.42 DIABETIC PERIPHERAL NEUROPATHY (HCC): ICD-10-CM

## 2023-02-23 DIAGNOSIS — G89.29 CHRONIC MIDLINE LOW BACK PAIN WITHOUT SCIATICA: ICD-10-CM

## 2023-02-23 DIAGNOSIS — E78.2 MIXED HYPERLIPIDEMIA: ICD-10-CM

## 2023-02-23 DIAGNOSIS — E11.65 DM HYPEROSMOLARITY TYPE II, UNCONTROLLED (HCC): ICD-10-CM

## 2023-02-23 DIAGNOSIS — F41.1 GENERALIZED ANXIETY DISORDER: ICD-10-CM

## 2023-02-23 DIAGNOSIS — F43.23 SITUATIONAL MIXED ANXIETY AND DEPRESSIVE DISORDER: ICD-10-CM

## 2023-02-23 DIAGNOSIS — E11.00 DM HYPEROSMOLARITY TYPE II, UNCONTROLLED (HCC): ICD-10-CM

## 2023-02-23 DIAGNOSIS — Z79.4 TYPE 2 DIABETES MELLITUS WITH DIABETIC POLYNEUROPATHY, WITH LONG-TERM CURRENT USE OF INSULIN (HCC): ICD-10-CM

## 2023-02-23 DIAGNOSIS — I10 BENIGN ESSENTIAL HTN: ICD-10-CM

## 2023-02-23 RX ORDER — GLUCOSAMINE HCL/CHONDROITIN SU 500-400 MG
CAPSULE ORAL
Qty: 300 STRIP | Refills: 1 | Status: SHIPPED | OUTPATIENT
Start: 2023-02-23

## 2023-02-23 RX ORDER — METOPROLOL TARTRATE 50 MG/1
50 TABLET, FILM COATED ORAL 2 TIMES DAILY
Qty: 180 TABLET | Refills: 1 | Status: SHIPPED | OUTPATIENT
Start: 2023-02-23

## 2023-02-23 RX ORDER — INSULIN DEGLUDEC 200 U/ML
60 INJECTION, SOLUTION SUBCUTANEOUS 2 TIMES DAILY
Qty: 18 ADJUSTABLE DOSE PRE-FILLED PEN SYRINGE | Refills: 1 | Status: SHIPPED | OUTPATIENT
Start: 2023-02-23

## 2023-02-23 RX ORDER — CYCLOBENZAPRINE HCL 10 MG
TABLET ORAL
COMMUNITY
Start: 2023-01-25 | End: 2023-02-23 | Stop reason: SDUPTHER

## 2023-02-23 RX ORDER — AMLODIPINE BESYLATE 5 MG/1
5 TABLET ORAL DAILY
Qty: 90 TABLET | Refills: 1 | Status: SHIPPED | OUTPATIENT
Start: 2023-02-23

## 2023-02-23 RX ORDER — LOSARTAN POTASSIUM 100 MG/1
100 TABLET ORAL DAILY
Qty: 90 TABLET | Refills: 1 | Status: SHIPPED | OUTPATIENT
Start: 2023-02-23

## 2023-02-23 RX ORDER — ROSUVASTATIN CALCIUM 10 MG/1
10 TABLET, COATED ORAL NIGHTLY
Qty: 90 TABLET | Refills: 1 | Status: SHIPPED | OUTPATIENT
Start: 2023-02-23

## 2023-02-23 RX ORDER — VENLAFAXINE HYDROCHLORIDE 75 MG/1
75 CAPSULE, EXTENDED RELEASE ORAL DAILY
Qty: 90 CAPSULE | Refills: 1 | Status: SHIPPED | OUTPATIENT
Start: 2023-02-23

## 2023-02-23 RX ORDER — PEN NEEDLE, DIABETIC 31 GX5/16"
NEEDLE, DISPOSABLE MISCELLANEOUS
Qty: 500 EACH | Refills: 1 | Status: SHIPPED | OUTPATIENT
Start: 2023-02-23

## 2023-02-23 RX ORDER — CYCLOBENZAPRINE HCL 10 MG
10 TABLET ORAL NIGHTLY PRN
Qty: 90 TABLET | Refills: 1 | Status: SHIPPED | OUTPATIENT
Start: 2023-02-23

## 2023-02-23 RX ORDER — CILOSTAZOL 100 MG/1
100 TABLET ORAL 2 TIMES DAILY
Qty: 180 TABLET | Refills: 1 | Status: SHIPPED | OUTPATIENT
Start: 2023-02-23

## 2023-02-23 RX ORDER — ROSUVASTATIN CALCIUM 10 MG/1
10 TABLET, COATED ORAL NIGHTLY
Qty: 30 TABLET | Refills: 5 | OUTPATIENT
Start: 2023-02-23

## 2023-02-23 RX ORDER — HYDROXYZINE HYDROCHLORIDE 25 MG/1
25 TABLET, FILM COATED ORAL 3 TIMES DAILY PRN
Qty: 270 TABLET | Refills: 1 | Status: SHIPPED | OUTPATIENT
Start: 2023-02-23

## 2023-02-23 RX ORDER — LANCETS 30 GAUGE
EACH MISCELLANEOUS
Qty: 300 EACH | Refills: 1 | Status: SHIPPED | OUTPATIENT
Start: 2023-02-23

## 2023-02-23 RX ORDER — GABAPENTIN 300 MG/1
300 CAPSULE ORAL 3 TIMES DAILY
Qty: 270 CAPSULE | Refills: 1 | Status: SHIPPED | OUTPATIENT
Start: 2023-02-23 | End: 2023-08-22

## 2023-02-23 RX ORDER — PANTOPRAZOLE SODIUM 40 MG/1
80 TABLET, DELAYED RELEASE ORAL DAILY
Qty: 180 TABLET | Refills: 1 | Status: SHIPPED | OUTPATIENT
Start: 2023-02-23

## 2023-03-07 ENCOUNTER — TELEPHONE (OUTPATIENT)
Dept: FAMILY MEDICINE CLINIC | Age: 53
End: 2023-03-07

## 2023-03-07 NOTE — LETTER
March 7, 2023       Preston Yen YOB: 1970   [address]  [facility] [address] Date of Visit:  3/7/2023       To Whom It May Concern:    It is my medical opinion that Preston Yen requires a disability parking placard for the following reasons:  He cannot walk 200 feet without stopping to rest.  Duration of need: 5 years    If you have any questions or concerns, please don't hesitate to call.    Sincerely,        John Mathias MD

## 2023-03-13 RX ORDER — DIPHENHYDRAMINE HYDROCHLORIDE 25 MG/1
CAPSULE, LIQUID FILLED ORAL
Qty: 1 KIT | Refills: 0 | Status: SHIPPED | OUTPATIENT
Start: 2023-03-13

## 2023-03-26 DIAGNOSIS — E78.2 MIXED HYPERLIPIDEMIA: ICD-10-CM

## 2023-03-27 RX ORDER — ROSUVASTATIN CALCIUM 10 MG/1
10 TABLET, COATED ORAL NIGHTLY
Qty: 30 TABLET | Refills: 0 | Status: SHIPPED | OUTPATIENT
Start: 2023-03-27

## 2023-03-27 NOTE — TELEPHONE ENCOUNTER
Health Maintenance   Topic Date Due    Hepatitis C screen  Never done    Hepatitis B vaccine (1 of 3 - Risk 3-dose series) Never done    Shingles vaccine (1 of 2) Never done    Diabetic Alb to Cr ratio (uACR) test  10/09/2021    GFR test (Diabetes, CKD 3-4, OR last GFR 15-59)  10/09/2021    COVID-19 Vaccine (5 - Booster for Moderna series) 06/16/2022    DTaP/Tdap/Td vaccine (2 - Td or Tdap) 11/19/2022    A1C test (Diabetic or Prediabetic)  04/18/2023    Diabetic retinal exam  04/25/2023    Pneumococcal 0-64 years Vaccine (2 - PCV) 04/29/2023    Annual Wellness Visit (AWV)  11/23/2023    Colorectal Cancer Screen  12/07/2023    Diabetic foot exam  01/13/2024    Depression Monitoring  01/13/2024    Lipids  01/18/2024    Flu vaccine  Completed    HIV screen  Completed    Hepatitis A vaccine  Aged Out    Hib vaccine  Aged Out    Meningococcal (ACWY) vaccine  Aged Out             (applicable per patient's age: Cancer Screenings, Depression Screening, Fall Risk Screening, Immunizations)    Hemoglobin A1C (%)   Date Value   01/18/2023 11.1   04/29/2022 10.2   10/25/2021 11.5     Microalb/Crt.  Ratio (mcg/mg creat)   Date Value   10/09/2020 24 (H)     LDL Cholesterol (mg/dL)   Date Value   10/09/2020 85     LDL Calculated (mg/dL)   Date Value   01/18/2023 78     AST (U/L)   Date Value   10/09/2020 47 (H)     ALT (U/L)   Date Value   10/09/2020 74 (H)     BUN (mg/dL)   Date Value   10/09/2020 17      (goal A1C is < 7)   (goal LDL is <100) need 30-50% reduction from baseline     BP Readings from Last 3 Encounters:   01/13/23 136/88   11/22/22 137/88   10/07/22 122/82    (goal /80)      All Future Testing planned in [facility]:  Lab Frequency Next Occurrence   Microalbumin / Creatinine Urine Ratio Once 10/29/2022   Hepatitis C Antibody Once 04/29/2022       Next Visit Date:  Future Appointments   Date Time Provider Dwaine Pal   12/4/2023  2:30 PM [facility]            Patient Active Problem

## 2023-04-04 DIAGNOSIS — E11.00 DM HYPEROSMOLARITY TYPE II, UNCONTROLLED (HCC): ICD-10-CM

## 2023-04-04 DIAGNOSIS — E11.65 DM HYPEROSMOLARITY TYPE II, UNCONTROLLED (HCC): ICD-10-CM

## 2023-04-06 ENCOUNTER — TELEPHONE (OUTPATIENT)
Dept: PHARMACY | Facility: CLINIC | Age: 53
End: 2023-04-06

## 2023-04-06 RX ORDER — INSULIN DEGLUDEC 200 U/ML
INJECTION, SOLUTION SUBCUTANEOUS
Qty: 18 ML | Refills: 3 | Status: SHIPPED | OUTPATIENT
Start: 2023-04-06

## 2023-04-06 NOTE — TELEPHONE ENCOUNTER
01/18/2023    TRIG 220 01/18/2023    HDL 39 01/18/2023    LDLCHOLESTEROL 85 10/09/2020    LDLCALC 78 01/18/2023     ALT   Date Value Ref Range Status   10/09/2020 74 (H) 5 - 41 U/L Final     AST   Date Value Ref Range Status   10/09/2020 47 (H) <40 U/L Final     The 10-year ASCVD risk score (Nandini GERBER, et al., 2019) is: 17.6%    Values used to calculate the score:      Age: 47 years      Sex: Male      Is Non- : No      Diabetic: Yes      Tobacco smoker: Yes      Systolic Blood Pressure: 936 mmHg      Is BP treated: Yes      HDL Cholesterol: 39 mg/dL      Total Cholesterol: 154 mg/dL       The following are interventions that have been identified:   Patient overdue refilling Rosuvastatin 10mg and active on home medication list.     Attempting to reach patient to review. Left message asking for return call. MyChart message sent to patient. Last Visit: 1/13/23    Future Appointments   Date Time Provider Dwaine Pal   12/4/2023  2:30 PM MD [id number]       Yvonne Still [facility].    [address] [facility] free: [phone]     For Pharmacy Admin Tracking Only    Program: [address] in place:  No  Recommendation Provided To: Pharmacy: 1  Intervention Detail: Refill(s) Provided  Intervention Accepted By: Pharmacy: 1  Gap Closed?: Yes   Time Spent (min): 15

## 2023-05-01 ENCOUNTER — TELEPHONE (OUTPATIENT)
Dept: FAMILY MEDICINE CLINIC | Age: 53
End: 2023-05-01

## 2023-05-09 DIAGNOSIS — E11.65 UNCONTROLLED TYPE 2 DIABETES MELLITUS WITH HYPERGLYCEMIA (HCC): Primary | ICD-10-CM

## 2023-05-09 RX ORDER — SEMAGLUTIDE 1.34 MG/ML
0.25 INJECTION, SOLUTION SUBCUTANEOUS WEEKLY
Qty: 4 ADJUSTABLE DOSE PRE-FILLED PEN SYRINGE | Refills: 5 | Status: SHIPPED | OUTPATIENT
Start: 2023-05-09

## 2023-07-19 DIAGNOSIS — E78.2 MIXED HYPERLIPIDEMIA: ICD-10-CM

## 2023-07-20 RX ORDER — BLOOD SUGAR DIAGNOSTIC
STRIP MISCELLANEOUS
Qty: 100 STRIP | Refills: 3 | Status: SHIPPED | OUTPATIENT
Start: 2023-07-20

## 2023-07-20 RX ORDER — LANCETS 30 GAUGE
EACH MISCELLANEOUS
Qty: 100 EACH | Refills: 3 | Status: SHIPPED | OUTPATIENT
Start: 2023-07-20

## 2023-07-20 RX ORDER — ROSUVASTATIN CALCIUM 10 MG/1
10 TABLET, COATED ORAL NIGHTLY
Qty: 30 TABLET | Refills: 5 | Status: SHIPPED | OUTPATIENT
Start: 2023-07-20

## 2023-08-24 DIAGNOSIS — E11.42 TYPE 2 DIABETES MELLITUS WITH DIABETIC POLYNEUROPATHY, WITH LONG-TERM CURRENT USE OF INSULIN (HCC): ICD-10-CM

## 2023-08-24 DIAGNOSIS — K21.9 GASTROESOPHAGEAL REFLUX DISEASE WITHOUT ESOPHAGITIS: ICD-10-CM

## 2023-08-24 DIAGNOSIS — G89.29 CHRONIC MIDLINE LOW BACK PAIN WITHOUT SCIATICA: ICD-10-CM

## 2023-08-24 DIAGNOSIS — F41.1 GENERALIZED ANXIETY DISORDER: ICD-10-CM

## 2023-08-24 DIAGNOSIS — F43.23 SITUATIONAL MIXED ANXIETY AND DEPRESSIVE DISORDER: ICD-10-CM

## 2023-08-24 DIAGNOSIS — I10 BENIGN ESSENTIAL HTN: ICD-10-CM

## 2023-08-24 DIAGNOSIS — Z79.4 TYPE 2 DIABETES MELLITUS WITH DIABETIC POLYNEUROPATHY, WITH LONG-TERM CURRENT USE OF INSULIN (HCC): ICD-10-CM

## 2023-08-24 DIAGNOSIS — M54.50 CHRONIC MIDLINE LOW BACK PAIN WITHOUT SCIATICA: ICD-10-CM

## 2023-08-24 DIAGNOSIS — E11.42 DIABETIC PERIPHERAL NEUROPATHY (HCC): ICD-10-CM

## 2023-08-24 RX ORDER — CILOSTAZOL 100 MG/1
100 TABLET ORAL 2 TIMES DAILY
Qty: 180 TABLET | Refills: 1 | Status: SHIPPED | OUTPATIENT
Start: 2023-08-24

## 2023-08-24 RX ORDER — HYDROXYZINE HYDROCHLORIDE 25 MG/1
25 TABLET, FILM COATED ORAL 3 TIMES DAILY PRN
Qty: 270 TABLET | Refills: 1 | Status: SHIPPED | OUTPATIENT
Start: 2023-08-24

## 2023-08-24 RX ORDER — CYCLOBENZAPRINE HCL 10 MG
10 TABLET ORAL NIGHTLY PRN
Qty: 90 TABLET | Refills: 1 | Status: SHIPPED | OUTPATIENT
Start: 2023-08-24

## 2023-08-24 RX ORDER — AMLODIPINE BESYLATE 5 MG/1
5 TABLET ORAL DAILY
Qty: 90 TABLET | Refills: 1 | Status: SHIPPED | OUTPATIENT
Start: 2023-08-24

## 2023-08-24 RX ORDER — PANTOPRAZOLE SODIUM 40 MG/1
80 TABLET, DELAYED RELEASE ORAL DAILY
Qty: 180 TABLET | Refills: 1 | Status: SHIPPED | OUTPATIENT
Start: 2023-08-24

## 2023-08-24 RX ORDER — METOPROLOL TARTRATE 50 MG/1
50 TABLET, FILM COATED ORAL 2 TIMES DAILY
Qty: 180 TABLET | Refills: 1 | Status: SHIPPED | OUTPATIENT
Start: 2023-08-24

## 2023-08-24 RX ORDER — LOSARTAN POTASSIUM 100 MG/1
100 TABLET ORAL DAILY
Qty: 90 TABLET | Refills: 1 | Status: SHIPPED | OUTPATIENT
Start: 2023-08-24

## 2023-08-24 RX ORDER — GABAPENTIN 300 MG/1
300 CAPSULE ORAL 3 TIMES DAILY
Qty: 270 CAPSULE | Refills: 1 | Status: SHIPPED | OUTPATIENT
Start: 2023-08-24 | End: 2024-02-20

## 2023-08-24 RX ORDER — VENLAFAXINE HYDROCHLORIDE 75 MG/1
75 CAPSULE, EXTENDED RELEASE ORAL DAILY
Qty: 90 CAPSULE | Refills: 1 | Status: SHIPPED | OUTPATIENT
Start: 2023-08-24

## 2023-10-03 ENCOUNTER — OFFICE VISIT (OUTPATIENT)
Dept: FAMILY MEDICINE CLINIC | Age: 53
End: 2023-10-03
Payer: MEDICARE

## 2023-10-03 VITALS
HEIGHT: 70 IN | SYSTOLIC BLOOD PRESSURE: 108 MMHG | WEIGHT: 195 LBS | DIASTOLIC BLOOD PRESSURE: 68 MMHG | BODY MASS INDEX: 27.92 KG/M2 | HEART RATE: 104 BPM

## 2023-10-03 DIAGNOSIS — R80.9 MICROALBUMINURIA: ICD-10-CM

## 2023-10-03 DIAGNOSIS — I10 BENIGN ESSENTIAL HTN: ICD-10-CM

## 2023-10-03 DIAGNOSIS — Z23 NEED FOR INFLUENZA VACCINATION: ICD-10-CM

## 2023-10-03 DIAGNOSIS — E11.65 UNCONTROLLED TYPE 2 DIABETES MELLITUS WITH HYPERGLYCEMIA (HCC): Primary | ICD-10-CM

## 2023-10-03 DIAGNOSIS — K21.9 GASTROESOPHAGEAL REFLUX DISEASE WITHOUT ESOPHAGITIS: ICD-10-CM

## 2023-10-03 DIAGNOSIS — E11.42 DIABETIC PERIPHERAL NEUROPATHY (HCC): ICD-10-CM

## 2023-10-03 DIAGNOSIS — I73.9 PVD (PERIPHERAL VASCULAR DISEASE) (HCC): ICD-10-CM

## 2023-10-03 DIAGNOSIS — E78.2 MIXED HYPERLIPIDEMIA: ICD-10-CM

## 2023-10-03 DIAGNOSIS — F43.23 SITUATIONAL MIXED ANXIETY AND DEPRESSIVE DISORDER: ICD-10-CM

## 2023-10-03 DIAGNOSIS — Z23 NEED FOR 23-POLYVALENT PNEUMOCOCCAL POLYSACCHARIDE VACCINE: ICD-10-CM

## 2023-10-03 PROCEDURE — 90732 PPSV23 VACC 2 YRS+ SUBQ/IM: CPT | Performed by: INTERNAL MEDICINE

## 2023-10-03 PROCEDURE — 99214 OFFICE O/P EST MOD 30 MIN: CPT | Performed by: INTERNAL MEDICINE

## 2023-10-03 PROCEDURE — G0008 ADMIN INFLUENZA VIRUS VAC: HCPCS | Performed by: INTERNAL MEDICINE

## 2023-10-03 PROCEDURE — 3078F DIAST BP <80 MM HG: CPT | Performed by: INTERNAL MEDICINE

## 2023-10-03 PROCEDURE — 90674 CCIIV4 VAC NO PRSV 0.5 ML IM: CPT | Performed by: INTERNAL MEDICINE

## 2023-10-03 PROCEDURE — 3074F SYST BP LT 130 MM HG: CPT | Performed by: INTERNAL MEDICINE

## 2023-10-03 PROCEDURE — 3046F HEMOGLOBIN A1C LEVEL >9.0%: CPT | Performed by: INTERNAL MEDICINE

## 2023-10-03 PROCEDURE — G0009 ADMIN PNEUMOCOCCAL VACCINE: HCPCS | Performed by: INTERNAL MEDICINE

## 2023-10-03 RX ORDER — INSULIN LISPRO 100 [IU]/ML
10 INJECTION, SOLUTION INTRAVENOUS; SUBCUTANEOUS
Qty: 1 ADJUSTABLE DOSE PRE-FILLED PEN SYRINGE | Refills: 0 | Status: SHIPPED
Start: 2023-10-03

## 2023-10-03 SDOH — ECONOMIC STABILITY: HOUSING INSECURITY
IN THE LAST 12 MONTHS, WAS THERE A TIME WHEN YOU DID NOT HAVE A STEADY PLACE TO SLEEP OR SLEPT IN A SHELTER (INCLUDING NOW)?: NO

## 2023-10-03 SDOH — ECONOMIC STABILITY: FOOD INSECURITY: WITHIN THE PAST 12 MONTHS, THE FOOD YOU BOUGHT JUST DIDN'T LAST AND YOU DIDN'T HAVE MONEY TO GET MORE.: NEVER TRUE

## 2023-10-03 SDOH — ECONOMIC STABILITY: FOOD INSECURITY: WITHIN THE PAST 12 MONTHS, YOU WORRIED THAT YOUR FOOD WOULD RUN OUT BEFORE YOU GOT MONEY TO BUY MORE.: NEVER TRUE

## 2023-10-03 SDOH — ECONOMIC STABILITY: INCOME INSECURITY: HOW HARD IS IT FOR YOU TO PAY FOR THE VERY BASICS LIKE FOOD, HOUSING, MEDICAL CARE, AND HEATING?: NOT VERY HARD

## 2023-10-03 ASSESSMENT — ENCOUNTER SYMPTOMS
SORE THROAT: 0
SHORTNESS OF BREATH: 0
DIARRHEA: 0
ABDOMINAL PAIN: 0
RESPIRATORY NEGATIVE: 1
BLOOD IN STOOL: 0
CONSTIPATION: 0
NAUSEA: 0

## 2023-10-03 NOTE — PATIENT INSTRUCTIONS
Survey: You may be receiving a survey from [employer] regarding your visit today. You may get this in the mail, through your MyChart or in your email. Please complete the survey to enable us to provide the highest quality of care to you and your family. Please also, mention our names. If you cannot score us as very good (5 Stars) on any question, please feel free to call the office to discuss how we could have made your experience exceptional.      Thank You! Dr. Nils Levin, MD Ora Jeans, [facility] [place], [facility], APRN CNP    Arpit Lowe, [address]       Plan:  1. Uncontrolled type 2 diabetes mellitus with hyperglycemia ([address])  Improving with the treatment with Endocrinology. Labs through Endo. - insulin detemir (LEVEMIR FLEXTOUCH) 100 UNIT/ML injection pen; Inject 60 Units into the skin nightly  Dispense: 15 Adjustable Dose Pre-filled Pen Syringe; Refill: 5  - insulin lispro, 1 Unit Dial, (HUMALOG KWIKPEN) 100 UNIT/ML SOPN; Inject 10 Units into the skin 3 times daily (before meals)  Dispense: 1 Adjustable Dose Pre-filled Pen Syringe; Refill: 0    2. Benign essential HTN  Controlled on Amlodipine, Losartan, and Lopressor. No change in medication. 3. PVD (peripheral vascular disease) ([address])  Symptoms have been stable. Controlled on Pletal.  Continues to follow with Vascular. 4. Diabetic peripheral neuropathy ([address])  Should improve with diabetic mgt. 5. Gastroesophageal reflux disease without esophagitis  Has been stable on Protonix. No change in medication. 6. Microalbuminuria  On an ARB. 7. Mixed hyperlipidemia  Controlled on Crestor. No change in dose. 8. Situational mixed anxiety and depressive disorder  Has been stable on Effexor. No change in medication. 9. Need for influenza vaccination  Preston was given an influenza vaccine today. - Influenza, FLUCELVAX, (age 10 mo+), IM, Preservative Free, 0.5 mL    10.  Need for 23-polyvalent pneumococcal polysaccharide

## 2023-10-03 NOTE — PROGRESS NOTES
Vaccine Information Sheet, \"Influenza - Inactivated\"  given to Olivia Delgado, or parent/legal guardian of  Olivia Delgado and verbalized understanding. Patient responses:    Have you ever had a reaction to a flu vaccine? No  Are you able to eat eggs without adverse effects? Yes  Do you have any current illness? No  Have you ever had Guillian Nottingham Syndrome? No    Flu vaccine given per order. Please see immunization tab.

## 2023-10-03 NOTE — PROGRESS NOTES
Ranjana Ying (:  1970) is a 47 y.o. male,Established patient, here for evaluation of the following chief complaint(s):  Diabetes (Check up. Follows with Endocrinology. ), Hypertension, Hyperlipidemia, Gastroesophageal Reflux, Mental Health Problem, and Peripheral Neuropathy         ASSESSMENT/PLAN:  1. Uncontrolled type 2 diabetes mellitus with hyperglycemia (HCC)  -     insulin detemir (LEVEMIR FLEXTOUCH) 100 UNIT/ML injection pen; Inject 60 Units into the skin nightly, Disp-15 Adjustable Dose Pre-filled Pen Syringe, R-5Adjust Sig  -     insulin lispro, 1 Unit Dial, (HUMALOG KWIKPEN) 100 UNIT/ML SOPN; Inject 10 Units into the skin 3 times daily (before meals), Disp-1 Adjustable Dose Pre-filled Pen Syringe, R-0Adjust Sig  2. Benign essential HTN  3. PVD (peripheral vascular disease) ([address])  4. Diabetic peripheral neuropathy ([address])  5. Gastroesophageal reflux disease without esophagitis  6. Microalbuminuria  7. Mixed hyperlipidemia  8. Situational mixed anxiety and depressive disorder  9. Need for influenza vaccination  -     Influenza, FLUCELVAX, (age 10 mo+), IM, Preservative Free, 0.5 mL  10. Need for 23-polyvalent pneumococcal polysaccharide vaccine  -     Pneumococcal, PPSV23, PNEUMOVAX 23, (age 2 yrs+), SC/IM      Plan:  1. Uncontrolled type 2 diabetes mellitus with hyperglycemia ([address])  Improving with the treatment with Endocrinology. Labs through Endo. - insulin detemir (LEVEMIR FLEXTOUCH) 100 UNIT/ML injection pen; Inject 60 Units into the skin nightly  Dispense: 15 Adjustable Dose Pre-filled Pen Syringe; Refill: 5  - insulin lispro, 1 Unit Dial, (HUMALOG KWIKPEN) 100 UNIT/ML SOPN; Inject 10 Units into the skin 3 times daily (before meals)  Dispense: 1 Adjustable Dose Pre-filled Pen Syringe; Refill: 0    2. Benign essential HTN  Controlled on Amlodipine, Losartan, and Lopressor. No change in medication. 3. PVD (peripheral vascular disease) ([address])  Symptoms have been stable.   Controlled on

## 2023-11-09 ENCOUNTER — TELEPHONE (OUTPATIENT)
Dept: FAMILY MEDICINE CLINIC | Age: 53
End: 2023-11-09

## 2023-11-09 NOTE — TELEPHONE ENCOUNTER
[facility] ([facility] ED Follow up Call    Reason for ED visit:  Covid     11/9/2023     Marlo Johnston , this is Janet Kahn from Dr. Jami Montes, [address] office, just calling to see how you are doing after your recent ED visit. Did you receive discharge instructions? Yes  Do you understand the discharge instructions? Yes  Did the ED give you any new prescriptions? No  Were you able to fill your prescriptions? N/A      Do you have one of our red, yellow and green  Zone sheets that help you to determine when you should go to the ED? Not Applicable      Do you need or want to make a follow up appt with your PCP? No  Confirmed patient is feeling better each day, no acute respiratory distress     Do you have any further needs in the home, e.g. equipment?   No        FU appts/Provider:    Future Appointments   Date Time Provider [address]   12/4/2023  2:30 PM [facility]   4/16/2024  9:30 AM Back, MD Robert Oglesby [facility]

## 2023-11-16 RX ORDER — ISOPROPYL ALCOHOL 70 ML/100ML
SWAB TOPICAL
Qty: 200 EACH | Refills: 5 | Status: SHIPPED | OUTPATIENT
Start: 2023-11-16

## 2023-11-30 NOTE — TELEPHONE ENCOUNTER
"  Assessment & Plan     Plantar fasciitis of left foot  Discussed the importance of limiting triggering activity.  Likely due to overuse.  Work note given for the next week or limited walking/standing and mainly light duty.  If not improving over the next week, recommended follow-up.  Discussed your recurrent episodes.  Encouraged him to wear a heel cup.  Can continue NSAIDs as needed.  Could consider podiatry follow-up if resistant to conservative treatment.             BMI:   Estimated body mass index is 28.63 kg/m  as calculated from the following:    Height as of this encounter: 1.88 m (6' 2\").    Weight as of this encounter: 101.2 kg (223 lb).           Ama Chacko PA-C  [facility] WILLIE Martin is a 28 year old, presenting for the following health issues:  Foot Injury      11/30/2023     1:16 PM   Additional Questions   Roomed by Ryanne   Accompanied by Self       Foot Injury    History of Present Illness       Reason for visit:  Heel pain  Symptom onset:  3-7 days ago  Symptoms include:  Heel pain  Symptom intensity:  Severe  Symptom progression:  Staying the same  Had these symptoms before:  No  What makes it worse:  Walking  What makes it better:  Taking pressure off of my heel    He eats 2-3 servings of fruits and vegetables daily.He consumes 0 sweetened beverage(s) daily.He exercises with enough effort to increase his heart rate 10 to 19 minutes per day.  He exercises with enough effort to increase his heart rate 3 or less days per week.   He is taking medications regularly.       Woke up with left heel pain 1 week ago.  Continue to walk on it has been worsening.  Feels worse in the mornings.  Not waking him up at night but does sleep sometimes.  Some tingling sensation to the affected foot.  Does not notice it on the right.  Taking ibuprofen without much relief.  Has tried a variety of over-the-counter shoe inserts without relief.  No similar issues in the " Pt called and states he has congestion, HA, fatigue and some cough. Patient states he has been using OTC cold medication.  Pt requests JOSE ortiz to LESLY Rolle "past.  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  Typically does 20,000 steps per shift.  Last got new shoes about 3 months ago, no issues previously.    Review of Systems         Objective    BP (!) 144/84 (BP Location: Left arm, Patient Position: Chair, Cuff Size: Adult Large)   Pulse 87   Temp 98  F (36.7  C) (Oral)   Resp 20   Ht 1.88 m (6' 2\")   Wt 101.2 kg (223 lb)   SpO2 99%   BMI 28.63 kg/m    Body mass index is 28.63 kg/m .  Physical Exam   GENERAL: healthy, alert and no distress  Left foot: No acute deformity.  Skin warm, dry.  Distal sensation intact.  PT and DP pulses 2+.  Tenderness to plantar fascia insertion.  Pain with dorsal and plantarflexion.  No bony tenderness.  Right foot used as comparison unremarkable.                      "

## 2023-12-05 ENCOUNTER — OFFICE VISIT (OUTPATIENT)
Dept: FAMILY MEDICINE CLINIC | Age: 53
End: 2023-12-05
Payer: MEDICARE

## 2023-12-05 VITALS
DIASTOLIC BLOOD PRESSURE: 80 MMHG | WEIGHT: 190 LBS | HEIGHT: 69 IN | HEART RATE: 98 BPM | SYSTOLIC BLOOD PRESSURE: 130 MMHG | BODY MASS INDEX: 28.14 KG/M2

## 2023-12-05 DIAGNOSIS — H65.02 NON-RECURRENT ACUTE SEROUS OTITIS MEDIA OF LEFT EAR: Primary | ICD-10-CM

## 2023-12-05 PROCEDURE — 3075F SYST BP GE 130 - 139MM HG: CPT | Performed by: INTERNAL MEDICINE

## 2023-12-05 PROCEDURE — 99213 OFFICE O/P EST LOW 20 MIN: CPT | Performed by: INTERNAL MEDICINE

## 2023-12-05 PROCEDURE — 96372 THER/PROPH/DIAG INJ SC/IM: CPT | Performed by: INTERNAL MEDICINE

## 2023-12-05 PROCEDURE — 3079F DIAST BP 80-89 MM HG: CPT | Performed by: INTERNAL MEDICINE

## 2023-12-05 RX ORDER — CEPHALEXIN 500 MG/1
500 CAPSULE ORAL 3 TIMES DAILY
Qty: 15 CAPSULE | Refills: 0 | Status: SHIPPED | OUTPATIENT
Start: 2023-12-05

## 2023-12-05 RX ORDER — CEFTRIAXONE 1 G/1
1000 INJECTION, POWDER, FOR SOLUTION INTRAMUSCULAR; INTRAVENOUS ONCE
Status: COMPLETED | OUTPATIENT
Start: 2023-12-05 | End: 2023-12-05

## 2023-12-05 RX ADMIN — CEFTRIAXONE 1000 MG: 1 INJECTION, POWDER, FOR SOLUTION INTRAMUSCULAR; INTRAVENOUS at 11:08

## 2023-12-05 ASSESSMENT — ENCOUNTER SYMPTOMS
RESPIRATORY NEGATIVE: 1
SORE THROAT: 1
BLOOD IN STOOL: 0
DIARRHEA: 0
NAUSEA: 0
CONSTIPATION: 0
ABDOMINAL PAIN: 0

## 2023-12-05 NOTE — PROGRESS NOTES
Brian Isidro (:  1970) is a 47 y.o. male,Established patient, here for evaluation of the following chief complaint(s):  Otalgia (Left side ear pain. ) and Pharyngitis (Sore throat, nasal congestion. )         ASSESSMENT/PLAN:  1. Non-recurrent acute serous otitis media of left ear  -     cefTRIAXone (ROCEPHIN) injection 1,000 mg; 1,000 mg, IntraMUSCular, ONCE, 1 dose, On 23 at 1130Antimicrobial Indications: OtherOther Abx Indication: Otitis mediaReconstitute with 3.6 mL 1% lidocaine or sterile water. -     cephALEXin (KEFLEX) 500 MG capsule; Take 1 capsule by mouth 3 times daily, Disp-15 capsule, R-0Normal      Plan:  1. Non-recurrent acute serous otitis media of left ear  Preston prefers an injection today. Will give Rocephin 1 g IM today. Keflex 500 mg three times a day x 5 days. - cefTRIAXone (ROCEPHIN) injection 1,000 mg    Jonah Bartholomew is instructed to return to the clinic if the symptoms continue or worsen. Jonah Bartholomew  was also instructed to go to the emergency room department if the symptoms significantly worsen before an appointment can be made. Subjective   SUBJECTIVE/OBJECTIVE:  Otalgia   There is pain in the left ear. This is a new problem. Episode onset: 4 days. The problem occurs constantly. There has been no fever. Associated symptoms include a sore throat. Pertinent negatives include no abdominal pain, diarrhea or neck pain. Associated symptoms comments: Nasal congestion  . Treatments tried: Flonase. The treatment provided mild relief. Pharyngitis  Associated symptoms include congestion and a sore throat. Pertinent negatives include no abdominal pain, arthralgias, chest pain, joint swelling, myalgias, nausea or neck pain. Review of Systems   Constitutional: Negative. HENT:  Positive for congestion, ear pain and sore throat. Negative for postnasal drip and sneezing. Eyes:  Negative for visual disturbance. Respiratory: Negative.      Cardiovascular:  Negative

## 2023-12-05 NOTE — PATIENT INSTRUCTIONS
Plan:  1. Non-recurrent acute serous otitis media of left ear  Preston prefers an injection today. Will give Rocephin 1 g IM today. Keflex 500 mg three times a day x 5 days. - cefTRIAXone (ROCEPHIN) injection 1,000 mg    Tim Tsai is instructed to return to the clinic if the symptoms continue or worsen. Tim Tsai  was also instructed to go to the emergency room department if the symptoms significantly worsen before an appointment can be made.

## 2023-12-11 ENCOUNTER — TELEPHONE (OUTPATIENT)
Dept: FAMILY MEDICINE CLINIC | Age: 53
End: 2023-12-11

## 2023-12-11 DIAGNOSIS — H66.009 NON-RECURRENT ACUTE SUPPURATIVE OTITIS MEDIA WITHOUT SPONTANEOUS RUPTURE OF TYMPANIC MEMBRANE, UNSPECIFIED LATERALITY: ICD-10-CM

## 2023-12-11 DIAGNOSIS — H66.009 NON-RECURRENT ACUTE SUPPURATIVE OTITIS MEDIA WITHOUT SPONTANEOUS RUPTURE OF TYMPANIC MEMBRANE, UNSPECIFIED LATERALITY: Primary | ICD-10-CM

## 2023-12-11 RX ORDER — CEPHALEXIN 500 MG/1
500 CAPSULE ORAL 3 TIMES DAILY
Qty: 21 CAPSULE | Refills: 0 | Status: SHIPPED | OUTPATIENT
Start: 2023-12-11

## 2023-12-11 RX ORDER — CEPHALEXIN 500 MG/1
500 CAPSULE ORAL 3 TIMES DAILY
Qty: 21 CAPSULE | Refills: 0 | Status: SHIPPED | OUTPATIENT
Start: 2023-12-11 | End: 2023-12-11 | Stop reason: SDUPTHER

## 2023-12-11 NOTE — TELEPHONE ENCOUNTER
Pt states even after the antibiotic his ear is still full and kind of painful. He is asking if there is anything else you can send in for him?         AMOS Hutchinson

## 2024-01-16 DIAGNOSIS — Z79.4 TYPE 2 DIABETES MELLITUS WITH DIABETIC POLYNEUROPATHY, WITH LONG-TERM CURRENT USE OF INSULIN (HCC): ICD-10-CM

## 2024-01-16 DIAGNOSIS — F43.23 SITUATIONAL MIXED ANXIETY AND DEPRESSIVE DISORDER: ICD-10-CM

## 2024-01-16 DIAGNOSIS — E78.2 MIXED HYPERLIPIDEMIA: ICD-10-CM

## 2024-01-16 DIAGNOSIS — E11.42 TYPE 2 DIABETES MELLITUS WITH DIABETIC POLYNEUROPATHY, WITH LONG-TERM CURRENT USE OF INSULIN (HCC): ICD-10-CM

## 2024-01-16 DIAGNOSIS — K21.9 GASTROESOPHAGEAL REFLUX DISEASE WITHOUT ESOPHAGITIS: ICD-10-CM

## 2024-01-16 DIAGNOSIS — I10 BENIGN ESSENTIAL HTN: ICD-10-CM

## 2024-01-16 DIAGNOSIS — F41.1 GENERALIZED ANXIETY DISORDER: ICD-10-CM

## 2024-01-17 RX ORDER — CYCLOBENZAPRINE HCL 10 MG
10 TABLET ORAL NIGHTLY PRN
Qty: 90 TABLET | Refills: 1 | Status: SHIPPED | OUTPATIENT
Start: 2024-01-17

## 2024-01-17 RX ORDER — CILOSTAZOL 100 MG/1
100 TABLET ORAL 2 TIMES DAILY
Qty: 180 TABLET | Refills: 1 | Status: SHIPPED | OUTPATIENT
Start: 2024-01-17

## 2024-01-17 RX ORDER — ROSUVASTATIN CALCIUM 10 MG/1
10 TABLET, COATED ORAL NIGHTLY
Qty: 90 TABLET | Refills: 1 | Status: SHIPPED | OUTPATIENT
Start: 2024-01-17

## 2024-01-17 RX ORDER — HYDROXYZINE HYDROCHLORIDE 25 MG/1
25 TABLET, FILM COATED ORAL 3 TIMES DAILY PRN
Qty: 540 TABLET | Refills: 1 | Status: SHIPPED | OUTPATIENT
Start: 2024-01-17

## 2024-01-17 RX ORDER — AMLODIPINE BESYLATE 5 MG/1
5 TABLET ORAL DAILY
Qty: 90 TABLET | Refills: 1 | Status: SHIPPED | OUTPATIENT
Start: 2024-01-17

## 2024-01-17 RX ORDER — LOSARTAN POTASSIUM 100 MG/1
100 TABLET ORAL DAILY
Qty: 90 TABLET | Refills: 1 | Status: SHIPPED | OUTPATIENT
Start: 2024-01-17

## 2024-01-17 RX ORDER — PANTOPRAZOLE SODIUM 40 MG/1
80 TABLET, DELAYED RELEASE ORAL DAILY
Qty: 180 TABLET | Refills: 1 | Status: SHIPPED | OUTPATIENT
Start: 2024-01-17

## 2024-01-17 RX ORDER — VENLAFAXINE HYDROCHLORIDE 75 MG/1
75 CAPSULE, EXTENDED RELEASE ORAL DAILY
Qty: 90 CAPSULE | Refills: 1 | Status: SHIPPED | OUTPATIENT
Start: 2024-01-17

## 2024-01-17 RX ORDER — METOPROLOL TARTRATE 50 MG/1
50 TABLET, FILM COATED ORAL 2 TIMES DAILY
Qty: 180 TABLET | Refills: 1 | Status: SHIPPED | OUTPATIENT
Start: 2024-01-17

## 2024-01-18 DIAGNOSIS — G89.29 CHRONIC MIDLINE LOW BACK PAIN WITHOUT SCIATICA: ICD-10-CM

## 2024-01-18 DIAGNOSIS — M54.50 CHRONIC MIDLINE LOW BACK PAIN WITHOUT SCIATICA: ICD-10-CM

## 2024-01-18 DIAGNOSIS — E11.42 DIABETIC PERIPHERAL NEUROPATHY (HCC): ICD-10-CM

## 2024-01-19 RX ORDER — GABAPENTIN 300 MG/1
CAPSULE ORAL
Qty: 90 CAPSULE | Refills: 2 | Status: SHIPPED | OUTPATIENT
Start: 2024-01-19 | End: 2024-04-18

## 2024-02-05 ENCOUNTER — OFFICE VISIT (OUTPATIENT)
Dept: FAMILY MEDICINE CLINIC | Age: 54
End: 2024-02-05
Payer: MEDICARE

## 2024-02-05 VITALS
BODY MASS INDEX: 28.88 KG/M2 | SYSTOLIC BLOOD PRESSURE: 120 MMHG | HEIGHT: 69 IN | WEIGHT: 195 LBS | HEART RATE: 98 BPM | DIASTOLIC BLOOD PRESSURE: 74 MMHG

## 2024-02-05 DIAGNOSIS — E78.2 MIXED HYPERLIPIDEMIA: ICD-10-CM

## 2024-02-05 DIAGNOSIS — I10 BENIGN ESSENTIAL HTN: ICD-10-CM

## 2024-02-05 DIAGNOSIS — E11.42 DIABETIC PERIPHERAL NEUROPATHY (HCC): ICD-10-CM

## 2024-02-05 DIAGNOSIS — F17.200 SMOKER: ICD-10-CM

## 2024-02-05 DIAGNOSIS — I73.9 PVD (PERIPHERAL VASCULAR DISEASE) (HCC): ICD-10-CM

## 2024-02-05 DIAGNOSIS — E11.00 DM HYPEROSMOLARITY TYPE II, UNCONTROLLED (HCC): ICD-10-CM

## 2024-02-05 DIAGNOSIS — K58.0 IRRITABLE BOWEL SYNDROME WITH DIARRHEA: ICD-10-CM

## 2024-02-05 DIAGNOSIS — K21.9 GASTROESOPHAGEAL REFLUX DISEASE WITHOUT ESOPHAGITIS: ICD-10-CM

## 2024-02-05 DIAGNOSIS — E55.9 VITAMIN D DEFICIENCY: ICD-10-CM

## 2024-02-05 DIAGNOSIS — E11.65 DM HYPEROSMOLARITY TYPE II, UNCONTROLLED (HCC): ICD-10-CM

## 2024-02-05 DIAGNOSIS — Z00.00 WELLNESS EXAMINATION: Primary | ICD-10-CM

## 2024-02-05 PROCEDURE — 99214 OFFICE O/P EST MOD 30 MIN: CPT | Performed by: INTERNAL MEDICINE

## 2024-02-05 PROCEDURE — 3078F DIAST BP <80 MM HG: CPT | Performed by: INTERNAL MEDICINE

## 2024-02-05 PROCEDURE — 3074F SYST BP LT 130 MM HG: CPT | Performed by: INTERNAL MEDICINE

## 2024-02-05 ASSESSMENT — ENCOUNTER SYMPTOMS
ABDOMINAL PAIN: 0
SHORTNESS OF BREATH: 0
NAUSEA: 0
BLOOD IN STOOL: 0
RESPIRATORY NEGATIVE: 1
SORE THROAT: 0
DIARRHEA: 1
CONSTIPATION: 0

## 2024-02-05 ASSESSMENT — PATIENT HEALTH QUESTIONNAIRE - PHQ9
8. MOVING OR SPEAKING SO SLOWLY THAT OTHER PEOPLE COULD HAVE NOTICED. OR THE OPPOSITE, BEING SO FIGETY OR RESTLESS THAT YOU HAVE BEEN MOVING AROUND A LOT MORE THAN USUAL: 0
SUM OF ALL RESPONSES TO PHQ QUESTIONS 1-9: 0
SUM OF ALL RESPONSES TO PHQ QUESTIONS 1-9: 0
2. FEELING DOWN, DEPRESSED OR HOPELESS: 0
1. LITTLE INTEREST OR PLEASURE IN DOING THINGS: 0
9. THOUGHTS THAT YOU WOULD BE BETTER OFF DEAD, OR OF HURTING YOURSELF: 0
3. TROUBLE FALLING OR STAYING ASLEEP: 0
SUM OF ALL RESPONSES TO PHQ QUESTIONS 1-9: 0
5. POOR APPETITE OR OVEREATING: 0
7. TROUBLE CONCENTRATING ON THINGS, SUCH AS READING THE NEWSPAPER OR WATCHING TELEVISION: 0
4. FEELING TIRED OR HAVING LITTLE ENERGY: 0
6. FEELING BAD ABOUT YOURSELF - OR THAT YOU ARE A FAILURE OR HAVE LET YOURSELF OR YOUR FAMILY DOWN: 0
SUM OF ALL RESPONSES TO PHQ9 QUESTIONS 1 & 2: 0
SUM OF ALL RESPONSES TO PHQ QUESTIONS 1-9: 0

## 2024-02-05 NOTE — PROGRESS NOTES
TABLET BY MOUTH TWICE DAILY, Disp: 180 tablet, Rfl: 1  pantoprazole (PROTONIX) 40 MG tablet, TAKE 2 TABLETS BY MOUTH DAILY, Disp: 180 tablet, Rfl: 1  losartan (COZAAR) 100 MG tablet, TAKE 1 TABLET BY MOUTH DAILY, Disp: 90 tablet, Rfl: 1  cyclobenzaprine (FLEXERIL) 10 MG tablet, TAKE 1 TABLET BY MOUTH NIGHTLY AS NEEDED FOR MUSCLE SPASMS, Disp: 90 tablet, Rfl: 1  hydrOXYzine HCl (ATARAX) 25 MG tablet, TAKE 1 TABLET BY MOUTH THREE TIMES DAILY AS NEEDED FOR ANXIETY, Disp: 540 tablet, Rfl: 1  insulin detemir (LEVEMIR FLEXTOUCH) 100 UNIT/ML injection pen, Inject 60 Units into the skin nightly, Disp: 15 Adjustable Dose Pre-filled Pen Syringe, Rfl: 5  insulin lispro, 1 Unit Dial, (HUMALOG KWIKPEN) 100 UNIT/ML SOPN, Inject 10 Units into the skin 3 times daily (before meals), Disp: 1 Adjustable Dose Pre-filled Pen Syringe, Rfl: 0  Blood Glucose Monitoring Suppl (BLOOD GLUCOSE MONITOR SYSTEM) w/Device KIT, Please dispense blood glucose monitor One Touch Verio. Patient tests three times daily. Dx E11.9, Z79.4, Disp: 1 kit, Rfl: 0  Cholecalciferol (VITAMIN D) 50 MCG (2000 UT) CAPS capsule, Take by mouth, Disp: , Rfl:   magnesium oxide (MAG-OX) 400 (241.3 Mg) MG TABS tablet, TAKE 1 TABLET BY MOUTH DAILY, Disp: , Rfl:   loperamide (IMODIUM) 2 MG capsule, Take 1 capsule by mouth 4 times daily as needed for Diarrhea, Disp: , Rfl:   aspirin 81 MG EC tablet, Take 1 tablet by mouth daily, Disp: , Rfl:   Alcohol Swabs (EASY TOUCH ALCOHOL PREP MEDIUM) 70 % PADS, USE TO TEST BLOOD SUGAR 3 TIMES DAILY, Disp: 200 each, Rfl: 5  Insulin Pen Needle 31G X 5 MM MISC, 1 each by Does not apply route daily Patient injects once daily, Disp: 100 each, Rfl: 1  Lancets (ONETOUCH DELICA PLUS [id number]) MISC, USE TO TEST BLOOD SUGAR 3 TIMES DAILY, Disp: 100 each, Rfl: 3  blood glucose test strips (ONETOUCH VERIO) strip, USE TO TEST BLOOD SUGAR 3 TIMES DAILY, Dx: E11.9, Disp: 100 strip, Rfl: 3  Insulin Syringe-Needle U-100 30G X 1/2\" 0.5 ML MISC, Dx

## 2024-02-05 NOTE — PATIENT INSTRUCTIONS
Survey:     You may be receiving a survey from [facility] regarding your visit today.     You may get this in the mail, through your MyChart or in your email.      Please complete the survey to enable us to provide the highest quality of care to you and your family. Please also, mention our names.     If you cannot score us as very good (5 Stars) on any question, please feel free to call the office to discuss how we could have made your experience exceptional.      Thank You!        Dr. Mathias, MD Loyd, TONG Mar, WILIAN Louie CNP, HAO Kilpatrick MA       Plan:  1. Diabetic peripheral neuropathy (HCC)  Has been stable on Gabapentin.  No change in medication.    2. PVD (peripheral vascular disease) (HCC)  Follows with vascular surgery yearly.     3. Benign essential HTN  Controlled on Losartan, Amlodipine, and Lopressor.  No change in medication.    4. Gastroesophageal reflux disease without esophagitis  Controlled on Protonix.  No change in medication. Follows with Gastroenterology.      5. Mixed hyperlipidemia  Controlled on Crestor.  No change in medication.    6. Vitamin D deficiency  Controlled on Vitamin D replacement.    7. Irritable bowel syndrome with diarrhea  Discussed stopping Metformin x 1 week to see if this improves.  If not, consider a 2 week trial with Xifaxan.      8. DM hyperosmolarity type II, uncontrolled (HCC)  Follows with Endocrinology who is adjusting his weight.     9. Smoker  Continue to cut back or stop smoking.    10. Wellness examination  See above concerns and discussion.  Continue with daily exercise program. Work on wt loss.       Preston was instructed to follow up in the clinic in 6 months for check up or as needed with any medical issues.

## 2024-02-22 ENCOUNTER — TELEPHONE (OUTPATIENT)
Dept: FAMILY MEDICINE CLINIC | Age: 54
End: 2024-02-22

## 2024-02-22 DIAGNOSIS — K58.0 IRRITABLE BOWEL SYNDROME WITH DIARRHEA: Primary | ICD-10-CM

## 2024-02-22 NOTE — TELEPHONE ENCOUNTER
----- Message from Luis Manuel Mari sent at 2/22/2024 11:25 AM EST -----  Subject: Message to Provider    QUESTIONS  Information for Provider? pt stated that him and pcp discussed getting put   on a medication for bowel syndrome and pt is now requesting to go on that   medication and have it be sent to drug [email or web address] in [place]. please give pt a   call with any questions or concerns.  ---------------------------------------------------------------------------  --------------  CALL BACK INFO  [phone]; OK to leave message on voicemail  ---------------------------------------------------------------------------  --------------  SCRIPT ANSWERS  Relationship to Patient? Self

## 2024-03-20 LAB — DIABETIC RETINOPATHY: NEGATIVE

## 2024-04-08 ENCOUNTER — OFFICE VISIT (OUTPATIENT)
Dept: FAMILY MEDICINE CLINIC | Age: 54
End: 2024-04-08
Payer: MEDICARE

## 2024-04-08 VITALS
WEIGHT: 190 LBS | HEIGHT: 69 IN | DIASTOLIC BLOOD PRESSURE: 86 MMHG | SYSTOLIC BLOOD PRESSURE: 128 MMHG | HEART RATE: 88 BPM | BODY MASS INDEX: 28.14 KG/M2

## 2024-04-08 DIAGNOSIS — K04.7 DENTAL ABSCESS: Primary | ICD-10-CM

## 2024-04-08 PROCEDURE — 3074F SYST BP LT 130 MM HG: CPT | Performed by: INTERNAL MEDICINE

## 2024-04-08 PROCEDURE — 99213 OFFICE O/P EST LOW 20 MIN: CPT | Performed by: INTERNAL MEDICINE

## 2024-04-08 PROCEDURE — 3079F DIAST BP 80-89 MM HG: CPT | Performed by: INTERNAL MEDICINE

## 2024-04-08 RX ORDER — AMOXICILLIN 500 MG/1
500 CAPSULE ORAL 3 TIMES DAILY
Qty: 30 CAPSULE | Refills: 0 | Status: SHIPPED | OUTPATIENT
Start: 2024-04-08 | End: 2024-04-18

## 2024-04-08 ASSESSMENT — ENCOUNTER SYMPTOMS
NAUSEA: 0
ABDOMINAL PAIN: 0
BLOOD IN STOOL: 0
DIARRHEA: 0
RESPIRATORY NEGATIVE: 1
SORE THROAT: 0
CONSTIPATION: 0

## 2024-04-08 NOTE — PATIENT INSTRUCTIONS
Survey:     You may be receiving a survey from [facility] regarding your visit today.     You may get this in the mail, through your MyChart or in your email.      Please complete the survey to enable us to provide the highest quality of care to you and your family. Please also, mention our names.     If you cannot score us as very good (5 Stars) on any question, please feel free to call the office to discuss how we could have made your experience exceptional.      Thank You!        Dr. Mathias, MD Loyd, TONG Mar, WILIAN Louie CNP, HAO Kilpatrick MA       Plan:  1. Dental abscess  Take Amoxicillin 500 mg three times a day x 10 days.  Follow up with dentistry.    - amoxicillin (AMOXIL) 500 MG capsule; Take 1 capsule by mouth 3 times daily for 10 days  Dispense: 30 capsule; Refill: 0

## 2024-04-08 NOTE — PROGRESS NOTES
Preston Yen (:  1970) is a 53 y.o. male,Established patient, here for evaluation of the following chief complaint(s):  Dental Pain (Tooth pulled 1 month ago with complications. C/o mouth pain, questions infection.  New dentist, appt scheduled 24. ) and Irritable Bowel Syndrome (Diarrhea improved since off the metformin past 2 months. Follows with Endocrinology in May. )         ASSESSMENT/PLAN:  1. Dental abscess  -     amoxicillin (AMOXIL) 500 MG capsule; Take 1 capsule by mouth 3 times daily for 10 days, Disp-30 capsule, R-0Normal      Plan:  1. Dental abscess  Take Amoxicillin 500 mg three times a day x 10 days.  Follow up with dentistry.    - amoxicillin (AMOXIL) 500 MG capsule; Take 1 capsule by mouth 3 times daily for 10 days  Dispense: 30 capsule; Refill: 0                 Subjective   SUBJECTIVE/OBJECTIVE:  Preston states he had a tooth pulled on the right upper jaw.  The states the dentist hit the other tooth and now his gum is swollen and painful.  He also is having pain with biting down on that side.  He has tasted some drainage.  No fever or chills.  His face was swollen but has been improving.  Preston was on PCN for 5 days which was 3 weeks ago.          Review of Systems   Constitutional: Negative.    HENT:  Positive for dental problem. Negative for congestion, ear pain, postnasal drip, sneezing and sore throat.    Eyes:  Negative for visual disturbance.   Respiratory: Negative.     Cardiovascular:  Negative for chest pain, palpitations and leg swelling.   Gastrointestinal:  Negative for abdominal pain, blood in stool, constipation, diarrhea and nausea.   Genitourinary:  Negative for difficulty urinating, dysuria, frequency and urgency.   Musculoskeletal:  Negative for arthralgias, joint swelling, myalgias, neck pain and neck stiffness.   Skin: Negative.    Neurological:  Negative for syncope.   Psychiatric/Behavioral: Negative.            Objective   Physical Exam  Vitals and nursing

## 2024-05-14 DIAGNOSIS — G89.29 CHRONIC MIDLINE LOW BACK PAIN WITHOUT SCIATICA: ICD-10-CM

## 2024-05-14 DIAGNOSIS — E11.42 DIABETIC PERIPHERAL NEUROPATHY (HCC): ICD-10-CM

## 2024-05-14 DIAGNOSIS — M54.50 CHRONIC MIDLINE LOW BACK PAIN WITHOUT SCIATICA: ICD-10-CM

## 2024-05-15 RX ORDER — GABAPENTIN 300 MG/1
300 CAPSULE ORAL 3 TIMES DAILY
Qty: 90 CAPSULE | Refills: 2 | Status: SHIPPED | OUTPATIENT
Start: 2024-05-15 | End: 2024-08-13

## 2024-07-15 DIAGNOSIS — K21.9 GASTROESOPHAGEAL REFLUX DISEASE WITHOUT ESOPHAGITIS: ICD-10-CM

## 2024-07-15 DIAGNOSIS — Z79.4 TYPE 2 DIABETES MELLITUS WITH DIABETIC POLYNEUROPATHY, WITH LONG-TERM CURRENT USE OF INSULIN (HCC): ICD-10-CM

## 2024-07-15 DIAGNOSIS — F43.23 SITUATIONAL MIXED ANXIETY AND DEPRESSIVE DISORDER: ICD-10-CM

## 2024-07-15 DIAGNOSIS — I10 BENIGN ESSENTIAL HTN: ICD-10-CM

## 2024-07-15 DIAGNOSIS — E78.2 MIXED HYPERLIPIDEMIA: ICD-10-CM

## 2024-07-15 DIAGNOSIS — E11.42 TYPE 2 DIABETES MELLITUS WITH DIABETIC POLYNEUROPATHY, WITH LONG-TERM CURRENT USE OF INSULIN (HCC): ICD-10-CM

## 2024-07-16 RX ORDER — LOSARTAN POTASSIUM 100 MG/1
100 TABLET ORAL DAILY
Qty: 30 TABLET | Refills: 10 | Status: SHIPPED | OUTPATIENT
Start: 2024-07-16

## 2024-07-16 RX ORDER — METOPROLOL TARTRATE 50 MG/1
50 TABLET, FILM COATED ORAL 2 TIMES DAILY
Qty: 60 TABLET | Refills: 10 | Status: SHIPPED | OUTPATIENT
Start: 2024-07-16

## 2024-07-16 RX ORDER — AMLODIPINE BESYLATE 5 MG/1
5 TABLET ORAL DAILY
Qty: 30 TABLET | Refills: 10 | Status: SHIPPED | OUTPATIENT
Start: 2024-07-16

## 2024-07-16 RX ORDER — PANTOPRAZOLE SODIUM 40 MG/1
TABLET, DELAYED RELEASE ORAL
Qty: 60 TABLET | Refills: 10 | Status: SHIPPED | OUTPATIENT
Start: 2024-07-16

## 2024-07-16 RX ORDER — VENLAFAXINE HYDROCHLORIDE 75 MG/1
CAPSULE, EXTENDED RELEASE ORAL DAILY
Qty: 30 CAPSULE | Refills: 10 | Status: SHIPPED | OUTPATIENT
Start: 2024-07-16

## 2024-07-16 RX ORDER — CILOSTAZOL 100 MG/1
100 TABLET ORAL 2 TIMES DAILY
Qty: 60 TABLET | Refills: 10 | Status: SHIPPED | OUTPATIENT
Start: 2024-07-16

## 2024-07-16 RX ORDER — ROSUVASTATIN CALCIUM 10 MG/1
10 TABLET, COATED ORAL NIGHTLY
Qty: 30 TABLET | Refills: 10 | Status: SHIPPED | OUTPATIENT
Start: 2024-07-16

## 2024-07-16 RX ORDER — CYCLOBENZAPRINE HCL 10 MG
10 TABLET ORAL NIGHTLY PRN
Qty: 30 TABLET | Refills: 10 | Status: SHIPPED | OUTPATIENT
Start: 2024-07-16

## 2024-08-13 DIAGNOSIS — M54.50 CHRONIC MIDLINE LOW BACK PAIN WITHOUT SCIATICA: ICD-10-CM

## 2024-08-13 DIAGNOSIS — G89.29 CHRONIC MIDLINE LOW BACK PAIN WITHOUT SCIATICA: ICD-10-CM

## 2024-08-13 DIAGNOSIS — E11.42 DIABETIC PERIPHERAL NEUROPATHY (HCC): ICD-10-CM

## 2024-08-14 RX ORDER — GABAPENTIN 300 MG/1
300 CAPSULE ORAL 3 TIMES DAILY
Qty: 90 CAPSULE | Refills: 1 | Status: SHIPPED | OUTPATIENT
Start: 2024-08-14 | End: 2025-02-10

## 2024-10-15 ENCOUNTER — TELEPHONE (OUTPATIENT)
Dept: FAMILY MEDICINE CLINIC | Age: 54
End: 2024-10-15

## 2024-10-15 DIAGNOSIS — E11.42 DIABETIC PERIPHERAL NEUROPATHY (HCC): ICD-10-CM

## 2024-10-15 DIAGNOSIS — G89.29 CHRONIC MIDLINE LOW BACK PAIN WITHOUT SCIATICA: ICD-10-CM

## 2024-10-15 DIAGNOSIS — M54.50 CHRONIC MIDLINE LOW BACK PAIN WITHOUT SCIATICA: ICD-10-CM

## 2024-10-15 RX ORDER — GABAPENTIN 300 MG/1
300 CAPSULE ORAL 3 TIMES DAILY
Qty: 90 CAPSULE | Refills: 3 | Status: SHIPPED | OUTPATIENT
Start: 2024-10-15 | End: 2025-02-12

## 2024-10-15 NOTE — TELEPHONE ENCOUNTER
Last OV: 4/8/2024  Last RX:    Next scheduled apt: Visit date not found    Pt due for appointment. LVM to schedule.

## 2025-01-14 DIAGNOSIS — F41.1 GENERALIZED ANXIETY DISORDER: ICD-10-CM

## 2025-01-16 RX ORDER — HYDROXYZINE HYDROCHLORIDE 25 MG/1
25 TABLET, FILM COATED ORAL 3 TIMES DAILY PRN
Qty: 90 TABLET | Refills: 0 | Status: SHIPPED | OUTPATIENT
Start: 2025-01-16

## 2025-02-11 DIAGNOSIS — E11.42 DIABETIC PERIPHERAL NEUROPATHY (HCC): ICD-10-CM

## 2025-02-11 DIAGNOSIS — G89.29 CHRONIC MIDLINE LOW BACK PAIN WITHOUT SCIATICA: ICD-10-CM

## 2025-02-11 DIAGNOSIS — M54.50 CHRONIC MIDLINE LOW BACK PAIN WITHOUT SCIATICA: ICD-10-CM

## 2025-02-11 DIAGNOSIS — F41.1 GENERALIZED ANXIETY DISORDER: ICD-10-CM

## 2025-02-12 RX ORDER — GABAPENTIN 300 MG/1
300 CAPSULE ORAL 3 TIMES DAILY
Qty: 90 CAPSULE | Refills: 0 | Status: SHIPPED | OUTPATIENT
Start: 2025-02-12 | End: 2025-05-13

## 2025-02-12 RX ORDER — HYDROXYZINE HYDROCHLORIDE 25 MG/1
25 TABLET, FILM COATED ORAL 3 TIMES DAILY PRN
Qty: 90 TABLET | Refills: 0 | Status: SHIPPED | OUTPATIENT
Start: 2025-02-12

## 2025-02-12 NOTE — TELEPHONE ENCOUNTER
ONLY GAVE 30 DAYS OF GABAPENTIN AND 90 OF ATARAX--- PT NEEDS APPT!    Mailed letter to pt. And sent [employer] message

## 2025-03-14 DIAGNOSIS — G89.29 CHRONIC MIDLINE LOW BACK PAIN WITHOUT SCIATICA: ICD-10-CM

## 2025-03-14 DIAGNOSIS — M54.50 CHRONIC MIDLINE LOW BACK PAIN WITHOUT SCIATICA: ICD-10-CM

## 2025-03-14 DIAGNOSIS — F41.1 GENERALIZED ANXIETY DISORDER: ICD-10-CM

## 2025-03-14 DIAGNOSIS — E11.42 DIABETIC PERIPHERAL NEUROPATHY (HCC): ICD-10-CM

## 2025-03-17 RX ORDER — HYDROXYZINE HYDROCHLORIDE 25 MG/1
TABLET, FILM COATED ORAL
Qty: 90 TABLET | Refills: 10 | OUTPATIENT
Start: 2025-03-17

## 2025-03-17 RX ORDER — GABAPENTIN 300 MG/1
CAPSULE ORAL
Qty: 90 CAPSULE | Refills: 10 | OUTPATIENT
Start: 2025-03-17

## 2025-06-10 DIAGNOSIS — F43.23 SITUATIONAL MIXED ANXIETY AND DEPRESSIVE DISORDER: ICD-10-CM

## 2025-06-10 DIAGNOSIS — E78.2 MIXED HYPERLIPIDEMIA: ICD-10-CM

## 2025-06-10 DIAGNOSIS — I10 BENIGN ESSENTIAL HTN: ICD-10-CM

## 2025-06-10 DIAGNOSIS — K21.9 GASTROESOPHAGEAL REFLUX DISEASE WITHOUT ESOPHAGITIS: ICD-10-CM

## 2025-06-11 RX ORDER — ROSUVASTATIN CALCIUM 10 MG/1
10 TABLET, COATED ORAL NIGHTLY
Qty: 30 TABLET | Refills: 11 | OUTPATIENT
Start: 2025-06-11

## 2025-06-11 RX ORDER — CYCLOBENZAPRINE HCL 10 MG
10 TABLET ORAL NIGHTLY PRN
Qty: 30 TABLET | Refills: 11 | OUTPATIENT
Start: 2025-06-11

## 2025-06-11 RX ORDER — METOPROLOL TARTRATE 50 MG
50 TABLET ORAL 2 TIMES DAILY
Qty: 60 TABLET | Refills: 11 | OUTPATIENT
Start: 2025-06-11

## 2025-06-11 RX ORDER — VENLAFAXINE HYDROCHLORIDE 75 MG/1
CAPSULE, EXTENDED RELEASE ORAL DAILY
Qty: 30 CAPSULE | Refills: 11 | OUTPATIENT
Start: 2025-06-11

## 2025-06-11 RX ORDER — PANTOPRAZOLE SODIUM 40 MG/1
TABLET, DELAYED RELEASE ORAL
Qty: 60 TABLET | Refills: 11 | OUTPATIENT
Start: 2025-06-11

## 2025-06-11 RX ORDER — AMLODIPINE BESYLATE 5 MG/1
5 TABLET ORAL DAILY
Qty: 30 TABLET | Refills: 11 | OUTPATIENT
Start: 2025-06-11

## 2025-06-11 RX ORDER — LOSARTAN POTASSIUM 100 MG/1
100 TABLET ORAL DAILY
Qty: 30 TABLET | Refills: 11 | OUTPATIENT
Start: 2025-06-11

## 2025-06-11 RX ORDER — CILOSTAZOL 100 MG/1
100 TABLET ORAL 2 TIMES DAILY
Qty: 60 TABLET | Refills: 11 | OUTPATIENT
Start: 2025-06-11